# Patient Record
Sex: FEMALE | Race: WHITE | NOT HISPANIC OR LATINO
[De-identification: names, ages, dates, MRNs, and addresses within clinical notes are randomized per-mention and may not be internally consistent; named-entity substitution may affect disease eponyms.]

---

## 2019-04-11 DIAGNOSIS — Z82.3 FAMILY HISTORY OF STROKE: ICD-10-CM

## 2019-04-11 DIAGNOSIS — Z82.49 FAMILY HISTORY OF ISCHEMIC HEART DISEASE AND OTHER DISEASES OF THE CIRCULATORY SYSTEM: ICD-10-CM

## 2019-04-11 RX ORDER — ATORVASTATIN CALCIUM 10 MG/1
10 TABLET, FILM COATED ORAL
Qty: 30 | Refills: 0 | Status: COMPLETED | COMMUNITY

## 2019-04-12 ENCOUNTER — MEDICATION RENEWAL (OUTPATIENT)
Age: 54
End: 2019-04-12

## 2019-05-08 ENCOUNTER — LABORATORY RESULT (OUTPATIENT)
Age: 54
End: 2019-05-08

## 2019-05-08 ENCOUNTER — APPOINTMENT (OUTPATIENT)
Dept: CARDIOLOGY | Facility: CLINIC | Age: 54
End: 2019-05-08
Payer: COMMERCIAL

## 2019-05-08 VITALS
DIASTOLIC BLOOD PRESSURE: 82 MMHG | HEART RATE: 72 BPM | WEIGHT: 180 LBS | BODY MASS INDEX: 28.93 KG/M2 | HEIGHT: 66 IN | OXYGEN SATURATION: 97 % | SYSTOLIC BLOOD PRESSURE: 110 MMHG | TEMPERATURE: 98.9 F

## 2019-05-08 DIAGNOSIS — R10.9 UNSPECIFIED ABDOMINAL PAIN: ICD-10-CM

## 2019-05-08 DIAGNOSIS — Z87.891 PERSONAL HISTORY OF NICOTINE DEPENDENCE: ICD-10-CM

## 2019-05-08 PROCEDURE — 99204 OFFICE O/P NEW MOD 45 MIN: CPT

## 2019-05-08 PROCEDURE — 93306 TTE W/DOPPLER COMPLETE: CPT

## 2019-05-08 PROCEDURE — 93000 ELECTROCARDIOGRAM COMPLETE: CPT

## 2019-05-08 RX ORDER — DICLOFENAC SODIUM 100 MG/1
100 TABLET, FILM COATED, EXTENDED RELEASE ORAL
Qty: 7 | Refills: 0 | Status: DISCONTINUED | COMMUNITY
End: 2019-05-08

## 2019-05-08 RX ORDER — AMLODIPINE BESYLATE VALSARTAN HYDROCHLOROTHIAZIDE 10; 25; 320 MG/1; MG/1; MG/1
TABLET, FILM COATED ORAL DAILY
Refills: 0 | Status: DISCONTINUED | COMMUNITY
End: 2019-05-08

## 2019-05-10 DIAGNOSIS — R79.89 OTHER SPECIFIED ABNORMAL FINDINGS OF BLOOD CHEMISTRY: ICD-10-CM

## 2019-05-10 LAB
25(OH)D3 SERPL-MCNC: 46.2 NG/ML
ALBUMIN SERPL ELPH-MCNC: 4.6 G/DL
ALP BLD-CCNC: 80 U/L
ALT SERPL-CCNC: 25 U/L
ANION GAP SERPL CALC-SCNC: 12 MMOL/L
AST SERPL-CCNC: 19 U/L
BASOPHILS # BLD AUTO: 0.07 K/UL
BASOPHILS NFR BLD AUTO: 0.5 %
BILIRUB SERPL-MCNC: 0.5 MG/DL
BUN SERPL-MCNC: 18 MG/DL
CALCIUM SERPL-MCNC: 10.2 MG/DL
CHLORIDE SERPL-SCNC: 103 MMOL/L
CHOLEST SERPL-MCNC: 183 MG/DL
CHOLEST/HDLC SERPL: 2.7 RATIO
CO2 SERPL-SCNC: 27 MMOL/L
CREAT SERPL-MCNC: 0.74 MG/DL
EOSINOPHIL # BLD AUTO: 0.13 K/UL
EOSINOPHIL NFR BLD AUTO: 1 %
ESTIMATED AVERAGE GLUCOSE: 111 MG/DL
GLUCOSE SERPL-MCNC: 92 MG/DL
HBA1C MFR BLD HPLC: 5.5 %
HCT VFR BLD CALC: 46.8 %
HDLC SERPL-MCNC: 69 MG/DL
HGB BLD-MCNC: 15 G/DL
IMM GRANULOCYTES NFR BLD AUTO: 0.4 %
LDLC SERPL CALC-MCNC: 92 MG/DL
LYMPHOCYTES # BLD AUTO: 3.59 K/UL
LYMPHOCYTES NFR BLD AUTO: 26.5 %
MAGNESIUM SERPL-MCNC: 2.1 MG/DL
MAN DIFF?: NORMAL
MCHC RBC-ENTMCNC: 32.1 GM/DL
MCHC RBC-ENTMCNC: 33 PG
MCV RBC AUTO: 102.9 FL
MONOCYTES # BLD AUTO: 1 K/UL
MONOCYTES NFR BLD AUTO: 7.4 %
NEUTROPHILS # BLD AUTO: 8.73 K/UL
NEUTROPHILS NFR BLD AUTO: 64.2 %
PHOSPHATE SERPL-MCNC: 3.9 MG/DL
PLATELET # BLD AUTO: 476 K/UL
POTASSIUM SERPL-SCNC: 5.3 MMOL/L
PROT SERPL-MCNC: 7.5 G/DL
RBC # BLD: 4.55 M/UL
RBC # FLD: 12.8 %
SODIUM SERPL-SCNC: 142 MMOL/L
T3RU NFR SERPL: 1 TBI
T4 FREE SERPL-MCNC: 1.5 NG/DL
T4 SERPL-MCNC: 9.2 UG/DL
TRIGL SERPL-MCNC: 111 MG/DL
TSH SERPL-ACNC: 2.7 UIU/ML
URATE SERPL-MCNC: 3.1 MG/DL
WBC # FLD AUTO: 13.57 K/UL

## 2019-05-10 NOTE — PHYSICAL EXAM
[General Appearance - Well Developed] : well developed [Normal Appearance] : normal appearance [Well Groomed] : well groomed [General Appearance - Well Nourished] : well nourished [General Appearance - In No Acute Distress] : no acute distress [No Deformities] : no deformities [Eyelids - No Xanthelasma] : the eyelids demonstrated no xanthelasmas [Normal Conjunctiva] : the conjunctiva exhibited no abnormalities [No Oral Pallor] : no oral pallor [Normal Oral Mucosa] : normal oral mucosa [Normal Jugular Venous A Waves Present] : normal jugular venous A waves present [No Oral Cyanosis] : no oral cyanosis [No Jugular Venous Lemus A Waves] : no jugular venous lemus A waves [Normal Jugular Venous V Waves Present] : normal jugular venous V waves present [Murmurs] : no murmurs present [Heart Rate And Rhythm] : heart rate and rhythm were normal [Heart Sounds] : normal S1 and S2 [Exaggerated Use Of Accessory Muscles For Inspiration] : no accessory muscle use [Respiration, Rhythm And Depth] : normal respiratory rhythm and effort [Auscultation Breath Sounds / Voice Sounds] : lungs were clear to auscultation bilaterally [Abdomen Tenderness] : non-tender [Abdomen Soft] : soft [Abnormal Walk] : normal gait [Abdomen Mass (___ Cm)] : no abdominal mass palpated [Gait - Sufficient For Exercise Testing] : the gait was sufficient for exercise testing [Nail Clubbing] : no clubbing of the fingernails [Cyanosis, Localized] : no localized cyanosis [Petechial Hemorrhages (___cm)] : no petechial hemorrhages [] : no ischemic changes [Skin Color & Pigmentation] : normal skin color and pigmentation [No Venous Stasis] : no venous stasis [Skin Lesions] : no skin lesions [No Skin Ulcers] : no skin ulcer [No Xanthoma] : no  xanthoma was observed [Oriented To Time, Place, And Person] : oriented to person, place, and time [Affect] : the affect was normal [Mood] : the mood was normal [FreeTextEntry1] : admits to anxiety

## 2019-05-10 NOTE — REASON FOR VISIT
[FreeTextEntry1] : 54 year old female Latonia Guzman presents today for a cardiac evaluation and follow up. States that she feels like whenever she swallows she feels like something is stuck in her throat. he patient states that about a month agoshe had noticed that she may have had some blood in her stool. She states that this did resolve and shehasn't had any episodes since that time.Patient states that she doesn’t have any chest pain, SOB, or lightheadedness. But that she feels like she cannot take a full breath. States she does not know if this is from anxiety. Wants to get blood work and her medications refilled. The patient presents for evaluation of high blood pressure. Patient is currently tolerating the current  antihypertensive regime and they deny headaches, stiff neck, visual changes, pedal Edema or PND. They also are here for follow-up of elevated cholesterol. Patient is currently tolerating medication and denies muscle pain, joint pain, back pain, tea colored urine ,nausea, vomiting, abdominal pain or diarrhea. The patient is trying to follow a low cholesterol diet.\par

## 2019-05-30 ENCOUNTER — APPOINTMENT (OUTPATIENT)
Dept: GASTROENTEROLOGY | Facility: CLINIC | Age: 54
End: 2019-05-30
Payer: COMMERCIAL

## 2019-05-30 VITALS
SYSTOLIC BLOOD PRESSURE: 125 MMHG | HEIGHT: 66 IN | BODY MASS INDEX: 28.93 KG/M2 | TEMPERATURE: 98.6 F | OXYGEN SATURATION: 98 % | HEART RATE: 75 BPM | WEIGHT: 180 LBS | DIASTOLIC BLOOD PRESSURE: 80 MMHG

## 2019-05-30 DIAGNOSIS — K92.1 MELENA: ICD-10-CM

## 2019-05-30 PROCEDURE — 99244 OFF/OP CNSLTJ NEW/EST MOD 40: CPT

## 2019-05-30 NOTE — PHYSICAL EXAM
[General Appearance - Alert] : alert [General Appearance - In No Acute Distress] : in no acute distress [Sclera] : the sclera and conjunctiva were normal [PERRL With Normal Accommodation] : pupils were equal in size, round, and reactive to light [Extraocular Movements] : extraocular movements were intact [Outer Ear] : the ears and nose were normal in appearance [Oropharynx] : the oropharynx was normal [Neck Appearance] : the appearance of the neck was normal [Neck Cervical Mass (___cm)] : no neck mass was observed [Jugular Venous Distention Increased] : there was no jugular-venous distention [Thyroid Diffuse Enlargement] : the thyroid was not enlarged [Thyroid Nodule] : there were no palpable thyroid nodules [] : no respiratory distress [Auscultation Breath Sounds / Voice Sounds] : lungs were clear to auscultation bilaterally [Heart Rate And Rhythm] : heart rate was normal and rhythm regular [Heart Sounds] : normal S1 and S2 [Heart Sounds Gallop] : no gallops [Murmurs] : no murmurs [Heart Sounds Pericardial Friction Rub] : no pericardial rub [Normal] : normal [Soft, Nontender] : the abdomen was soft and nontender [Epigastric] : in the epigastric area [No Mass] : no masses were palpated [No HSM] : no hepatosplenomegaly noted [Cervical Lymph Nodes Enlarged Posterior Bilaterally] : posterior cervical [Cervical Lymph Nodes Enlarged Anterior Bilaterally] : anterior cervical [Supraclavicular Lymph Nodes Enlarged Bilaterally] : supraclavicular [Axillary Lymph Nodes Enlarged Bilaterally] : axillary [Femoral Lymph Nodes Enlarged Bilaterally] : femoral [Inguinal Lymph Nodes Enlarged Bilaterally] : inguinal [No CVA Tenderness] : no ~M costovertebral angle tenderness [No Spinal Tenderness] : no spinal tenderness [Abnormal Walk] : normal gait [Nail Clubbing] : no clubbing  or cyanosis of the fingernails [Musculoskeletal - Swelling] : no joint swelling seen [Motor Tone] : muscle strength and tone were normal [Deep Tendon Reflexes (DTR)] : deep tendon reflexes were 2+ and symmetric [Sensation] : the sensory exam was normal to light touch and pinprick [No Focal Deficits] : no focal deficits [Oriented To Time, Place, And Person] : oriented to person, place, and time [Impaired Insight] : insight and judgment were intact [Affect] : the affect was normal

## 2019-05-30 NOTE — HISTORY OF PRESENT ILLNESS
[de-identified] : Last colon 5 years ago \par \par GERD on NSAIDS \par \par For hemorrhoidctomy\par \par I discussed the risks benefits and alternatives of Hemorrhoid Electrical Treatment at length. we discussed the procedure and the recovery period. We also discussed that periodically additional / surgical hemorrhoid treatment may become necessary.\par \par  A low FODMAP diet was discussed with the patient at length. The patient had multiple questions all of which were answered. I recommended a nutritionist. Also recommended that the patient keep a food diary. We discussed  options such as Vegetables. Fresh fruits. Dairy that is lactose-free, and hard cheeses, or ripened/matured cheeses including... Beef, pork, chicken, fish, eggs. Avoid breadcrumbs, marinades, and sauces/gravies that may be high in FODMAPs. Soy products including tofu, tempeh. Grains.\par \par A low acid / reflux diet was discussed in great detail including  not smoking, not drinking alcohol, and not consuming foods that irritate the esophagus. It is helpful to eat small meals throughout the day instead of large meals. You should avoid eating before bedtime or lying down after you eat. It can be helpful to raise the head of your bed six inches. Additionally, you should maintain a healthy weight and good posture.. The patient was given written material to take home and review.\par

## 2019-07-09 ENCOUNTER — APPOINTMENT (OUTPATIENT)
Dept: GASTROENTEROLOGY | Facility: HOSPITAL | Age: 54
End: 2019-07-09

## 2019-07-23 ENCOUNTER — APPOINTMENT (OUTPATIENT)
Dept: CARDIOLOGY | Facility: CLINIC | Age: 54
End: 2019-07-23

## 2019-08-06 ENCOUNTER — RX RENEWAL (OUTPATIENT)
Age: 54
End: 2019-08-06

## 2019-09-10 ENCOUNTER — MEDICATION RENEWAL (OUTPATIENT)
Age: 54
End: 2019-09-10

## 2019-10-02 ENCOUNTER — APPOINTMENT (OUTPATIENT)
Dept: CARDIOLOGY | Facility: CLINIC | Age: 54
End: 2019-10-02
Payer: COMMERCIAL

## 2019-10-02 ENCOUNTER — NON-APPOINTMENT (OUTPATIENT)
Age: 54
End: 2019-10-02

## 2019-10-02 VITALS
OXYGEN SATURATION: 99 % | BODY MASS INDEX: 30.53 KG/M2 | WEIGHT: 190 LBS | DIASTOLIC BLOOD PRESSURE: 95 MMHG | HEART RATE: 92 BPM | SYSTOLIC BLOOD PRESSURE: 115 MMHG | TEMPERATURE: 98.6 F | HEIGHT: 66 IN

## 2019-10-02 PROCEDURE — 99214 OFFICE O/P EST MOD 30 MIN: CPT

## 2019-10-02 PROCEDURE — 93000 ELECTROCARDIOGRAM COMPLETE: CPT

## 2019-10-03 LAB
ALBUMIN SERPL ELPH-MCNC: 4.8 G/DL
ALP BLD-CCNC: 83 U/L
ALT SERPL-CCNC: 28 U/L
ANION GAP SERPL CALC-SCNC: 13 MMOL/L
AST SERPL-CCNC: 26 U/L
BILIRUB DIRECT SERPL-MCNC: 0.1 MG/DL
BILIRUB INDIRECT SERPL-MCNC: 0.4 MG/DL
BILIRUB SERPL-MCNC: 0.6 MG/DL
BUN SERPL-MCNC: 14 MG/DL
CALCIUM SERPL-MCNC: 10.1 MG/DL
CHLORIDE SERPL-SCNC: 103 MMOL/L
CHOLEST SERPL-MCNC: 183 MG/DL
CHOLEST/HDLC SERPL: 3.2 RATIO
CK SERPL-CCNC: 59 U/L
CO2 SERPL-SCNC: 25 MMOL/L
CREAT SERPL-MCNC: 0.71 MG/DL
ESTIMATED AVERAGE GLUCOSE: 114 MG/DL
GLUCOSE SERPL-MCNC: 90 MG/DL
HBA1C MFR BLD HPLC: 5.6 %
HDLC SERPL-MCNC: 58 MG/DL
LDLC SERPL CALC-MCNC: 99 MG/DL
LDLC SERPL DIRECT ASSAY-MCNC: 115 MG/DL
POTASSIUM SERPL-SCNC: 4.5 MMOL/L
PROT SERPL-MCNC: 7.1 G/DL
SODIUM SERPL-SCNC: 141 MMOL/L
TRIGL SERPL-MCNC: 130 MG/DL

## 2019-10-07 ENCOUNTER — OUTPATIENT (OUTPATIENT)
Dept: OUTPATIENT SERVICES | Facility: HOSPITAL | Age: 54
LOS: 1 days | End: 2019-10-07
Payer: COMMERCIAL

## 2019-10-07 VITALS
WEIGHT: 188.27 LBS | HEART RATE: 78 BPM | DIASTOLIC BLOOD PRESSURE: 87 MMHG | TEMPERATURE: 99 F | SYSTOLIC BLOOD PRESSURE: 128 MMHG | RESPIRATION RATE: 16 BRPM | HEIGHT: 65.5 IN | OXYGEN SATURATION: 98 %

## 2019-10-07 DIAGNOSIS — M20.5X1 OTHER DEFORMITIES OF TOE(S) (ACQUIRED), RIGHT FOOT: ICD-10-CM

## 2019-10-07 DIAGNOSIS — Z98.51 TUBAL LIGATION STATUS: Chronic | ICD-10-CM

## 2019-10-07 DIAGNOSIS — M20.41 OTHER HAMMER TOE(S) (ACQUIRED), RIGHT FOOT: ICD-10-CM

## 2019-10-07 DIAGNOSIS — Z98.82 BREAST IMPLANT STATUS: Chronic | ICD-10-CM

## 2019-10-07 DIAGNOSIS — Z01.818 ENCOUNTER FOR OTHER PREPROCEDURAL EXAMINATION: ICD-10-CM

## 2019-10-07 DIAGNOSIS — M24.574 CONTRACTURE, RIGHT FOOT: ICD-10-CM

## 2019-10-07 DIAGNOSIS — M20.21 HALLUX RIGIDUS, RIGHT FOOT: ICD-10-CM

## 2019-10-07 DIAGNOSIS — Z98.890 OTHER SPECIFIED POSTPROCEDURAL STATES: Chronic | ICD-10-CM

## 2019-10-07 LAB
HCT VFR BLD CALC: 42.2 % — SIGNIFICANT CHANGE UP (ref 34.5–45)
HGB BLD-MCNC: 14.2 G/DL — SIGNIFICANT CHANGE UP (ref 11.5–15.5)
MCHC RBC-ENTMCNC: 32.5 PG — SIGNIFICANT CHANGE UP (ref 27–34)
MCHC RBC-ENTMCNC: 33.6 GM/DL — SIGNIFICANT CHANGE UP (ref 32–36)
MCV RBC AUTO: 96.6 FL — SIGNIFICANT CHANGE UP (ref 80–100)
NRBC # BLD: 0 /100 WBCS — SIGNIFICANT CHANGE UP (ref 0–0)
PLATELET # BLD AUTO: 360 K/UL — SIGNIFICANT CHANGE UP (ref 150–400)
RBC # BLD: 4.37 M/UL — SIGNIFICANT CHANGE UP (ref 3.8–5.2)
RBC # FLD: 11.9 % — SIGNIFICANT CHANGE UP (ref 10.3–14.5)
WBC # BLD: 8.5 K/UL — SIGNIFICANT CHANGE UP (ref 3.8–10.5)
WBC # FLD AUTO: 8.5 K/UL — SIGNIFICANT CHANGE UP (ref 3.8–10.5)

## 2019-10-07 PROCEDURE — G0463: CPT

## 2019-10-07 PROCEDURE — 85027 COMPLETE CBC AUTOMATED: CPT

## 2019-10-07 PROCEDURE — 36415 COLL VENOUS BLD VENIPUNCTURE: CPT

## 2019-10-07 NOTE — H&P PST ADULT - ATTENDING COMMENTS
Physician Assistant Statement   I have reviewed the chart and labs.  There have been no changes in the patients medical or physical hx since her PST's and   medical clearance.    Rhoda faith
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
## 2019-10-07 NOTE — H&P PST ADULT - RS GEN PE MLT RESP DETAILS PC
no chest wall tenderness/good air movement/normal/breath sounds equal/respirations non-labored/no intercostal retractions/clear to auscultation bilaterally/airway patent

## 2019-10-07 NOTE — H&P PST ADULT - NSANTHOSAYNRD_GEN_A_CORE
No. SHARYN screening performed.  STOP BANG Legend: 0-2 = LOW Risk; 3-4 = INTERMEDIATE Risk; 5-8 = HIGH Risk

## 2019-10-07 NOTE — H&P PST ADULT - NSICDXPASTSURGICALHX_GEN_ALL_CORE_FT
PAST SURGICAL HISTORY:  H/O breast implant     H/O lumpectomy     H/O tubal ligation      S/P surgical removal of pilonidal cyst 1990

## 2019-10-07 NOTE — H&P PST ADULT - NSICDXPROBLEM_GEN_ALL_CORE_FT
PROBLEM DIAGNOSES  Problem: Other deformities of toe(s) (acquired), right foot  Assessment and Plan: Patient schedule for right foot surgery. Labs and medical clearance pending.

## 2019-10-17 ENCOUNTER — TRANSCRIPTION ENCOUNTER (OUTPATIENT)
Age: 54
End: 2019-10-17

## 2019-10-18 ENCOUNTER — RESULT REVIEW (OUTPATIENT)
Age: 54
End: 2019-10-18

## 2019-10-18 ENCOUNTER — OUTPATIENT (OUTPATIENT)
Dept: OUTPATIENT SERVICES | Facility: HOSPITAL | Age: 54
LOS: 1 days | End: 2019-10-18
Payer: COMMERCIAL

## 2019-10-18 VITALS
TEMPERATURE: 98 F | HEART RATE: 88 BPM | OXYGEN SATURATION: 96 % | WEIGHT: 188.27 LBS | SYSTOLIC BLOOD PRESSURE: 112 MMHG | HEIGHT: 65.5 IN | DIASTOLIC BLOOD PRESSURE: 79 MMHG | RESPIRATION RATE: 17 BRPM

## 2019-10-18 VITALS
RESPIRATION RATE: 16 BRPM | OXYGEN SATURATION: 98 % | HEART RATE: 70 BPM | SYSTOLIC BLOOD PRESSURE: 112 MMHG | DIASTOLIC BLOOD PRESSURE: 76 MMHG | TEMPERATURE: 98 F

## 2019-10-18 DIAGNOSIS — M20.21 HALLUX RIGIDUS, RIGHT FOOT: ICD-10-CM

## 2019-10-18 DIAGNOSIS — Z98.82 BREAST IMPLANT STATUS: Chronic | ICD-10-CM

## 2019-10-18 DIAGNOSIS — Z98.890 OTHER SPECIFIED POSTPROCEDURAL STATES: Chronic | ICD-10-CM

## 2019-10-18 DIAGNOSIS — M20.41 OTHER HAMMER TOE(S) (ACQUIRED), RIGHT FOOT: ICD-10-CM

## 2019-10-18 DIAGNOSIS — Z98.51 TUBAL LIGATION STATUS: Chronic | ICD-10-CM

## 2019-10-18 DIAGNOSIS — M24.574 CONTRACTURE, RIGHT FOOT: ICD-10-CM

## 2019-10-18 DIAGNOSIS — M20.5X1 OTHER DEFORMITIES OF TOE(S) (ACQUIRED), RIGHT FOOT: ICD-10-CM

## 2019-10-18 PROCEDURE — 88311 DECALCIFY TISSUE: CPT

## 2019-10-18 PROCEDURE — 28272 RELEASE OF TOE JOINT EACH: CPT | Mod: T9

## 2019-10-18 PROCEDURE — 88311 DECALCIFY TISSUE: CPT | Mod: 26

## 2019-10-18 PROCEDURE — 73620 X-RAY EXAM OF FOOT: CPT

## 2019-10-18 PROCEDURE — 88304 TISSUE EXAM BY PATHOLOGIST: CPT | Mod: 26

## 2019-10-18 PROCEDURE — 73620 X-RAY EXAM OF FOOT: CPT | Mod: 26,RT

## 2019-10-18 PROCEDURE — 28292 COR HLX VLGS RSC PRX PHLX BS: CPT | Mod: RT

## 2019-10-18 PROCEDURE — 28270 RELEASE OF FOOT CONTRACTURE: CPT | Mod: T6,XS

## 2019-10-18 PROCEDURE — 88304 TISSUE EXAM BY PATHOLOGIST: CPT

## 2019-10-18 RX ORDER — HYDROMORPHONE HYDROCHLORIDE 2 MG/ML
0.5 INJECTION INTRAMUSCULAR; INTRAVENOUS; SUBCUTANEOUS
Refills: 0 | Status: DISCONTINUED | OUTPATIENT
Start: 2019-10-18 | End: 2019-10-18

## 2019-10-18 RX ORDER — GABAPENTIN 400 MG/1
1 CAPSULE ORAL
Qty: 0 | Refills: 0 | DISCHARGE
Start: 2019-10-18

## 2019-10-18 RX ORDER — SODIUM CHLORIDE 9 MG/ML
1000 INJECTION, SOLUTION INTRAVENOUS
Refills: 0 | Status: DISCONTINUED | OUTPATIENT
Start: 2019-10-18 | End: 2019-10-18

## 2019-10-18 RX ORDER — DUPILUMAB 300 MG/2ML
1 INJECTION, SOLUTION SUBCUTANEOUS
Qty: 0 | Refills: 0 | DISCHARGE
Start: 2019-10-18

## 2019-10-18 RX ORDER — PANTOPRAZOLE SODIUM 20 MG/1
1 TABLET, DELAYED RELEASE ORAL
Qty: 0 | Refills: 0 | DISCHARGE
Start: 2019-10-18

## 2019-10-18 RX ORDER — GABAPENTIN 400 MG/1
1 CAPSULE ORAL
Qty: 0 | Refills: 0 | DISCHARGE

## 2019-10-18 RX ORDER — PANTOPRAZOLE SODIUM 20 MG/1
1 TABLET, DELAYED RELEASE ORAL
Qty: 0 | Refills: 0 | DISCHARGE

## 2019-10-18 RX ORDER — DUPILUMAB 300 MG/2ML
0 INJECTION, SOLUTION SUBCUTANEOUS
Qty: 0 | Refills: 0 | DISCHARGE

## 2019-10-18 RX ORDER — ONDANSETRON 8 MG/1
4 TABLET, FILM COATED ORAL ONCE
Refills: 0 | Status: DISCONTINUED | OUTPATIENT
Start: 2019-10-18 | End: 2019-10-18

## 2019-10-18 RX ORDER — LEVOTHYROXINE SODIUM 125 MCG
1 TABLET ORAL
Qty: 0 | Refills: 0 | DISCHARGE

## 2019-10-18 RX ORDER — AMLODIPINE AND VALSARTAN 5; 320 MG/1; MG/1
1 TABLET, FILM COATED ORAL
Qty: 0 | Refills: 0 | DISCHARGE

## 2019-10-18 RX ORDER — ACETAMINOPHEN 500 MG
2 TABLET ORAL
Qty: 0 | Refills: 0 | DISCHARGE

## 2019-10-18 RX ORDER — ATORVASTATIN CALCIUM 80 MG/1
1 TABLET, FILM COATED ORAL
Qty: 0 | Refills: 0 | DISCHARGE

## 2019-10-18 RX ORDER — VORTIOXETINE 5 MG/1
1 TABLET, FILM COATED ORAL
Qty: 0 | Refills: 0 | DISCHARGE

## 2019-10-18 RX ORDER — AMLODIPINE AND VALSARTAN 5; 320 MG/1; MG/1
1 TABLET, FILM COATED ORAL
Qty: 0 | Refills: 0 | DISCHARGE
Start: 2019-10-18

## 2019-10-18 RX ORDER — VORTIOXETINE 5 MG/1
1 TABLET, FILM COATED ORAL
Qty: 0 | Refills: 0 | DISCHARGE
Start: 2019-10-18

## 2019-10-18 RX ADMIN — SODIUM CHLORIDE 50 MILLILITER(S): 9 INJECTION, SOLUTION INTRAVENOUS at 09:28

## 2019-10-18 NOTE — ASU PATIENT PROFILE, ADULT - PSH
H/O breast implant    H/O lumpectomy    H/O tubal ligation     S/P surgical removal of pilonidal cyst  1990

## 2019-10-18 NOTE — BRIEF OPERATIVE NOTE - NSICDXBRIEFPROCEDURE_GEN_ALL_CORE_FT
PROCEDURES:  Open flexor tenotomy of right foot 18-Oct-2019 11:43:48  Samantha Osuna  Daigle bunionectomy 18-Oct-2019 11:43:31  Samantha Osuna

## 2019-10-18 NOTE — BRIEF OPERATIVE NOTE - NSICDXBRIEFPOSTOP_GEN_ALL_CORE_FT
POST-OP DIAGNOSIS:  Hammertoe of right foot 18-Oct-2019 11:47:59  Samantha Osuna  Hallux limitus, right 18-Oct-2019 11:47:50  Samantha Osuna

## 2019-10-18 NOTE — BRIEF OPERATIVE NOTE - NSICDXBRIEFPREOP_GEN_ALL_CORE_FT
PRE-OP DIAGNOSIS:  Hallux limitus, right 18-Oct-2019 11:47:35  Samantha Osuna  Acquired hammertoe of right foot 18-Oct-2019 11:44:11  Samantha Osuna  Hallux limitus, right 18-Oct-2019 11:44:00  Smaantha Osuna

## 2019-10-18 NOTE — ASU PATIENT PROFILE, ADULT - PMH
Anxiety and depression    Benign tumor of breast, left    Eczema    Foot pain, right    GERD (gastroesophageal reflux disease)    H/O connective tissue disease    Hypercholesterolemia    Hypertension    Hypothyroidism

## 2019-10-18 NOTE — ASU DISCHARGE PLAN (ADULT/PEDIATRIC) - PAIN MANAGEMENT
Prescription given to patient/guardian/Prescriptions electronically submitted to pharmacy from doctor's office

## 2019-10-22 LAB — SURGICAL PATHOLOGY STUDY: SIGNIFICANT CHANGE UP

## 2019-11-06 ENCOUNTER — MEDICATION RENEWAL (OUTPATIENT)
Age: 54
End: 2019-11-06

## 2019-11-06 RX ORDER — AMLODIPINE BESYLATE 5 MG/1
5 TABLET ORAL DAILY
Qty: 90 | Refills: 1 | Status: DISCONTINUED | COMMUNITY
End: 2019-11-06

## 2020-06-25 PROBLEM — E78.00 PURE HYPERCHOLESTEROLEMIA, UNSPECIFIED: Chronic | Status: ACTIVE | Noted: 2019-10-07

## 2020-06-25 PROBLEM — K21.9 GASTRO-ESOPHAGEAL REFLUX DISEASE WITHOUT ESOPHAGITIS: Chronic | Status: ACTIVE | Noted: 2019-10-07

## 2020-06-25 PROBLEM — E03.9 HYPOTHYROIDISM, UNSPECIFIED: Chronic | Status: ACTIVE | Noted: 2019-10-07

## 2020-06-25 PROBLEM — Z87.39 PERSONAL HISTORY OF OTHER DISEASES OF THE MUSCULOSKELETAL SYSTEM AND CONNECTIVE TISSUE: Chronic | Status: ACTIVE | Noted: 2019-10-07

## 2020-06-25 PROBLEM — I10 ESSENTIAL (PRIMARY) HYPERTENSION: Chronic | Status: ACTIVE | Noted: 2019-10-07

## 2020-06-25 PROBLEM — F41.9 ANXIETY DISORDER, UNSPECIFIED: Chronic | Status: ACTIVE | Noted: 2019-10-07

## 2020-07-14 ENCOUNTER — NON-APPOINTMENT (OUTPATIENT)
Age: 55
End: 2020-07-14

## 2020-07-14 ENCOUNTER — APPOINTMENT (OUTPATIENT)
Dept: CARDIOLOGY | Facility: CLINIC | Age: 55
End: 2020-07-14
Payer: COMMERCIAL

## 2020-07-14 VITALS
BODY MASS INDEX: 30.53 KG/M2 | WEIGHT: 190 LBS | OXYGEN SATURATION: 96 % | TEMPERATURE: 96.6 F | DIASTOLIC BLOOD PRESSURE: 84 MMHG | SYSTOLIC BLOOD PRESSURE: 120 MMHG | HEIGHT: 66 IN | HEART RATE: 100 BPM

## 2020-07-14 PROCEDURE — 99214 OFFICE O/P EST MOD 30 MIN: CPT

## 2020-07-14 PROCEDURE — 93000 ELECTROCARDIOGRAM COMPLETE: CPT

## 2020-07-15 ENCOUNTER — APPOINTMENT (OUTPATIENT)
Dept: CARDIOLOGY | Facility: CLINIC | Age: 55
End: 2020-07-15
Payer: COMMERCIAL

## 2020-07-15 PROCEDURE — 78452 HT MUSCLE IMAGE SPECT MULT: CPT

## 2020-07-15 PROCEDURE — A9500: CPT

## 2020-07-15 PROCEDURE — 93015 CV STRESS TEST SUPVJ I&R: CPT

## 2020-07-15 NOTE — PHYSICAL EXAM
[General Appearance - Well Developed] : well developed [Normal Appearance] : normal appearance [General Appearance - Well Nourished] : well nourished [Well Groomed] : well groomed [No Deformities] : no deformities [General Appearance - In No Acute Distress] : no acute distress [Normal Conjunctiva] : the conjunctiva exhibited no abnormalities [Eyelids - No Xanthelasma] : the eyelids demonstrated no xanthelasmas [Normal Oral Mucosa] : normal oral mucosa [No Oral Pallor] : no oral pallor [Normal Jugular Venous V Waves Present] : normal jugular venous V waves present [No Oral Cyanosis] : no oral cyanosis [Normal Jugular Venous A Waves Present] : normal jugular venous A waves present [No Jugular Venous Lemus A Waves] : no jugular venous lemus A waves [Auscultation Breath Sounds / Voice Sounds] : lungs were clear to auscultation bilaterally [Respiration, Rhythm And Depth] : normal respiratory rhythm and effort [Exaggerated Use Of Accessory Muscles For Inspiration] : no accessory muscle use [Abdomen Tenderness] : non-tender [Abdomen Soft] : soft [Abdomen Mass (___ Cm)] : no abdominal mass palpated [Abnormal Walk] : normal gait [Gait - Sufficient For Exercise Testing] : the gait was sufficient for exercise testing [Nail Clubbing] : no clubbing of the fingernails [Petechial Hemorrhages (___cm)] : no petechial hemorrhages [Cyanosis, Localized] : no localized cyanosis [Skin Color & Pigmentation] : normal skin color and pigmentation [] : no rash [No Venous Stasis] : no venous stasis [No Skin Ulcers] : no skin ulcer [Skin Lesions] : no skin lesions [No Xanthoma] : no  xanthoma was observed [Oriented To Time, Place, And Person] : oriented to person, place, and time [Mood] : the mood was normal [Affect] : the affect was normal [No Anxiety] : not feeling anxious [FreeTextEntry1] : Regular rate and rhythm, NL S1, S2, non-displaced PMI, chest non-tender; no rubs,heaves  or gallops a  Grade 2/6 systolic murmur noted at the LSB

## 2020-07-15 NOTE — HISTORY OF PRESENT ILLNESS
[FreeTextEntry1] : I was asked to see this delightful patient today for Pre-op Evaluation  prior to replacement of ruptured breast impant with   Dr. Mcgowan at fax number  [phone] and   [phone] .  The patient denies fever, cough, wheezing, sputum production, hemoptysis, dyspnea, congestion, diarrhea, constipation, nausea, vomiting, bright red blood per rectum, melena, angina, chest pain, palpitations, diaphoresis, PND, incontinence, frequency, urgency, dysuria, edema, joint pain, headache, weakness, numbness and dizziness. The date of the planned procedure is: 7/17/2020.\par  The patient presents for evaluation of high blood pressure. Patient is currently tolerating the current  antihypertensive regime and they deny headaches, stiff neck, visual changes, pedal Edema or PND. They also are here for follow-up of elevated cholesterol. Patient is currently tolerating medication and and does not complain of new  muscle pain, joint pain, back pain,urinary changes ,nausea, vomiting, abdominal pain or diarrhea. The patient is trying to follow a low cholesterol diet. \par The patient is here for follow-up of hypothyroidism. The patient states that they are taking their medicine regularly that they have no new complaints. They deny any  heat or cold intolerance hair loss and fatigue.

## 2020-07-16 ENCOUNTER — APPOINTMENT (OUTPATIENT)
Dept: CARDIOLOGY | Facility: CLINIC | Age: 55
End: 2020-07-16
Payer: COMMERCIAL

## 2020-07-16 PROCEDURE — 93306 TTE W/DOPPLER COMPLETE: CPT

## 2020-07-17 LAB
ABO + RH PNL BLD: NORMAL
ALBUMIN SERPL ELPH-MCNC: 4.6 G/DL
ALP BLD-CCNC: 82 U/L
ALT SERPL-CCNC: 25 U/L
ANION GAP SERPL CALC-SCNC: 11 MMOL/L
ANION GAP SERPL CALC-SCNC: 12 MMOL/L
AST SERPL-CCNC: 23 U/L
BILIRUB DIRECT SERPL-MCNC: 0.1 MG/DL
BILIRUB INDIRECT SERPL-MCNC: 0.4 MG/DL
BILIRUB SERPL-MCNC: 0.5 MG/DL
BUN SERPL-MCNC: 12 MG/DL
BUN SERPL-MCNC: 12 MG/DL
CALCIUM SERPL-MCNC: 9.6 MG/DL
CALCIUM SERPL-MCNC: 9.9 MG/DL
CHLORIDE SERPL-SCNC: 105 MMOL/L
CHLORIDE SERPL-SCNC: 105 MMOL/L
CHOLEST SERPL-MCNC: 176 MG/DL
CHOLEST/HDLC SERPL: 3 RATIO
CK SERPL-CCNC: 62 U/L
CO2 SERPL-SCNC: 26 MMOL/L
CO2 SERPL-SCNC: 26 MMOL/L
CREAT SERPL-MCNC: 0.68 MG/DL
CREAT SERPL-MCNC: 0.69 MG/DL
ESTIMATED AVERAGE GLUCOSE: 114 MG/DL
GLUCOSE SERPL-MCNC: 83 MG/DL
GLUCOSE SERPL-MCNC: 84 MG/DL
HBA1C MFR BLD HPLC: 5.6 %
HDLC SERPL-MCNC: 59 MG/DL
LDLC SERPL CALC-MCNC: 98 MG/DL
LDLC SERPL DIRECT ASSAY-MCNC: 102 MG/DL
POTASSIUM SERPL-SCNC: 4.3 MMOL/L
POTASSIUM SERPL-SCNC: 5 MMOL/L
PROT SERPL-MCNC: 6.9 G/DL
SARS-COV-2 IGG SERPL IA-ACNC: <0.1 INDEX
SARS-COV-2 IGG SERPL QL IA: NEGATIVE
SODIUM SERPL-SCNC: 143 MMOL/L
SODIUM SERPL-SCNC: 143 MMOL/L
TRIGL SERPL-MCNC: 92 MG/DL

## 2020-09-10 ENCOUNTER — APPOINTMENT (OUTPATIENT)
Dept: ORTHOPEDIC SURGERY | Facility: CLINIC | Age: 55
End: 2020-09-10
Payer: COMMERCIAL

## 2020-09-10 VITALS — HEIGHT: 66 IN | WEIGHT: 200 LBS | BODY MASS INDEX: 32.14 KG/M2

## 2020-09-10 PROCEDURE — 20610 DRAIN/INJ JOINT/BURSA W/O US: CPT | Mod: RT

## 2020-09-10 PROCEDURE — 73562 X-RAY EXAM OF KNEE 3: CPT | Mod: RT

## 2020-09-10 PROCEDURE — 99204 OFFICE O/P NEW MOD 45 MIN: CPT | Mod: 25

## 2020-09-11 NOTE — PHYSICAL EXAM
[de-identified] : Constitutional\par o Appearance : well-nourished, well developed, alert, in no acute distress \par Head and Face\par o Head :\par ¦ Inspection : atraumatic, normocephalic\par o Face :\par ¦ Inspection : no visible rash or discoloration\par Respiratory\par o Respiratory Effort: breathing unlabored \par Neurologic\par o Mental Status Examination :\par ¦ Orientation : grossly oriented to person, place and time\par Psychiatric\par o Mood and Affect: mood normal, affect appropriate \par \par Right Lower Extremity\par o Buttock : no tenderness, swelling or deformities \par o Right Hip :\par ¦ Inspection/Palpation : no tenderness, swelling or deformities\par ¦ Range of Motion : full and painless in all planes, no crepitance\par ¦ Stability : joint stability intact\par ¦ Strength : extension, flexion, adduction, abduction, internal rotation and external rotation, 4-/5 \par \par o Right Knee :\par ¦ Inspection/Palpation : medial and patellofemoral compartment tenderness to palpation, mild warmth, mild swelling, varus deformity\par ¦ Range of Motion : 0-110°, pain with full extension and flexion past 110°, patellofemoral crepitance\par ¦ Stability : no valgus or varus instability present on provocative testing\par ¦ Strength : flexion and extension 4-/5\par ¦ Tests and Signs : Anterior Drawer negative, Lachman negative, Evans's negative\par \par Left Lower Extremity\par o Buttock : no tenderness, swelling or deformities \par o Left Hip :\par ¦ Inspection/Palpation : no tenderness, no swelling or deformities\par ¦ Range of Motion : full and painless in all planes, no crepitance\par ¦ Stability : joint stability intact\par ¦ Strength : extension, flexion, adduction, abduction, internal rotation and external rotation, 4-/5\par \par o Left Knee :\par ¦ Inspection/Palpation : medial compartment tenderness to palpation, no lateral compartment tenderness, no swelling\par ¦ Range of Motion : active flexion and extension full and painless, mild patellofemoral crepitance, good patellofemoral glide\par ¦ Stability : no valgus or varus instability present on provocative testing\par ¦ Strength : flexion and extension 4-/5\par ¦ Tests and Signs : Anterior Drawer negative, Lachman negative, Evans's negative\par \par Gait: antalgic, pain with every step, varus deformity on the right, good alignment on the left, no significant extremity swelling or lymphedema, good proprioception and balance\par \par Radiology Results:\par o Right Knee: Standing AP, lateral and tunnel views were obtained and reveal moderate to severe medial and patellofemoral arthritis.\par o Left Knee: Standing AP, lateral and tunnel views were obtained and reveal moderate medial and severe patellofemoral arthritis.\par  \par Right knee injection: Under sterile preparation the patient was given 5 cc of lidocaine half cc of Kenalog and Decadron into the right knee joint.  She tolerated the procedure well.

## 2020-09-11 NOTE — ADDENDUM
[FreeTextEntry1] : I, Bhanu Harrison, acted solely as a scribe for Dr. Sergio Ruffin on this date 09/10/2020.\par All medical record entries made by the Scribe were at my, Dr. Sergio Ruffin, direction and personally dictated by me on 09/10/2020. I have reviewed the chart and agree that the record accurately reflects my personal performance of the history, physical exam, assessment and plan. I have also personally directed, reviewed, and agreed with the chart.

## 2020-09-11 NOTE — DISCUSSION/SUMMARY
[de-identified] : I discussed the underlying pathophysiology of the patient's condition in great detail with the patient. I went over the patient's x-rays with them in great detail. The extent of the patient’s arthritis was discussed in great detail with them. We discussed the use of ice, Tylenol and anti-inflammatories to relieve pain. The patient was instructed in ROM exercises they are to do at home. At-home strengthening, and stretching exercises were discussed and demonstrated with the patient. We went over the wide ranging benefits of exercise for the patient health and I encouraged her to begin a diligent exercise program. She should focus on light weight and high repetition exercises. She needs to avoid high-impact activities such as running and jumping. I recommend alternative activities such as riding a stationary bike on low tension, or the elliptical. She should avoid squatting and kneeling. I stressed the importance of weight loss and its benefits to the patient’s joints and overall health. At this time, she will start a course of physical therapy for strengthening and flexibility. A prescription for physical therapy was provided. Viscosupplementation was discussed as a solution to the patient's symptoms, and a booklet with information was provided. I recommend that she can take Celebrex to better manage her pain, and a prescription was provided. \par The patient elected to receive a cortisone injection into her right knee today, and tolerated it well. I instructed the patient on ROM exercises, and told them to take it easy. The use of ice and rest was reviewed with the patient. The patient may resume activities tomorrow. All of her questions were answered. She understands and consents to the plan. \par \par FU 1 month.\par after undergoing a right knee cortisone injection today (09/10/2020).\par after undergoing PT for both legs.\par after taking Celebrex.\par for a left knee cortisone injection if she is still having pain.

## 2020-09-11 NOTE — HISTORY OF PRESENT ILLNESS
[de-identified] : 55 year old female presents complaining of bilateral knee pain, right worse than left. Her right knee is a 9/10 and her left is around a 7/10 in severity. She notes that her knees have been bothering her many years. There was no injury. They have worsened over the past few weeks. She cannot squat or kneel. Her knees bother her when she sleeps and when she extends her right knee. She feels that her right knee will buckle. Stairs are very difficult. She must crawl up the stairs. Her ankles bother her, as well. She had right foot surgery in 2019 and has received cortisone in her foot and ankle over the years. She has not had treatment for her knees. She admits that she has gained about 60 lbs over the past 2 years.

## 2020-09-11 NOTE — CONSULT LETTER
[Dear  ___] : Dear  [unfilled], [Consult Letter:] : I had the pleasure of evaluating your patient, [unfilled]. [Please see my note below.] : Please see my note below. [Sincerely,] : Sincerely, [Consult Closing:] : Thank you very much for allowing me to participate in the care of this patient.  If you have any questions, please do not hesitate to contact me. [FreeTextEntry3] : Sergio Ruffin M.D.

## 2020-10-12 ENCOUNTER — APPOINTMENT (OUTPATIENT)
Dept: ORTHOPEDIC SURGERY | Facility: CLINIC | Age: 55
End: 2020-10-12

## 2020-11-23 ENCOUNTER — TRANSCRIPTION ENCOUNTER (OUTPATIENT)
Age: 55
End: 2020-11-23

## 2020-12-09 ENCOUNTER — APPOINTMENT (OUTPATIENT)
Dept: ORTHOPEDIC SURGERY | Facility: CLINIC | Age: 55
End: 2020-12-09
Payer: COMMERCIAL

## 2020-12-09 PROCEDURE — 20610 DRAIN/INJ JOINT/BURSA W/O US: CPT | Mod: RT

## 2020-12-09 PROCEDURE — 99072 ADDL SUPL MATRL&STAF TM PHE: CPT

## 2020-12-09 PROCEDURE — 73610 X-RAY EXAM OF ANKLE: CPT | Mod: RT

## 2020-12-09 PROCEDURE — 99214 OFFICE O/P EST MOD 30 MIN: CPT | Mod: 25

## 2020-12-24 ENCOUNTER — APPOINTMENT (OUTPATIENT)
Dept: ORTHOPEDIC SURGERY | Facility: CLINIC | Age: 55
End: 2020-12-24
Payer: COMMERCIAL

## 2020-12-24 PROCEDURE — 20611 DRAIN/INJ JOINT/BURSA W/US: CPT | Mod: RT,59

## 2020-12-24 PROCEDURE — 99214 OFFICE O/P EST MOD 30 MIN: CPT | Mod: 25

## 2020-12-24 PROCEDURE — 20610 DRAIN/INJ JOINT/BURSA W/O US: CPT | Mod: LT

## 2020-12-24 PROCEDURE — 99072 ADDL SUPL MATRL&STAF TM PHE: CPT

## 2020-12-28 ENCOUNTER — APPOINTMENT (OUTPATIENT)
Dept: ORTHOPEDIC SURGERY | Facility: CLINIC | Age: 55
End: 2020-12-28
Payer: COMMERCIAL

## 2020-12-28 VITALS — HEIGHT: 66 IN | BODY MASS INDEX: 31.34 KG/M2 | WEIGHT: 195 LBS

## 2020-12-28 PROCEDURE — 99072 ADDL SUPL MATRL&STAF TM PHE: CPT

## 2020-12-28 PROCEDURE — 20611 DRAIN/INJ JOINT/BURSA W/US: CPT | Mod: LT

## 2020-12-28 PROCEDURE — 99214 OFFICE O/P EST MOD 30 MIN: CPT | Mod: 25

## 2020-12-28 RX ORDER — BETAMETHASONE DIPROPIONATE 0.5 MG/G
0.05 OINTMENT TOPICAL
Qty: 45 | Refills: 0 | Status: ACTIVE | COMMUNITY
Start: 2020-09-24

## 2021-01-11 ENCOUNTER — TRANSCRIPTION ENCOUNTER (OUTPATIENT)
Age: 56
End: 2021-01-11

## 2021-01-20 ENCOUNTER — RX RENEWAL (OUTPATIENT)
Age: 56
End: 2021-01-20

## 2021-02-08 ENCOUNTER — APPOINTMENT (OUTPATIENT)
Dept: ORTHOPEDIC SURGERY | Facility: CLINIC | Age: 56
End: 2021-02-08
Payer: COMMERCIAL

## 2021-02-08 DIAGNOSIS — M72.2 PLANTAR FASCIAL FIBROMATOSIS: ICD-10-CM

## 2021-02-08 DIAGNOSIS — M19.079 PRIMARY OSTEOARTHRITIS, UNSPECIFIED ANKLE AND FOOT: ICD-10-CM

## 2021-02-08 PROCEDURE — 99214 OFFICE O/P EST MOD 30 MIN: CPT

## 2021-02-08 PROCEDURE — 99072 ADDL SUPL MATRL&STAF TM PHE: CPT

## 2021-02-08 NOTE — REASON FOR VISIT
[Follow-Up Visit] : a follow-up visit for [Knee Pain] : knee pain [FreeTextEntry2] : right ankle pain

## 2021-02-08 NOTE — PHYSICAL EXAM
[de-identified] : Constitutional\par o Appearance : well-nourished, well developed, alert, in no acute distress \par Head and Face\par o Head :\par ¦ Inspection : atraumatic, normocephalic\par o Face :\par ¦ Inspection : no visible rash or discoloration\par Respiratory\par o Respiratory Effort: breathing unlabored \par Neurologic\par o Mental Status Examination :\par ¦ Orientation : grossly oriented to person, place and time\par Psychiatric\par o Mood and Affect: mood normal, affect appropriate \par \par Right Lower Extremity\par o Buttock : no tenderness, swelling or deformities \par o Right Hip :\par ¦ Inspection/Palpation : no tenderness, swelling or deformities\par ¦ Range of Motion : full and painless in all planes, no crepitance\par ¦ Stability : joint stability intact\par ¦ Strength : extension, flexion, adduction, abduction, internal rotation and external rotation, 4+/5 \par \par o Right Knee :\par ¦ Inspection/Palpation : medial compartment tenderness, no swelling or warmth, varus deformity\par ¦ Range of Motion : 0-130° with discomfort on full flexion, patellofemoral crepitance, good patellofemoral glide \par ¦ Stability : no valgus or varus instability present on provocative testing\par ¦ Strength : flexion and extension 4+/5\par ¦ Tests and Signs : Anterior Drawer negative, Lachman negative, Evans's negative\par \par Right Lower Extremity \par o Right Ankle :\par ¦ Inspection/Palpation : ATFL and fibular ligament .tenderness, mild deltoid ligament tenderness, no ankle joint tenderness, mild swelling laterally\par ¦ Range of Motion : arc of motion full and painless in all planes\par ¦ Stability : no joint instability en provocative testing\par ¦ Strength : plantarflexion, dorsiflexion, inversion and eversion 4/5\par o Skin : no erythema or ecchymosis present\par o Sensation : sensation to pin intact\par ¦ Tests and Signs : Ramos's test negative \par \par Left Lower Extremity\par o Buttock : no tenderness, swelling or deformities \par o Left Hip :\par ¦ Inspection/Palpation : no tenderness, no swelling or deformities\par ¦ Range of Motion : full and painless in all planes, no crepitance\par ¦ Stability : joint stability intact\par ¦ Strength : extension, flexion, adduction, abduction, internal rotation and external rotation, 4+/5\par \par o Left Knee :\par ¦ Inspection/Palpation : medial compartment tenderness to palpation, no swelling\par ¦ Range of Motion : FROM, mild discomfort on full flexion, mild patellofemoral crepitance, good patellofemoral glide\par ¦ Stability : no valgus or varus instability present on provocative testing\par ¦ Strength : flexion and extension 4+/5\par ¦ Tests and Signs : Anterior Drawer negative, Lachman negative, Evans's negative\par \par Gait : varus deformity on the right, milder deformity  on the left, no significant extremity swelling or lymphedema, good proprioception and balance

## 2021-02-08 NOTE — HISTORY OF PRESENT ILLNESS
[de-identified] : 55 year old female presents complaining of bilateral knee pain, right worse than left. She admits that she has gained about 60 lbs over the past 2 years, and is working on losing weight. She received a right knee cortisone injection on 12/9/2020, followed by a Monovisc injection on 12/24/2020. She received a left knee cortisone injection on 12/24/2020, followed by a Monovisc injection on 12/28/2020. She reports 50% relief in both knees after these gel shots. She is also complaining of right ankle pain, swelling and instability. Her symptoms are worse laterally. She had right foot surgery in 2019 and has received cortisone in her foot and ankle over the years. She states that she cannot go to physical therapy at this time and would like to discuss home exercises.\par \par Radiology Results done 9/10/2020 revealed:\par o Right Knee: Standing AP, lateral and tunnel views were obtained and revealed significant medial and patellofemoral arthritis.\par o Left Knee: Standing AP, lateral and tunnel views were obtained and revealed significant medial and severe patellofemoral arthritis.\par \par Radiology Results done 12/9/2020:\par o Right Ankle : AP, lateral and mortise views were obtained and reveal mild degenerative arthritis of the ankle and midfoot.

## 2021-02-08 NOTE — DISCUSSION/SUMMARY
[de-identified] : I went over the pathophysiology of the patient's symptoms in great detail with the patient. We discussed the use of ice, Tylenol and anti-inflammatories to relieve pain. At-home strengthening exercises were discussed and demonstrated in great detail with the patient, with an exercise sheet being provided. She should purchase Velcro ankle weights that range from 0-5 pounds and should begin a diligent at-home exercise program. I recommended that she uses a Thera-band to practice ankle presses, and supplied her with a blue one. I stressed the importance of continued weight loss and its benefits to the patient’s joints and overall health. All of her questions were answered. She understands and consents to the plan. \par \par FU 2 months.\par after following a diligent HEP with weights and bands.\par after focusing on losing weight.

## 2021-02-08 NOTE — ADDENDUM
[FreeTextEntry1] : I, Bhanu Harrison, acted solely as a scribe for Dr. Sergio Ruffin on this date 02/08/2021.\par All medical record entries made by the Scribe were at my, Dr. Sergio Ruffin, direction and personally dictated by me on 02/08/2021. I have reviewed the chart and agree that the record accurately reflects my personal performance of the history, physical exam, assessment and plan. I have also personally directed, reviewed, and agreed with the chart.

## 2021-04-08 ENCOUNTER — APPOINTMENT (OUTPATIENT)
Dept: ORTHOPEDIC SURGERY | Facility: CLINIC | Age: 56
End: 2021-04-08
Payer: COMMERCIAL

## 2021-04-08 ENCOUNTER — APPOINTMENT (OUTPATIENT)
Dept: RHEUMATOLOGY | Facility: CLINIC | Age: 56
End: 2021-04-08

## 2021-04-08 PROCEDURE — 20610 DRAIN/INJ JOINT/BURSA W/O US: CPT | Mod: RT

## 2021-04-08 PROCEDURE — 99214 OFFICE O/P EST MOD 30 MIN: CPT | Mod: 25

## 2021-04-08 PROCEDURE — 99072 ADDL SUPL MATRL&STAF TM PHE: CPT

## 2021-04-08 NOTE — DISCUSSION/SUMMARY
[de-identified] : I went over the pathophysiology of the patient's symptoms in great detail with the patient. I informed her that injections such as cortisone and viscosupplementation are short term solutions, and that surgery will be required to provide her with long term relief. Patient understands she needs to consider knee replacement given conservative measures are not providing enough relief. We discussed the use of ice, Tylenol and anti-inflammatories to relieve pain. The patient elected to receive a cortisone injection into her right knee today, and tolerated it well. I instructed the patient on ROM exercises, and told them to take it easy. The use of ice and rest was reviewed with the patient. The patient may resume activities tomorrow. I stressed the importance of continued weight loss and its benefits to the patient’s joints and overall health. I informed her that she is due for right knee viscosupplementation on 6/24/21 and left knee on 6/28/21. We discussed trying a 3-series viscosupplementation product in her right knee as the Monovisc did not seem to give her relief. We will obtain new bilateral knee x-rays upon her follow-up visit in 1 month. All of her questions were answered. She understands and consents to the plan.\par \par FU 1 month.\par after a right knee cortisone injection today (04/08/2021).\par for new bilateral knee x-rays.\par \par The patient is an 55 year old female with significant arthritis of their right knee. Based upon the patient's continued symptoms and failure to respond to conservative treatment I have recommended a total knee replacement for this patient. A long discussion took place with the patient describing what a total joint replacement is and what the procedure would entail. A total knee model, similar to the implant that will be used during the operation was utilized to demonstrate and to discuss the various bearing surfaces of the implants. The hospitalization and post-operative care and rehabilitation were also discussed. The use of perioperative antibiotics and DVT prophylaxis were discussed. The risk, benefits and alternatives to a surgical intervention were discussed at length with the patient. The patient was also advised of risks related to the medical comorbidities and elevated body mass index (BMI). A lengthy discussion took place to review the most common complications including but not limited to: deep vein thrombosis, pulmonary embolus, heart attack, stroke, infection, wound breakdown, numbness, damage to nerves, tendon, muscles, arteries or other blood vessels, death and other possible complications from anesthesia. The patient was told that we will take steps to minimize these risks by using sterile technique, antibiotics and DVT prophylaxis when appropriate and follow the patient postoperatively in the office setting to monitor progress. The possibility of recurrent pain, no improvement in pain and actual worsening of pain were also discussed with the patient.\par \par The discharge plan of care focused on the patient going home following surgery. I encouraged the patient to make the necessary arrangements to have someone stay with them when they are discharged home. Following discharge, a home care nurse will visit the patient. They will open your home care case and request home physical therapy services. Home physical therapy will commence following discharge provided it is appropriate and covered by her health insurance benefit plan.\par \par The risks, benefits and alternative treatment options of total joint replacement were reviewed with the patient at great length. All questions were answered to the satisfaction of the patient. The patient participated in an interactive discussion of the total knee replacement implant planned for their surgery with questions answered. The patient agreed with the treatment plan, and has decided to move forward with the elective total knee replacement as planned.\par \par TJ SAMUELS was seen face to face and needs a commode for bedside use as the patient has no ability to acces the bathroom in their home. The patient also requires a rolling walker as this is needed for activities of daily living within their home secondary to the diagnosis of osteoarthritis.

## 2021-04-08 NOTE — ADDENDUM
[FreeTextEntry1] : I, Bhanu Harrison, acted solely as a scribe for Dr. Sergio Ruffin on this date 04/08/2021.\par All medical record entries made by the Scribe were at my, Dr. Sergio Ruffin, direction and personally dictated by me on 04/08/2021. I have reviewed the chart and agree that the record accurately reflects my personal performance of the history, physical exam, assessment and plan. I have also personally directed, reviewed, and agreed with the chart.

## 2021-04-08 NOTE — PHYSICAL EXAM
[de-identified] : Constitutional\par o Appearance : well-nourished, well developed, alert, in no acute distress \par Head and Face\par o Head :\par ¦ Inspection : atraumatic, normocephalic\par o Face :\par ¦ Inspection : no visible rash or discoloration\par Respiratory\par o Respiratory Effort: breathing unlabored \par Neurologic\par o Mental Status Examination :\par ¦ Orientation : grossly oriented to person, place and time\par Psychiatric\par o Mood and Affect: mood normal, affect appropriate \par \par Right Lower Extremity\par o Buttock : no tenderness, swelling or deformities \par o Right Hip :\par ¦ Inspection/Palpation : no tenderness, swelling or deformities\par ¦ Range of Motion : full and painless in all planes, no crepitance\par ¦ Stability : joint stability intact\par ¦ Strength : extension, flexion, adduction, abduction, internal rotation and external rotation, 4+/5 \par \par o Right Knee :\par ¦ Inspection/Palpation : significant medial joint line tenderness, significant medial and lateral facet tenderness, no swelling or warmth, varus deformity\par ¦ Range of Motion : 0-130° with pain on full flexion, patellofemoral crepitance, good patellofemoral glide \par ¦ Stability : no valgus or varus instability present on provocative testing\par ¦ Strength : flexion and extension 4+/5\par ¦ Tests and Signs : Anterior Drawer negative, Lachman negative, Evans's negative\par \par Left Lower Extremity\par o Buttock : no tenderness, swelling or deformities \par o Left Hip :\par ¦ Inspection/Palpation : no tenderness, no swelling or deformities\par ¦ Range of Motion : full and painless in all planes, no crepitance\par ¦ Stability : joint stability intact\par ¦ Strength : extension, flexion, adduction, abduction, internal rotation and external rotation, 4+/5\par \par o Left Knee :\par ¦ Inspection/Palpation : mild medial compartment tenderness to palpation, no swelling, mild varus deformity\par ¦ Range of Motion : FROM, mild discomfort on full flexion, mild patellofemoral crepitance, good patellofemoral glide\par ¦ Stability : no valgus or varus instability present on provocative testing\par ¦ Strength : flexion and extension 4+/5\par ¦ Tests and Signs : Anterior Drawer negative, Lachman negative, Evans's negative\par \par Gait : varus deformity on the right, milder deformity on the left, no significant extremity swelling or lymphedema, good proprioception and balance\par \par o Right Knee injection : Indication- knee osteoarthritis, Anatomic location- right intra-articular joint space, Spray - area was sterilized with Betadine and alcohol and anesthetized with Ethyl Chloride , needle used-20G, Medications given- 5cc's lidocaine, 0.5cc's kenalog, 0.5 cc's dexamethasone, and patient tolerated it well.

## 2021-04-08 NOTE — HISTORY OF PRESENT ILLNESS
[de-identified] : 55 year old female presents complaining of bilateral knee pain, right worse than left. She admits that she has gained about 60 lbs over the past 2 years, and is working on losing weight. She has lost 9 pounds. She received right knee cortisone on 12/9/20 and Monovisc on 12/24/20. She received left knee cortisone on 12/24/20 and Monovisc on 12/28/20. She had about 50% relief from these injections. Her right knee symptoms have returned to pre-injection levels. She is complaining of constant right knee pain, and left knee pain when walking. Her pain is medial on both knees. Her right knee has been buckling and she has fallen 3 times due to this. She states that she cannot sleep or kneel due to right knee pain. She is taking Celebrex and Tylenol.\par \par Radiology Results done 9/10/2020 revealed:\par o Right Knee: Standing AP, lateral and tunnel views were obtained and revealed significant medial and patellofemoral arthritis.\par o Left Knee: Standing AP, lateral and tunnel views were obtained and revealed significant medial and severe patellofemoral arthritis.

## 2021-05-13 ENCOUNTER — APPOINTMENT (OUTPATIENT)
Dept: ORTHOPEDIC SURGERY | Facility: CLINIC | Age: 56
End: 2021-05-13
Payer: COMMERCIAL

## 2021-05-13 PROCEDURE — 99214 OFFICE O/P EST MOD 30 MIN: CPT | Mod: 25

## 2021-05-13 PROCEDURE — 20610 DRAIN/INJ JOINT/BURSA W/O US: CPT | Mod: LT

## 2021-05-13 PROCEDURE — 99072 ADDL SUPL MATRL&STAF TM PHE: CPT

## 2021-05-13 PROCEDURE — 73562 X-RAY EXAM OF KNEE 3: CPT | Mod: 50

## 2021-05-13 NOTE — HISTORY OF PRESENT ILLNESS
[de-identified] : 55 year old female presents complaining of bilateral knee pain, left worse than right. She had right knee Monovisc on 12/24/2020, and left knee Monovisc on 12/28/2020. She noted about 50% from these injections. Her relief on the right has worn off. She received a right knee cortisone injection on 4/8/2021 and notes 30-40% improvement. Her left knee has been acting up and is now the most bothersome. She notes pain when walking and sleeping at night. She notes night pain that makes sleeping difficult. She is taking Celebrex and Tylenol. She admits that she has gained about 60 lbs over the past 2 years. She is working on losing weight.\par \par Radiology Results taken on 9/10/2020:\par o Right Knee: Standing AP, lateral and tunnel views were obtained and revealed significant medial and patellofemoral arthritis.\par o Left Knee: Standing AP, lateral and tunnel views were obtained and revealed significant medial and severe patellofemoral arthritis.

## 2021-05-13 NOTE — DISCUSSION/SUMMARY
[de-identified] : I discussed the underlying pathophysiology of the patient's condition in great detail with the patient. I went over the patient's x-rays with them in great detail. The patient elected to receive a cortisone injection into her left knee today, and tolerated it well. I instructed the patient on ROM exercises, and told them to take it easy. The use of ice and rest was reviewed with the patient. The patient may resume activities tomorrow. Viscosupplementation was discussed as a solution to the patient's symptoms, and we are requesting authorization for Monovisc for both knees. She is due for another gel shot after 6/24/2021 on the right, and after 6/28/2021 on the left. \par I informed her that injections such as cortisone and Viscosupplementation are short term solutions, and that surgery will be required to provide them long term relief from their symptoms. Patient understands she needs to consider knee replacement given she is losing motion and conservative measures are not providing enough relief. All of her questions were answered. She understands and consents to the plan.\par \par FU 2-3 weeks.\par after a left knee cortisone injection today (05/13/2021).\par after viscosupplementation authorization.\par after considering the timing for a right TKR.\par \par The patient is an 56 year old female with bone on bone arthritis of their right knee. Based upon the patient's continued symptoms and failure to respond to conservative treatment I have recommended a total knee replacement for this patient. A long discussion took place with the patient describing what a total joint replacement is and what the procedure would entail. A total knee model, similar to the implant that will be used during the operation was utilized to demonstrate and to discuss the various bearing surfaces of the implants. The hospitalization and post-operative care and rehabilitation were also discussed. The use of perioperative antibiotics and DVT prophylaxis were discussed. The risk, benefits and alternatives to a surgical intervention were discussed at length with the patient. The patient was also advised of risks related to the medical comorbidities and elevated body mass index (BMI). A lengthy discussion took place to review the most common complications including but not limited to: deep vein thrombosis, pulmonary embolus, heart attack, stroke, infection, wound breakdown, numbness, damage to nerves, tendon, muscles, arteries or other blood vessels, death and other possible complications from anesthesia. The patient was told that we will take steps to minimize these risks by using sterile technique, antibiotics and DVT prophylaxis when appropriate and follow the patient postoperatively in the office setting to monitor progress. The possibility of recurrent pain, no improvement in pain and actual worsening of pain were also discussed with the patient.\par \par The discharge plan of care focused on the patient going home following surgery. I encouraged the patient to make the necessary arrangements to have someone stay with them when they are discharged home. Following discharge, a home care nurse will visit the patient. They will open your home care case and request home physical therapy services. Home physical therapy will commence following discharge provided it is appropriate and covered by her health insurance benefit plan.\par \par The risks, benefits and alternative treatment options of total joint replacement were reviewed with the patient at great length. All questions were answered to the satisfaction of the patient. The patient participated in an interactive discussion of the total knee replacement implant planned for their surgery with questions answered. The patient agreed with the treatment plan, and has decided to move forward with the elective total knee replacement as planned.\par \par TJ SAMUELS was seen face to face and needs a commode for bedside use as the patient has no ability to acces the bathroom in their home. The patient also requires a rolling walker as this is needed for activities of daily living within their home secondary to the diagnosis of osteoarthritis.

## 2021-05-13 NOTE — PHYSICAL EXAM
[de-identified] : Constitutional\par o Appearance : well-nourished, well developed, alert, in no acute distress \par Head and Face\par o Head :\par ¦ Inspection : atraumatic, normocephalic\par o Face :\par ¦ Inspection : no visible rash or discoloration\par Respiratory\par o Respiratory Effort: breathing unlabored \par Neurologic\par o Mental Status Examination :\par ¦ Orientation : grossly oriented to person, place and time\par Psychiatric\par o Mood and Affect: mood normal, affect appropriate \par \par Right Lower Extremity\par o Buttock : no tenderness, swelling or deformities \par o Right Hip :\par ¦ Inspection/Palpation : no tenderness, swelling or deformities\par ¦ Range of Motion : full and painless in all planes, no crepitance\par ¦ Stability : joint stability intact\par ¦ Strength : extension, flexion, adduction, abduction, internal rotation and external rotation, 4+/5 \par \par o Right Knee :\par ¦ Inspection/Palpation : medial and mild lateral compartment tenderness, medial and lateral patellar facet tenderness, no swelling\par ¦ Range of Motion : 2-130° with pain on full flexion, patellofemoral crepitance, good patellofemoral glide \par ¦ Stability : no valgus or varus instability present on provocative testing\par ¦ Strength : flexion and extension 4+/5\par ¦ Tests and Signs : Anterior Drawer negative, Lachman negative, Evans's negative\par \par Left Lower Extremity\par o Buttock : no tenderness, swelling or deformities \par o Left Hip :\par ¦ Inspection/Palpation : no tenderness, no swelling or deformities\par ¦ Range of Motion : full and painless in all planes, no crepitance\par ¦ Stability : joint stability intact\par ¦ Strength : extension, flexion, adduction, abduction, internal rotation and external rotation, 4+/5\par \par o Left Knee :\par ¦ Inspection/Palpation : medial and mild lateral compartment tenderness, medial and lateral patellar facet tenderness, no swelling, mild varus deformity\par ¦ Range of Motion : FROM, pain on full flexion, mild patellofemoral crepitance, good patellofemoral glide\par ¦ Stability : no valgus or varus instability present on provocative testing\par ¦ Strength : flexion and extension 4+/5\par ¦ Tests and Signs : Anterior Drawer negative, Lachman negative, Evans's negative\par \par Gait : varus deformity on the right, milder deformity on the left, no significant extremity swelling or lymphedema, good proprioception and balance\par \par Radiology Results:\par o Right Knee : Standing AP, lateral and tunnel views of the knee were obtained and revealed severe medial and patellofemoral arthritis.\par o Left Knee : Standing AP, lateral and tunnel views of the knee were obtained and revealed moderate to severe medial compartment narrowing with severe patellofemoral arthritis.\par \par o Left Knee injection : Indication- knee osteoarthritis, Anatomic location- left intra-articular joint space, Spray - area was sterilized with Betadine and alcohol and anesthetized with Ethyl Chloride , needle used-20G, Medications given- 5cc's lidocaine, 0.5cc's kenalog, 0.5 cc's dexamethasone, and patient tolerated it well.

## 2021-05-13 NOTE — ADDENDUM
[FreeTextEntry1] : I, Bhanu Harrison, acted solely as a scribe for Dr. Sergio Ruffin on this date 05/13/2021.\par All medical record entries made by the Scribe were at my, Dr. Sergio Ruffin, direction and personally dictated by me on 05/13/2021. I have reviewed the chart and agree that the record accurately reflects my personal performance of the history, physical exam, assessment and plan. I have also personally directed, reviewed, and agreed with the chart.

## 2021-06-24 ENCOUNTER — APPOINTMENT (OUTPATIENT)
Dept: ORTHOPEDIC SURGERY | Facility: CLINIC | Age: 56
End: 2021-06-24
Payer: COMMERCIAL

## 2021-06-24 PROCEDURE — 73562 X-RAY EXAM OF KNEE 3: CPT | Mod: RT

## 2021-06-24 PROCEDURE — 99072 ADDL SUPL MATRL&STAF TM PHE: CPT

## 2021-06-24 PROCEDURE — 99214 OFFICE O/P EST MOD 30 MIN: CPT | Mod: 25

## 2021-06-24 PROCEDURE — 20611 DRAIN/INJ JOINT/BURSA W/US: CPT | Mod: LT

## 2021-06-24 NOTE — ADDENDUM
[FreeTextEntry1] : Documented by Sergio Pickard acting as a scribe for Dr. Sergio Ruffin (06/24/2021).\par \par All medical record entries made by the Scribe were at my, Dr. Sergio Ruffin, direction and personally dictated by me on (06/24/2021). I have reviewed the chart and agree that the record accurately reflects my personal performance of the history, physical exam, assessment and plan. I have also personally directed, reviewed, and agree with the discharge instructions.\par

## 2021-06-24 NOTE — HISTORY OF PRESENT ILLNESS
[de-identified] : 56 year old female presents complaining of bilateral knee pain, left worse than right. She had right knee Monovisc on 12/24/2020, and left knee Monovisc on 12/28/2020. She noted about 50% from these injections. Her relief on the right has worn off. She received a right knee cortisone injection on 4/8/2021 and notes 30-40% improvement. Her left knee has been acting up and is now the most bothersome. She notes pain when walking and sleeping at night. She notes night pain that makes sleeping difficult. She is taking Celebrex and Tylenol. She admits that she has gained about 60 lbs over the past 2 years. She is working on losing 17 pounds total since her last visit.\par \par Radiology Results from 5/13/21:\par o Right Knee : Standing AP, lateral and tunnel views of the knee were obtained and revealed severe medial and patellofemoral arthritis.\par o Left Knee : Standing AP, lateral and tunnel views of the knee were obtained and revealed moderate to severe medial compartment narrowing with severe patellofemoral arthritis.\par

## 2021-06-24 NOTE — PHYSICAL EXAM
[de-identified] : Constitutional\par o Appearance : well-nourished, well developed, alert, in no acute distress \par Head and Face\par o Head :\par ¦ Inspection : atraumatic, normocephalic\par o Face :\par ¦ Inspection : no visible rash or discoloration\par Respiratory\par o Respiratory Effort: breathing unlabored \par Neurologic\par o Mental Status Examination :\par ¦ Orientation : grossly oriented to person, place and time\par Psychiatric\par o Mood and Affect: mood normal, affect appropriate \par \par Right Lower Extremity\par o Buttock : no tenderness, swelling or deformities \par o Right Hip :\par ¦ Inspection/Palpation : no tenderness, swelling or deformities\par ¦ Range of Motion : full and painless in all planes, no crepitance\par ¦ Stability : joint stability intact\par ¦ Strength : extension, flexion, adduction, abduction, internal rotation and external rotation, 4+/5 \par \par o Right Knee :\par ¦ Inspection/Palpation : medial and mild lateral compartment tenderness, medial and lateral patellar facet tenderness, minimal swelling severe petallofemoral tenderness\par ¦ Range of Motion : 2-130° with pain on full flexion, patellofemoral crepitance, good patellofemoral glide \par ¦ Stability : no valgus or varus instability present on provocative testing\par ¦ Strength : flexion and extension 4+/5\par ¦ Tests and Signs : Anterior Drawer negative, Lachman negative, Evans's negative\par \par Left Lower Extremity\par o Buttock : no tenderness, swelling or deformities \par o Left Hip :\par ¦ Inspection/Palpation : no tenderness, no swelling or deformities\par ¦ Range of Motion : full and painless in all planes, no crepitance\par ¦ Stability : joint stability intact\par ¦ Strength : extension, flexion, adduction, abduction, internal rotation and external rotation, 4+/5\par \par o Left Knee :\par ¦ Inspection/Palpation : medial and mild lateral compartment tenderness, medial and lateral patellar facet tenderness, no swelling, mild varus deformity mild tenderness of patellofemoral \par ¦ Range of Motion : FROM, pain on full flexion, mild patellofemoral crepitance, good patellofemoral glide\par ¦ Stability : no valgus or varus instability present on provocative testing\par ¦ Strength : flexion and extension 4+/5\par ¦ Tests and Signs : Anterior Drawer negative, Lachman negative, Evans's negative\par \par Gait : varus deformity on the right, milder deformity on the left, no significant extremity swelling or lymphedema, good proprioception and balance\par \par o Knee injection : Indication- knee osteoarthritis, Anatomic location- left knee intra-articular joint space, Spray - area was sterilized with Betadine and alcohol and anesthetized with Ethyl Chloride , needle used-20G, Medications given- 3cc's Durolane under Ultrasound guidance, and patient tolerated it well. \par Lot #[id number]      EXP: 2023-10-31\par \par Radiology Results \par o Right Knee : Standing AP, lateral and tunnel views of the knee were obtained and revealed severe medial and patellofemoral arthritis.  These are templated films

## 2021-06-24 NOTE — DISCUSSION/SUMMARY
[de-identified] : I discussed the underlying pathophysiology of the patient's condition in great detail with the patient. I went over the patient's x-rays with them in great detail. I went over the pathophysiology of the patient's symptoms in great detail with the patient. I informed the patient that surgery will be required to provide them long term relief from their symptoms. The proper pre and post operative procedures and expectations were discussed in extensive detail with the patient. We had a lengthy discussion about the patient's issues, and talked about the benefits and risks of the procedure. She should begin to obtain medical clearance. Discussed at length the natural history of  RIGHT knee degenerative arthritis and reviewed non-operative and operative treatment. Due to the pain, failure of prior nonoperative treatment including injections, NSAIDs, and physiotherapy, and associated disability I recommend a RIGHT total knee replacement. The risks, benefits, convalescence and alternatives were reviewed. Numerous questions were asked and answered. We did discuss implant choice and fixation, with shared decision making with the patient. Models were used as an educational tool. Surgery will be scheduled at a convenient time. Preop medical clearance.  I informed the patient that surgery will be required to provide them long term relief from their symptoms. The proper pre and post operative procedures and expectations were discussed in extensive detail with the patient. The patient elected to receive a durolane injection into her left knee today, and tolerated it well. I instructed the patient on ROM exercises, and told them to take it easy. The use of ice and rest was reviewed with the patient. The patient may resume activities tomorrow. I reminded the patient that it takes 4 to 6 weeks after the injection to feel symptom relief.  All of her questions were answered. She understands and consents to the plan. \par  \par \par FU 6 weeks.\par after a left knee Durolane injection today (06/24/2021).\par after considering the timing for a right TKR.\par after Xrays

## 2021-07-06 ENCOUNTER — OUTPATIENT (OUTPATIENT)
Dept: OUTPATIENT SERVICES | Facility: HOSPITAL | Age: 56
LOS: 1 days | End: 2021-07-06
Payer: COMMERCIAL

## 2021-07-06 VITALS
OXYGEN SATURATION: 99 % | RESPIRATION RATE: 16 BRPM | HEIGHT: 66 IN | TEMPERATURE: 98 F | DIASTOLIC BLOOD PRESSURE: 77 MMHG | WEIGHT: 184.97 LBS | HEART RATE: 86 BPM | SYSTOLIC BLOOD PRESSURE: 114 MMHG

## 2021-07-06 DIAGNOSIS — Z98.890 OTHER SPECIFIED POSTPROCEDURAL STATES: Chronic | ICD-10-CM

## 2021-07-06 DIAGNOSIS — M17.11 UNILATERAL PRIMARY OSTEOARTHRITIS, RIGHT KNEE: ICD-10-CM

## 2021-07-06 DIAGNOSIS — Z01.818 ENCOUNTER FOR OTHER PREPROCEDURAL EXAMINATION: ICD-10-CM

## 2021-07-06 DIAGNOSIS — Z98.82 BREAST IMPLANT STATUS: Chronic | ICD-10-CM

## 2021-07-06 DIAGNOSIS — Z98.51 TUBAL LIGATION STATUS: Chronic | ICD-10-CM

## 2021-07-06 LAB
ALBUMIN SERPL ELPH-MCNC: 3.9 G/DL — SIGNIFICANT CHANGE UP (ref 3.3–5)
ALP SERPL-CCNC: 71 U/L — SIGNIFICANT CHANGE UP (ref 30–120)
ALT FLD-CCNC: 39 U/L DA — SIGNIFICANT CHANGE UP (ref 10–60)
ANION GAP SERPL CALC-SCNC: 8 MMOL/L — SIGNIFICANT CHANGE UP (ref 5–17)
APTT BLD: 30.8 SEC — SIGNIFICANT CHANGE UP (ref 27.5–35.5)
AST SERPL-CCNC: 26 U/L — SIGNIFICANT CHANGE UP (ref 10–40)
BILIRUB SERPL-MCNC: 0.4 MG/DL — SIGNIFICANT CHANGE UP (ref 0.2–1.2)
BLD GP AB SCN SERPL QL: SIGNIFICANT CHANGE UP
BUN SERPL-MCNC: 17 MG/DL — SIGNIFICANT CHANGE UP (ref 7–23)
CALCIUM SERPL-MCNC: 9.5 MG/DL — SIGNIFICANT CHANGE UP (ref 8.4–10.5)
CHLORIDE SERPL-SCNC: 106 MMOL/L — SIGNIFICANT CHANGE UP (ref 96–108)
CO2 SERPL-SCNC: 26 MMOL/L — SIGNIFICANT CHANGE UP (ref 22–31)
CREAT SERPL-MCNC: 0.77 MG/DL — SIGNIFICANT CHANGE UP (ref 0.5–1.3)
GLUCOSE SERPL-MCNC: 103 MG/DL — HIGH (ref 70–99)
HCT VFR BLD CALC: 41.7 % — SIGNIFICANT CHANGE UP (ref 34.5–45)
HGB BLD-MCNC: 14 G/DL — SIGNIFICANT CHANGE UP (ref 11.5–15.5)
INR BLD: 1.03 RATIO — SIGNIFICANT CHANGE UP (ref 0.88–1.16)
MCHC RBC-ENTMCNC: 32.6 PG — SIGNIFICANT CHANGE UP (ref 27–34)
MCHC RBC-ENTMCNC: 33.6 GM/DL — SIGNIFICANT CHANGE UP (ref 32–36)
MCV RBC AUTO: 97 FL — SIGNIFICANT CHANGE UP (ref 80–100)
MRSA PCR RESULT.: SIGNIFICANT CHANGE UP
NRBC # BLD: 0 /100 WBCS — SIGNIFICANT CHANGE UP (ref 0–0)
PLATELET # BLD AUTO: 383 K/UL — SIGNIFICANT CHANGE UP (ref 150–400)
POTASSIUM SERPL-MCNC: 4.3 MMOL/L — SIGNIFICANT CHANGE UP (ref 3.5–5.3)
POTASSIUM SERPL-SCNC: 4.3 MMOL/L — SIGNIFICANT CHANGE UP (ref 3.5–5.3)
PROT SERPL-MCNC: 7.5 G/DL — SIGNIFICANT CHANGE UP (ref 6–8.3)
PROTHROM AB SERPL-ACNC: 12.5 SEC — SIGNIFICANT CHANGE UP (ref 10.6–13.6)
RBC # BLD: 4.3 M/UL — SIGNIFICANT CHANGE UP (ref 3.8–5.2)
RBC # FLD: 12.4 % — SIGNIFICANT CHANGE UP (ref 10.3–14.5)
S AUREUS DNA NOSE QL NAA+PROBE: SIGNIFICANT CHANGE UP
SODIUM SERPL-SCNC: 140 MMOL/L — SIGNIFICANT CHANGE UP (ref 135–145)
WBC # BLD: 8.45 K/UL — SIGNIFICANT CHANGE UP (ref 3.8–10.5)
WBC # FLD AUTO: 8.45 K/UL — SIGNIFICANT CHANGE UP (ref 3.8–10.5)

## 2021-07-06 PROCEDURE — 36415 COLL VENOUS BLD VENIPUNCTURE: CPT

## 2021-07-06 PROCEDURE — 85730 THROMBOPLASTIN TIME PARTIAL: CPT

## 2021-07-06 PROCEDURE — 87640 STAPH A DNA AMP PROBE: CPT

## 2021-07-06 PROCEDURE — 87641 MR-STAPH DNA AMP PROBE: CPT

## 2021-07-06 PROCEDURE — 86850 RBC ANTIBODY SCREEN: CPT

## 2021-07-06 PROCEDURE — 93010 ELECTROCARDIOGRAM REPORT: CPT

## 2021-07-06 PROCEDURE — 85027 COMPLETE CBC AUTOMATED: CPT

## 2021-07-06 PROCEDURE — G0463: CPT

## 2021-07-06 PROCEDURE — 80053 COMPREHEN METABOLIC PANEL: CPT

## 2021-07-06 PROCEDURE — 86900 BLOOD TYPING SEROLOGIC ABO: CPT

## 2021-07-06 PROCEDURE — 93005 ELECTROCARDIOGRAM TRACING: CPT

## 2021-07-06 PROCEDURE — 85610 PROTHROMBIN TIME: CPT

## 2021-07-06 PROCEDURE — 86901 BLOOD TYPING SEROLOGIC RH(D): CPT

## 2021-07-06 RX ORDER — ATORVASTATIN CALCIUM 80 MG/1
1 TABLET, FILM COATED ORAL
Qty: 0 | Refills: 0 | DISCHARGE

## 2021-07-06 RX ORDER — LEVOTHYROXINE SODIUM 125 MCG
1 TABLET ORAL
Qty: 0 | Refills: 0 | DISCHARGE

## 2021-07-06 NOTE — H&P PST ADULT - NSICDXPASTSURGICALHX_GEN_ALL_CORE_FT
PAST SURGICAL HISTORY:  H/O breast implant ,  exchanged    H/O lumpectomy , left    H/O tubal ligation      S/P surgical removal of pilonidal cyst     Status post right foot surgery

## 2021-07-06 NOTE — H&P PST ADULT - NSICDXPASTMEDICALHX_GEN_ALL_CORE_FT
PAST MEDICAL HISTORY:  Anxiety and depression     Eczema hands and feet    Fibromyalgia     GERD (gastroesophageal reflux disease)     Gluten intolerance     H/O connective tissue disease     History of 2019 novel coronavirus disease (COVID-19) 11/2020; no complication    Hypercholesterolemia     Hypertension     Hypothyroidism     Neutrophilic dermatosis     Osteoarthritis of right knee

## 2021-07-06 NOTE — H&P PST ADULT - HISTORY OF PRESENT ILLNESS
57 yo female is scheduled for right total knee replacement on 7/20/2021.  Reports right knee pain with swelling for many years which has recently increased in intensity.  She has tried HA injections without relief.  Pain rates 10/10 at worst at night after weight bearing and ascending stairs.

## 2021-07-06 NOTE — H&P PST ADULT - NSICDXFAMILYHX_GEN_ALL_CORE_FT
FAMILY HISTORY:  FH: brain aneurysm, Mother-   FH: hypertension, Mother-  and younger sister-Alive

## 2021-07-06 NOTE — H&P PST ADULT - NSICDXPROBLEM_GEN_ALL_CORE_FT
PROBLEM DIAGNOSES  Problem: Osteoarthritis of right knee  Assessment and Plan: Right total knee replacement is planned for 7/20/21.  Covid PCR testing scheduled for 7/19/21 @ [facility].  Diagnostic testing performed.  medical clearance requested.  patient instructied to obtain instructions for dupixeant and clearance for elbow blisters.  dr Ruffin's office wasmade aware of skin condition.  Pre op instructions were reviewed; contact info given; best wishes offered.

## 2021-07-08 ENCOUNTER — APPOINTMENT (OUTPATIENT)
Dept: CARDIOLOGY | Facility: CLINIC | Age: 56
End: 2021-07-08
Payer: COMMERCIAL

## 2021-07-08 ENCOUNTER — NON-APPOINTMENT (OUTPATIENT)
Age: 56
End: 2021-07-08

## 2021-07-08 VITALS
OXYGEN SATURATION: 96 % | HEART RATE: 90 BPM | SYSTOLIC BLOOD PRESSURE: 128 MMHG | DIASTOLIC BLOOD PRESSURE: 70 MMHG | BODY MASS INDEX: 29.55 KG/M2 | HEIGHT: 68 IN | WEIGHT: 195 LBS | TEMPERATURE: 98.5 F

## 2021-07-08 PROCEDURE — 93000 ELECTROCARDIOGRAM COMPLETE: CPT | Mod: NC

## 2021-07-08 PROCEDURE — 99214 OFFICE O/P EST MOD 30 MIN: CPT

## 2021-07-08 PROCEDURE — 99072 ADDL SUPL MATRL&STAF TM PHE: CPT

## 2021-07-09 NOTE — HISTORY OF PRESENT ILLNESS
[FreeTextEntry1] : I was asked to see this delightful patient today for Pre-op Evaluation  prior to  _Rt Knee____  with   Dr. mas at [phone]   .  The patient denies fever, cough, wheezing, sputum production, hemoptysis, dyspnea, congestion, diarrhea, constipation, nausea, vomiting, bright red blood per rectum, melena, angina, chest pain, palpitations, diaphoresis, PND, incontinence, frequency, urgency, dysuria, edema, joint pain, headache, weakness, numbness and dizziness. The date of the planned procedure is: __july 20 th\par \par

## 2021-07-14 ENCOUNTER — APPOINTMENT (OUTPATIENT)
Dept: ORTHOPEDIC SURGERY | Facility: CLINIC | Age: 56
End: 2021-07-14
Payer: COMMERCIAL

## 2021-07-14 PROCEDURE — 99214 OFFICE O/P EST MOD 30 MIN: CPT

## 2021-07-14 PROCEDURE — 99072 ADDL SUPL MATRL&STAF TM PHE: CPT

## 2021-07-14 NOTE — PHYSICAL EXAM
[de-identified] : Constitutional\par o Appearance : well-nourished, well developed, alert, in no acute distress \par Head and Face\par o Head :\par ¦ Inspection : atraumatic, normocephalic\par o Face :\par ¦ Inspection : no visible rash or discoloration\par Respiratory\par o Respiratory Effort: breathing unlabored \par Neurologic\par o Mental Status Examination :\par ¦ Orientation : grossly oriented to person, place and time\par Psychiatric\par o Mood and Affect: mood normal, affect appropriate \par \par Right Lower Extremity\par o Buttock : no tenderness, swelling or deformities \par o Right Hip :\par ¦ Inspection/Palpation : no tenderness, swelling or deformities\par ¦ Range of Motion : full and painless in all planes, no crepitance\par ¦ Stability : joint stability intact\par ¦ Strength : extension, flexion, adduction, abduction, internal rotation and external rotation, 4+/5 \par \par o Right Knee :\par ¦ Inspection/Palpation : medial and no lateral compartment tenderness, severe patellofemoral tenderness, minimal swelling \par ¦ Range of Motion : 2-125°, pain on full extension and flexion, patellofemoral crepitance, good patellofemoral glide \par ¦ Stability : no valgus or varus instability present on provocative testing\par ¦ Strength : flexion and extension 4+/5\par ¦ Tests and Signs : Anterior Drawer negative, Lachman negative, Evans's negative\par \par Left Lower Extremity\par o Buttock : no tenderness, swelling or deformities \par o Left Hip :\par ¦ Inspection/Palpation : no tenderness, no swelling or deformities\par ¦ Range of Motion : full and painless in all planes, no crepitance\par ¦ Stability : joint stability intact\par ¦ Strength : extension, flexion, adduction, abduction, internal rotation and external rotation, 4+/5\par \par o Left Knee :\par ¦ Inspection/Palpation : medial and mild lateral compartment tenderness, medial and lateral patellar facet tenderness, no swelling, mild varus deformity mild tenderness of patellofemoral \par ¦ Range of Motion : FROM, pain on full flexion, mild patellofemoral crepitance, good patellofemoral glide\par ¦ Stability : no valgus or varus instability present on provocative testing\par ¦ Strength : flexion and extension 4+/5\par ¦ Tests and Signs : Anterior Drawer negative, Lachman negative, Evans's negative\par \par o Peripheral Vascular System :\par ¦ Dorsalis Pedis Artery : pulse 2+ bilaterally\par ¦ Posterior Tibial Artery : pulse 2+ bilaterally \par \par Gait : antalgic gait, varus deformity on the right, milder deformity on the left, no significant extremity swelling or lymphedema, good proprioception and balance

## 2021-07-14 NOTE — HISTORY OF PRESENT ILLNESS
[de-identified] : 56 year old female patient is scheduled for a right total knee replacement on 7/20/21. She denies any back surgery, stents, heart attacks, or bladder issues. She denies any allergies to antibiotics, anti-inflammatories, aspirin, sulfa drugs, adhesive tape, or metal. She will be going home after the operation. She has seen her PCP (Dr. Garcia) and has obtained medical and cardiac clearance. All of her labs are normal. She is s/p COVID-19 in November. She has not had the vaccine. She is scheduled for a COVID-19 test on 7/19/21. Of note, She received a left knee Durolane injection on 6/24/21. \par \par Radiology Results taken on 6/24/21:\par o Right Knee : Standing AP, lateral and tunnel views of the knee were obtained and revealed severe medial and patellofemoral arthritis, THESE ARE TEMPLATED FILMS.\par \par Radiology Results from 5/13/21:\par o Left Knee : Standing AP, lateral and tunnel views of the knee were obtained and revealed moderate to severe medial compartment narrowing with severe patellofemoral arthritis.

## 2021-07-14 NOTE — ADDENDUM
[FreeTextEntry1] : I, Bhanu Harrison, acted solely as a scribe for Dr. Sergio Ruffin on this date 07/14/2021.\par All medical record entries made by the Scribe were at my, Dr. Sergio Ruffin, direction and personally dictated by me on 07/14/2021. I have reviewed the chart and agree that the record accurately reflects my personal performance of the history, physical exam, assessment and plan. I have also personally directed, reviewed, and agreed with the chart.

## 2021-07-19 ENCOUNTER — FORM ENCOUNTER (OUTPATIENT)
Age: 56
End: 2021-07-19

## 2021-07-19 ENCOUNTER — TRANSCRIPTION ENCOUNTER (OUTPATIENT)
Age: 56
End: 2021-07-19

## 2021-07-19 ENCOUNTER — OUTPATIENT (OUTPATIENT)
Dept: OUTPATIENT SERVICES | Facility: HOSPITAL | Age: 56
LOS: 1 days | End: 2021-07-19
Payer: COMMERCIAL

## 2021-07-19 DIAGNOSIS — Z98.890 OTHER SPECIFIED POSTPROCEDURAL STATES: Chronic | ICD-10-CM

## 2021-07-19 DIAGNOSIS — Z98.51 TUBAL LIGATION STATUS: Chronic | ICD-10-CM

## 2021-07-19 DIAGNOSIS — Z98.82 BREAST IMPLANT STATUS: Chronic | ICD-10-CM

## 2021-07-19 DIAGNOSIS — Z20.828 CONTACT WITH AND (SUSPECTED) EXPOSURE TO OTHER VIRAL COMMUNICABLE DISEASES: ICD-10-CM

## 2021-07-19 LAB — SARS-COV-2 RNA SPEC QL NAA+PROBE: SIGNIFICANT CHANGE UP

## 2021-07-19 PROCEDURE — U0005: CPT

## 2021-07-19 PROCEDURE — U0003: CPT

## 2021-07-20 ENCOUNTER — TRANSCRIPTION ENCOUNTER (OUTPATIENT)
Age: 56
End: 2021-07-20

## 2021-07-20 ENCOUNTER — APPOINTMENT (OUTPATIENT)
Dept: ORTHOPEDIC SURGERY | Facility: HOSPITAL | Age: 56
End: 2021-07-20

## 2021-07-20 ENCOUNTER — INPATIENT (INPATIENT)
Facility: HOSPITAL | Age: 56
LOS: 6 days | Discharge: HOME | DRG: 470 | End: 2021-07-27
Attending: SPECIALIST | Admitting: SPECIALIST
Payer: COMMERCIAL

## 2021-07-20 ENCOUNTER — RESULT REVIEW (OUTPATIENT)
Age: 56
End: 2021-07-20

## 2021-07-20 VITALS
TEMPERATURE: 98 F | HEART RATE: 74 BPM | SYSTOLIC BLOOD PRESSURE: 107 MMHG | WEIGHT: 186.29 LBS | HEIGHT: 66.5 IN | OXYGEN SATURATION: 97 % | RESPIRATION RATE: 14 BRPM | DIASTOLIC BLOOD PRESSURE: 79 MMHG

## 2021-07-20 DIAGNOSIS — Z98.82 BREAST IMPLANT STATUS: Chronic | ICD-10-CM

## 2021-07-20 DIAGNOSIS — Z98.51 TUBAL LIGATION STATUS: Chronic | ICD-10-CM

## 2021-07-20 DIAGNOSIS — Z98.890 OTHER SPECIFIED POSTPROCEDURAL STATES: Chronic | ICD-10-CM

## 2021-07-20 DIAGNOSIS — M17.11 UNILATERAL PRIMARY OSTEOARTHRITIS, RIGHT KNEE: ICD-10-CM

## 2021-07-20 LAB
ABO RH CONFIRMATION: SIGNIFICANT CHANGE UP
ANION GAP SERPL CALC-SCNC: 6 MMOL/L — SIGNIFICANT CHANGE UP (ref 5–17)
BUN SERPL-MCNC: 18 MG/DL — SIGNIFICANT CHANGE UP (ref 7–23)
CALCIUM SERPL-MCNC: 8.1 MG/DL — LOW (ref 8.4–10.5)
CHLORIDE SERPL-SCNC: 108 MMOL/L — SIGNIFICANT CHANGE UP (ref 96–108)
CO2 SERPL-SCNC: 24 MMOL/L — SIGNIFICANT CHANGE UP (ref 22–31)
CREAT SERPL-MCNC: 0.9 MG/DL — SIGNIFICANT CHANGE UP (ref 0.5–1.3)
GLUCOSE SERPL-MCNC: 133 MG/DL — HIGH (ref 70–99)
HCT VFR BLD CALC: 33.4 % — LOW (ref 34.5–45)
HGB BLD-MCNC: 11 G/DL — LOW (ref 11.5–15.5)
POTASSIUM SERPL-MCNC: 4.3 MMOL/L — SIGNIFICANT CHANGE UP (ref 3.5–5.3)
POTASSIUM SERPL-SCNC: 4.3 MMOL/L — SIGNIFICANT CHANGE UP (ref 3.5–5.3)
SODIUM SERPL-SCNC: 138 MMOL/L — SIGNIFICANT CHANGE UP (ref 135–145)

## 2021-07-20 PROCEDURE — 99222 1ST HOSP IP/OBS MODERATE 55: CPT

## 2021-07-20 PROCEDURE — 88311 DECALCIFY TISSUE: CPT | Mod: 26

## 2021-07-20 PROCEDURE — 73562 X-RAY EXAM OF KNEE 3: CPT | Mod: 26,RT

## 2021-07-20 PROCEDURE — 27447 TOTAL KNEE ARTHROPLASTY: CPT | Mod: RT

## 2021-07-20 PROCEDURE — 88305 TISSUE EXAM BY PATHOLOGIST: CPT | Mod: 26

## 2021-07-20 RX ORDER — OXYCODONE HYDROCHLORIDE 5 MG/1
10 TABLET ORAL
Refills: 0 | Status: DISCONTINUED | OUTPATIENT
Start: 2021-07-20 | End: 2021-07-21

## 2021-07-20 RX ORDER — HYDROMORPHONE HYDROCHLORIDE 2 MG/ML
0.5 INJECTION INTRAMUSCULAR; INTRAVENOUS; SUBCUTANEOUS
Refills: 0 | Status: DISCONTINUED | OUTPATIENT
Start: 2021-07-20 | End: 2021-07-20

## 2021-07-20 RX ORDER — TRANEXAMIC ACID 100 MG/ML
1000 INJECTION, SOLUTION INTRAVENOUS ONCE
Refills: 0 | Status: COMPLETED | OUTPATIENT
Start: 2021-07-20 | End: 2021-07-20

## 2021-07-20 RX ORDER — CEFAZOLIN SODIUM 1 G
2000 VIAL (EA) INJECTION ONCE
Refills: 0 | Status: COMPLETED | OUTPATIENT
Start: 2021-07-20 | End: 2021-07-20

## 2021-07-20 RX ORDER — CHLORHEXIDINE GLUCONATE 213 G/1000ML
1 SOLUTION TOPICAL ONCE
Refills: 0 | Status: COMPLETED | OUTPATIENT
Start: 2021-07-20 | End: 2021-07-20

## 2021-07-20 RX ORDER — APIXABAN 2.5 MG/1
1 TABLET, FILM COATED ORAL
Qty: 23 | Refills: 0
Start: 2021-07-20 | End: 2021-07-31

## 2021-07-20 RX ORDER — HYDROMORPHONE HYDROCHLORIDE 2 MG/ML
0.5 INJECTION INTRAMUSCULAR; INTRAVENOUS; SUBCUTANEOUS ONCE
Refills: 0 | Status: DISCONTINUED | OUTPATIENT
Start: 2021-07-20 | End: 2021-07-20

## 2021-07-20 RX ORDER — ACETAMINOPHEN 500 MG
1000 TABLET ORAL EVERY 6 HOURS
Refills: 0 | Status: COMPLETED | OUTPATIENT
Start: 2021-07-20 | End: 2021-07-20

## 2021-07-20 RX ORDER — ACETAMINOPHEN 500 MG
1000 TABLET ORAL ONCE
Refills: 0 | Status: COMPLETED | OUTPATIENT
Start: 2021-07-20 | End: 2021-07-20

## 2021-07-20 RX ORDER — VALSARTAN 80 MG/1
160 TABLET ORAL DAILY
Refills: 0 | Status: DISCONTINUED | OUTPATIENT
Start: 2021-07-22 | End: 2021-07-27

## 2021-07-20 RX ORDER — ACETAMINOPHEN 500 MG
1000 TABLET ORAL EVERY 8 HOURS
Refills: 0 | Status: DISCONTINUED | OUTPATIENT
Start: 2021-07-21 | End: 2021-07-21

## 2021-07-20 RX ORDER — HYDROMORPHONE HYDROCHLORIDE 2 MG/ML
0.5 INJECTION INTRAMUSCULAR; INTRAVENOUS; SUBCUTANEOUS EVERY 4 HOURS
Refills: 0 | Status: COMPLETED | OUTPATIENT
Start: 2021-07-20 | End: 2021-07-27

## 2021-07-20 RX ORDER — ONDANSETRON 8 MG/1
4 TABLET, FILM COATED ORAL ONCE
Refills: 0 | Status: DISCONTINUED | OUTPATIENT
Start: 2021-07-20 | End: 2021-07-20

## 2021-07-20 RX ORDER — METOPROLOL TARTRATE 50 MG
50 TABLET ORAL DAILY
Refills: 0 | Status: DISCONTINUED | OUTPATIENT
Start: 2021-07-20 | End: 2021-07-27

## 2021-07-20 RX ORDER — MAGNESIUM HYDROXIDE 400 MG/1
30 TABLET, CHEWABLE ORAL DAILY
Refills: 0 | Status: DISCONTINUED | OUTPATIENT
Start: 2021-07-20 | End: 2021-07-27

## 2021-07-20 RX ORDER — ASPIRIN/CALCIUM CARB/MAGNESIUM 324 MG
1 TABLET ORAL
Qty: 56 | Refills: 0
Start: 2021-07-20 | End: 2021-08-16

## 2021-07-20 RX ORDER — OMEPRAZOLE 10 MG/1
1 CAPSULE, DELAYED RELEASE ORAL
Qty: 30 | Refills: 1
Start: 2021-07-20 | End: 2021-09-17

## 2021-07-20 RX ORDER — SODIUM CHLORIDE 9 MG/ML
500 INJECTION INTRAMUSCULAR; INTRAVENOUS; SUBCUTANEOUS ONCE
Refills: 0 | Status: COMPLETED | OUTPATIENT
Start: 2021-07-20 | End: 2021-07-20

## 2021-07-20 RX ORDER — SODIUM CHLORIDE 9 MG/ML
1000 INJECTION, SOLUTION INTRAVENOUS
Refills: 0 | Status: DISCONTINUED | OUTPATIENT
Start: 2021-07-20 | End: 2021-07-20

## 2021-07-20 RX ORDER — ACETAMINOPHEN 500 MG
1000 TABLET ORAL EVERY 6 HOURS
Refills: 0 | Status: COMPLETED | OUTPATIENT
Start: 2021-07-21 | End: 2021-07-21

## 2021-07-20 RX ORDER — APREPITANT 80 MG/1
40 CAPSULE ORAL ONCE
Refills: 0 | Status: COMPLETED | OUTPATIENT
Start: 2021-07-20 | End: 2021-07-20

## 2021-07-20 RX ORDER — CEFAZOLIN SODIUM 1 G
2000 VIAL (EA) INJECTION EVERY 8 HOURS
Refills: 0 | Status: COMPLETED | OUTPATIENT
Start: 2021-07-20 | End: 2021-07-21

## 2021-07-20 RX ORDER — APIXABAN 2.5 MG/1
2.5 TABLET, FILM COATED ORAL EVERY 12 HOURS
Refills: 0 | Status: DISCONTINUED | OUTPATIENT
Start: 2021-07-22 | End: 2021-07-27

## 2021-07-20 RX ORDER — SENNA PLUS 8.6 MG/1
2 TABLET ORAL AT BEDTIME
Refills: 0 | Status: DISCONTINUED | OUTPATIENT
Start: 2021-07-20 | End: 2021-07-27

## 2021-07-20 RX ORDER — SODIUM CHLORIDE 9 MG/ML
1000 INJECTION, SOLUTION INTRAVENOUS
Refills: 0 | Status: DISCONTINUED | OUTPATIENT
Start: 2021-07-20 | End: 2021-07-21

## 2021-07-20 RX ORDER — CELECOXIB 200 MG/1
200 CAPSULE ORAL EVERY 12 HOURS
Refills: 0 | Status: DISCONTINUED | OUTPATIENT
Start: 2021-07-20 | End: 2021-07-27

## 2021-07-20 RX ORDER — HYDROMORPHONE HYDROCHLORIDE 2 MG/ML
1 INJECTION INTRAMUSCULAR; INTRAVENOUS; SUBCUTANEOUS
Refills: 0 | Status: DISCONTINUED | OUTPATIENT
Start: 2021-07-20 | End: 2021-07-20

## 2021-07-20 RX ORDER — ATORVASTATIN CALCIUM 80 MG/1
10 TABLET, FILM COATED ORAL AT BEDTIME
Refills: 0 | Status: DISCONTINUED | OUTPATIENT
Start: 2021-07-20 | End: 2021-07-27

## 2021-07-20 RX ORDER — OXYCODONE HYDROCHLORIDE 5 MG/1
5 TABLET ORAL
Refills: 0 | Status: DISCONTINUED | OUTPATIENT
Start: 2021-07-20 | End: 2021-07-21

## 2021-07-20 RX ORDER — POLYETHYLENE GLYCOL 3350 17 G/17G
17 POWDER, FOR SOLUTION ORAL AT BEDTIME
Refills: 0 | Status: DISCONTINUED | OUTPATIENT
Start: 2021-07-20 | End: 2021-07-27

## 2021-07-20 RX ORDER — LEVOTHYROXINE SODIUM 125 MCG
50 TABLET ORAL DAILY
Refills: 0 | Status: DISCONTINUED | OUTPATIENT
Start: 2021-07-20 | End: 2021-07-27

## 2021-07-20 RX ORDER — CELECOXIB 200 MG/1
1 CAPSULE ORAL
Qty: 60 | Refills: 0
Start: 2021-07-20 | End: 2021-08-18

## 2021-07-20 RX ORDER — PANTOPRAZOLE SODIUM 20 MG/1
40 TABLET, DELAYED RELEASE ORAL
Refills: 0 | Status: DISCONTINUED | OUTPATIENT
Start: 2021-07-20 | End: 2021-07-27

## 2021-07-20 RX ORDER — AMLODIPINE BESYLATE 2.5 MG/1
5 TABLET ORAL DAILY
Refills: 0 | Status: DISCONTINUED | OUTPATIENT
Start: 2021-07-22 | End: 2021-07-27

## 2021-07-20 RX ORDER — APIXABAN 2.5 MG/1
2.5 TABLET, FILM COATED ORAL
Refills: 0 | Status: COMPLETED | OUTPATIENT
Start: 2021-07-21 | End: 2021-07-21

## 2021-07-20 RX ADMIN — Medication 400 MILLIGRAM(S): at 18:26

## 2021-07-20 RX ADMIN — Medication 100 MILLIGRAM(S): at 20:10

## 2021-07-20 RX ADMIN — OXYCODONE HYDROCHLORIDE 10 MILLIGRAM(S): 5 TABLET ORAL at 23:45

## 2021-07-20 RX ADMIN — SODIUM CHLORIDE 500 MILLILITER(S): 9 INJECTION INTRAMUSCULAR; INTRAVENOUS; SUBCUTANEOUS at 15:39

## 2021-07-20 RX ADMIN — OXYCODONE HYDROCHLORIDE 10 MILLIGRAM(S): 5 TABLET ORAL at 23:06

## 2021-07-20 RX ADMIN — HYDROMORPHONE HYDROCHLORIDE 0.5 MILLIGRAM(S): 2 INJECTION INTRAMUSCULAR; INTRAVENOUS; SUBCUTANEOUS at 15:38

## 2021-07-20 RX ADMIN — HYDROMORPHONE HYDROCHLORIDE 0.5 MILLIGRAM(S): 2 INJECTION INTRAMUSCULAR; INTRAVENOUS; SUBCUTANEOUS at 20:07

## 2021-07-20 RX ADMIN — OXYCODONE HYDROCHLORIDE 5 MILLIGRAM(S): 5 TABLET ORAL at 18:26

## 2021-07-20 RX ADMIN — SODIUM CHLORIDE 500 MILLILITER(S): 9 INJECTION INTRAMUSCULAR; INTRAVENOUS; SUBCUTANEOUS at 18:58

## 2021-07-20 RX ADMIN — HYDROMORPHONE HYDROCHLORIDE 0.5 MILLIGRAM(S): 2 INJECTION INTRAMUSCULAR; INTRAVENOUS; SUBCUTANEOUS at 20:13

## 2021-07-20 RX ADMIN — APREPITANT 40 MILLIGRAM(S): 80 CAPSULE ORAL at 10:32

## 2021-07-20 RX ADMIN — CHLORHEXIDINE GLUCONATE 1 APPLICATION(S): 213 SOLUTION TOPICAL at 10:31

## 2021-07-20 RX ADMIN — OXYCODONE HYDROCHLORIDE 5 MILLIGRAM(S): 5 TABLET ORAL at 00:00

## 2021-07-20 RX ADMIN — SODIUM CHLORIDE 100 MILLILITER(S): 9 INJECTION, SOLUTION INTRAVENOUS at 15:39

## 2021-07-20 RX ADMIN — SENNA PLUS 2 TABLET(S): 8.6 TABLET ORAL at 21:26

## 2021-07-20 RX ADMIN — CELECOXIB 200 MILLIGRAM(S): 200 CAPSULE ORAL at 22:23

## 2021-07-20 RX ADMIN — CELECOXIB 200 MILLIGRAM(S): 200 CAPSULE ORAL at 21:25

## 2021-07-20 RX ADMIN — SODIUM CHLORIDE 100 MILLILITER(S): 9 INJECTION, SOLUTION INTRAVENOUS at 18:26

## 2021-07-20 RX ADMIN — HYDROMORPHONE HYDROCHLORIDE 0.5 MILLIGRAM(S): 2 INJECTION INTRAMUSCULAR; INTRAVENOUS; SUBCUTANEOUS at 14:36

## 2021-07-20 RX ADMIN — Medication 1000 MILLIGRAM(S): at 18:49

## 2021-07-20 NOTE — CONSULT NOTE ADULT - ASSESSMENT
55 yo f pmh hypothyroidism, hld, htn, anxiety/depression, gerd is s/p Right TKR    #S/P Right TKR  Post op orders per ortho  Pain management  Bowl regimen  Pt eval  Obtain baseline labs    #HLD  continue statins    #HTN  hold diuretics    #Hypothyroidism  levothyroxine    #Anxiety/Depression  continue home med    #GERD  PPI    #DVT proph  per ortho

## 2021-07-20 NOTE — PHYSICAL THERAPY INITIAL EVALUATION ADULT - ASSISTIVE DEVICE FOR TRANSFER: GAIT, REHAB EVAL
PT orders received, chart reviewed. Pt unable to participate with PT due to:  []  Nausea/vomiting  []  Eating  []  Pain  []  Pt lethargic  [x]  Off Unit at echo  []  Pt refused  []  Other, pt needs CAM boot for PT. Informed nursing and desk  for CAM boot. Will f/u later as patient's schedule allows.  Thank you for this referral.  Elise Meyer, PT, DPT rolling walker

## 2021-07-20 NOTE — CONSULT NOTE ADULT - REASON FOR ADMISSION
R TKR
[id number]  *More detailed information regarding your visit and discharge can be found by reviewing this packet.*  -----------------------------    Your diagnosis this visit was: asymptomatic hypertension     From this ED visit you were prescribed: no new Rx. You should  your Rx for Hydralazine from your pharmacy and take as directed in addition to your other Rx'd blood pressure meds.     You may be contacted by our Emergency Department Referrals Coordinator to set up your follow-up appointment within 24-48 hours of your discharge, Monday through Friday. We recommend you follow up with: your primary care doctor.     Please return to the Emergency Department if you experience any of the following symptoms:  - Shortness of breath or trouble breathing  - Pressure, pain, or tightness in the chest  - Face drooping, arm weakness or speech difficulty,  - Persistent or severe vomiting  - Head injury, severe or sudden headache or loss of consciousness    Diet changes/restrictions   [ ] Diet Restriction as follows: low sodium diet

## 2021-07-20 NOTE — DISCHARGE NOTE PROVIDER - NSDCMRMEDTOKEN_GEN_ALL_CORE_FT
amlodipine-valsartan 5 mg-160 mg oral tablet: 1 tab(s) orally once a day  apixaban 2.5 mg oral tablet: 1 tab(s) orally every 12 hours. Switch to Aspirin once Eliquis is completed.   atorvastatin 10 mg oral tablet: 1 tab(s) orally once a day  CeleBREX 200 mg oral capsule: 1 cap(s) orally every 12 hours. Take at least 2 hours after Aspirin.   celecoxib 200 mg oral capsule: orally once a day  Dupixent 300 mg/2 mL subcutaneous solution: twice a month   Ecotrin Adult Low Strength 81 mg oral delayed release tablet: 1 tab(s) orally every 12 hours. Take at least 2 hours before Celebrex.   metoprolol succinate 50 mg oral tablet, extended release: 1 tab(s) orally once a day  omeprazole 20 mg oral delayed release tablet: 1 tab(s) orally once a day   pantoprazole 40 mg oral delayed release tablet: 1 tab(s) orally once a day  pantoprazole 40 mg oral delayed release tablet: 1 tab(s) orally once a day  Synthroid 50 mcg (0.05 mg) oral tablet: 1 tab(s) orally once a day  Trintellix 20 mg oral tablet: 1 tab(s) orally once a day   3-1 Commode: s/p Right TKR  acetaminophen 500 mg oral tablet: 1 tab(s) orally every 6 hours  amlodipine-valsartan 5 mg-160 mg oral tablet: 1 tab(s) orally once a day  apixaban 2.5 mg oral tablet: 1 tab(s) orally every 12 hours. Switch to Aspirin once Eliquis is completed.   atorvastatin 10 mg oral tablet: 1 tab(s) orally once a day  CeleBREX 200 mg oral capsule: 1 cap(s) orally every 12 hours. Take at least 2 hours after Aspirin.   Ecotrin Adult Low Strength 81 mg oral delayed release tablet: 1 tab(s) orally every 12 hours. Take at least 2 hours before Celebrex.   metoprolol succinate 50 mg oral tablet, extended release: 1 tab(s) orally once a day  oxyCODONE 20 mg oral tablet, extended release: 1 tab(s) orally every 8 hours MDD:3  oxyCODONE 5 mg oral tablet: 1-2 tab(s) orally every 4 hours, As Needed -for severe pain MDD:6  pantoprazole 40 mg oral delayed release tablet: 1 tab(s) orally once a day  polyethylene glycol 3350 oral powder for reconstitution: 17 gram(s) orally once a day (at bedtime)  rolling walker with 5 in wheels: Dx: Right TKR  senna oral tablet: 2 tab(s) orally once a day (at bedtime)  Synthroid 50 mcg (0.05 mg) oral tablet: 1 tab(s) orally once a day  Trintellix 20 mg oral tablet: 1 tab(s) orally once a day   3-1 Commode: s/p Right TKR  acetaminophen 500 mg oral tablet: 1 tab(s) orally every 6 hours  amlodipine-valsartan 5 mg-160 mg oral tablet: 1 tab(s) orally once a day  apixaban 2.5 mg oral tablet: 1 tab(s) orally every 12 hours stop on 8/1  atorvastatin 10 mg oral tablet: 1 tab(s) orally once a day  metoprolol succinate 50 mg oral tablet, extended release: 1 tab(s) orally once a day  oxyCODONE 10 mg oral tablet: 1 tab(s) orally every 4 hours  oxyCODONE 5 mg oral tablet: 1 tab(s) orally every 4 hours  OxyCONTIN 20 mg oral tablet, extended release: 1 tab(s) orally every 8 hours  pantoprazole 40 mg oral delayed release tablet: 1 tab(s) orally once a day  polyethylene glycol 3350 oral powder for reconstitution: 17 gram(s) orally once a day (at bedtime)  rolling walker with 5 in wheels: Dx: Right TKR  senna oral tablet: 2 tab(s) orally once a day (at bedtime)  Synthroid 50 mcg (0.05 mg) oral tablet: 1 tab(s) orally once a day  Trintellix 20 mg oral tablet: 1 tab(s) orally once a day

## 2021-07-20 NOTE — DISCHARGE NOTE PROVIDER - HOSPITAL COURSE
This patient was admitted to [facility] with a history of severe degenerative joint disease of the right knee.  Patient went to Pre-Surgical Testing at [facility] and was medically cleared to undergo elective procedure. Patient underwent right total knee replacement by Dr. Ruffin on 7/20/21. Procedure was well tolerated.  No operative or safia-operative complications arose during patients hospital course.  Patient received antibiotic according to SCIP guidelines for infection prevention.  Eliquis was given for DVT prophylaxis.  Anesthesia, Medical Hospitalist, Physical Therapy and Occupational Therapy were consulted. Patient is stable for discharge with a good prognosis.  Appropriate discharge instructions and medications are provided in this document.

## 2021-07-20 NOTE — DISCHARGE NOTE PROVIDER - CARE PROVIDER_API CALL
Sergio Ruffin)  Orthopaedic Surgery  [address], [address]  [place] [address]  Phone: [phone]  Fax: [phone]  Scheduled Appointment: 08/04/2021 10:45 AM

## 2021-07-20 NOTE — PROGRESS NOTE ADULT - SUBJECTIVE AND OBJECTIVE BOX
After risks, benefits, alternatives to nerve block explained, informed consent obtained.  Patient prepped with sterile solution, using US guidance adductor canal visualized and 20ml of 0.5% ropivicaine given after negative aspiration for every 5ml of LA.  Images obtained and placed in chart.  Patient tolerated procedure without incident.

## 2021-07-20 NOTE — PHYSICAL THERAPY INITIAL EVALUATION ADULT - ADDITIONAL COMMENTS
Pt lives with  and 17 year-old twins in private home with 5 GLADYS with handrail and no steps inside. Kids and  will be available to assist pt as needed. Pt was independent in all activity prior to surgery.      Pt needs RW.

## 2021-07-20 NOTE — CONSULT NOTE ADULT - SUBJECTIVE AND OBJECTIVE BOX
Patient is a 56y old  Female who presents with a chief complaint of right total knee replacement for right knee osteoarthritis (2021 15:12)      HPI:    55 yo f pmh hypothyroidism, hld, htn, anxiety/depression, gerd is s/p Right TKR, prior to the procedure.  She reported right knee pain with swelling for many years which has recently increased in intensity.  She had tried HA injections without relief.  Pain rated 10/10 at worst at night after weight bearing and ascending stairs.     REVIEW OF SYSTEMS:  CONSTITUTIONAL: No fever, weight loss, or fatigue  RESPIRATORY: No cough, wheezing, chills or hemoptysis; No shortness of breath  CARDIOVASCULAR: No chest pain, palpitations, dizziness, or leg swelling  GASTROINTESTINAL: No abdominal or epigastric pain. No nausea, vomiting, or hematemesis; No diarrhea or constipation. No melena or hematochezia.  GENITOURINARY: No dysuria, frequency, hematuria, or incontinence  NEUROLOGICAL: No headaches, memory loss, loss of strength, numbness, or tremors  MUSCULOSKELETAL: No muscle or back pain  PSYCHIATRIC: No depression, anxiety, mood swings, or difficulty sleeping      PAST MEDICAL & SURGICAL HISTORY:  Hypothyroidism    Hypercholesterolemia    Hypertension    Anxiety and depression    GERD (gastroesophageal reflux disease)    Eczema  hands and feet    H/O connective tissue disease    Neutrophilic dermatosis    Osteoarthritis of right knee    History of 2019 novel coronavirus disease (COVID-19)  2020; no complication    Gluten intolerance    Fibromyalgia    H/O tubal ligation       H/O lumpectomy  , left    S/P surgical removal of pilonidal cyst      H/O breast implant  ,  exchanged    Status post right foot surgery  2019        SOCIAL HISTORY:  Deniest Tobacco  Denies Etoh  Denies Drugs      Allergies    Gluten (Other)  No Known Drug Allergies    Intolerances        MEDICATIONS  (STANDING):  acetaminophen  IVPB .. 1000 milliGRAM(s) IV Intermittent every 6 hours  ceFAZolin   IVPB 2000 milliGRAM(s) IV Intermittent every 8 hours  lactated ringers. 1000 milliLiter(s) (75 mL/Hr) IV Continuous <Continuous>  lactated ringers. 1000 milliLiter(s) (100 mL/Hr) IV Continuous <Continuous>    MEDICATIONS  (PRN):  HYDROmorphone  Injectable 0.5 milliGRAM(s) IV Push every 10 minutes PRN Moderate Pain (4 - 6)  HYDROmorphone  Injectable 1 milliGRAM(s) IV Push every 10 minutes PRN Severe Pain (7 - 10)  ondansetron Injectable 4 milliGRAM(s) IV Push once PRN Nausea and/or Vomiting      FAMILY HISTORY:  FH: brain aneurysm  Mother-     FH: hypertension  Mother-  and younger sister-Alive        Vital Signs Last 24 Hrs  T(C): 36.7 (2021 10:12), Max: 36.7 (2021 10:12)  T(F): 98.1 (2021 10:12), Max: 98.1 (2021 10:12)  HR: 68 (2021 16:02) (60 - 76)  BP: 90/63 (2021 16:02) (87/62 - 112/76)  BP(mean): --  RR: 13 (2021 16:02) (13 - 21)  SpO2: 93% (2021 16:02) (93% - 98%)    PHYSICAL EXAM:    GENERAL: NAD, well-groomed, well-developed  HEAD:  Atraumatic, Normocephalic  EYES: EOMI, PERRLA, conjunctiva and sclera clear  ENMT: No tonsillar erythema, exudates, or enlargement; Moist mucous membranes, Good dentition, No lesions  NECK: Supple, No JVD, Normal thyroid  NERVOUS SYSTEM:  Alert & Oriented X3, Good concentration; Moving all 4 extremities; No gross sensory deficits  CHEST/LUNG: Clear to auscultation bilaterally; No rales, rhonchi, wheezing, or rubs  HEART: Regular rate and rhythm; No murmurs, rubs, or gallops  ABDOMEN: Soft, Nontender, Nondistended; Bowel sounds present  EXTREMITIES:  2+ Peripheral Pulses, No clubbing, cyanosis, or edema  SKIN: No rashes or lesions  INCISION: intact    LABS:              CAPILLARY BLOOD GLUCOSE          RADIOLOGY & ADDITIONAL STUDIES:    EKG:

## 2021-07-20 NOTE — DISCHARGE NOTE PROVIDER - NSDCCPTREATMENT_GEN_ALL_CORE_FT
DASH Diet: Care Instructions  Your Care Instructions    The DASH diet is an eating plan that can help lower your blood pressure. DASH stands for Dietary Approaches to Stop Hypertension. Hypertension is high blood pressure. The DASH diet focuses on eating foods that are high in calcium, potassium, and magnesium. These nutrients can lower blood pressure. The foods that are highest in these nutrients are fruits, vegetables, low-fat dairy products, nuts, seeds, and legumes. But taking calcium, potassium, and magnesium supplements instead of eating foods that are high in those nutrients does not have the same effect. The DASH diet also includes whole grains, fish, and poultry. The DASH diet is one of several lifestyle changes your doctor may recommend to lower your high blood pressure. Your doctor may also want you to decrease the amount of sodium in your diet. Lowering sodium while following the DASH diet can lower blood pressure even further than just the DASH diet alone. Follow-up care is a key part of your treatment and safety. Be sure to make and go to all appointments, and call your doctor if you are having problems. It's also a good idea to know your test results and keep a list of the medicines you take. How can you care for yourself at home? Following the DASH diet  · Eat 4 to 5 servings of fruit each day. A serving is 1 medium-sized piece of fruit, ½ cup chopped or canned fruit, 1/4 cup dried fruit, or 4 ounces (½ cup) of fruit juice. Choose fruit more often than fruit juice. · Eat 4 to 5 servings of vegetables each day. A serving is 1 cup of lettuce or raw leafy vegetables, ½ cup of chopped or cooked vegetables, or 4 ounces (½ cup) of vegetable juice. Choose vegetables more often than vegetable juice. · Get 2 to 3 servings of low-fat and fat-free dairy each day. A serving is 8 ounces of milk, 1 cup of yogurt, or 1 ½ ounces of cheese. · Eat 6 to 8 servings of grains each day.  A serving is 1 slice PRINCIPAL PROCEDURE  Procedure: Right total knee replacement  Findings and Treatment: For your total knee replacement:  Physical Therapy/Occupational Therapy for: ambulation, transfers, stairs, ADL's (activities of daily living), range of motion exercises, and isometrics  -Activity  • Weight Bearing as tolerated with rolling walker.  • Take short, frequent walks increasing the distance that you walk each day as tolerated.  • Change your position every hour to decrease pain and stiffness.  • Continue the exercises taught to you by your physical therapist.  • No driving until cleared by the doctor.  • No tub baths, hot tubs, or swimming pools until instructed by your doctor.  • Do not squat down on the floor.  • Do not kneel or twist your knee.  • Range of Motion Goals: Flexion= 120 degrees, Extension = 0 degrees  Keep incision clean and dry. Dressing may be removed and incision megan be left open to air on post-op day 2 if no incisional drainage. If drainage is present, perform daily dry dressing changes until dry. May shower 2 days after surgery if no drainage from incision. Do not soak, scrub, or submerge incision in water.  Follow up with surgeon in the office for Prineo tape removal.       of bread, 1 ounce of dry cereal, or ½ cup of cooked rice, pasta, or cooked cereal. Try to choose whole-grain products as much as possible. · Limit lean meat, poultry, and fish to 2 servings each day. A serving is 3 ounces, about the size of a deck of cards. · Eat 4 to 5 servings of nuts, seeds, and legumes (cooked dried beans, lentils, and split peas) each week. A serving is 1/3 cup of nuts, 2 tablespoons of seeds, or ½ cup of cooked beans or peas. · Limit fats and oils to 2 to 3 servings each day. A serving is 1 teaspoon of vegetable oil or 2 tablespoons of salad dressing. · Limit sweets and added sugars to 5 servings or less a week. A serving is 1 tablespoon jelly or jam, ½ cup sorbet, or 1 cup of lemonade. · Eat less than 2,300 milligrams (mg) of sodium a day. If you limit your sodium to 1,500 mg a day, you can lower your blood pressure even more. Tips for success  · Start small. Do not try to make dramatic changes to your diet all at once. You might feel that you are missing out on your favorite foods and then be more likely to not follow the plan. Make small changes, and stick with them. Once those changes become habit, add a few more changes. · Try some of the following:  ¨ Make it a goal to eat a fruit or vegetable at every meal and at snacks. This will make it easy to get the recommended amount of fruits and vegetables each day. ¨ Try yogurt topped with fruit and nuts for a snack or healthy dessert. ¨ Add lettuce, tomato, cucumber, and onion to sandwiches. ¨ Combine a ready-made pizza crust with low-fat mozzarella cheese and lots of vegetable toppings. Try using tomatoes, squash, spinach, broccoli, carrots, cauliflower, and onions. ¨ Have a variety of cut-up vegetables with a low-fat dip as an appetizer instead of chips and dip. ¨ Sprinkle sunflower seeds or chopped almonds over salads. Or try adding chopped walnuts or almonds to cooked vegetables.   ¨ Try some vegetarian meals using beans and peas. Add garbanzo or kidney beans to salads. Make burritos and tacos with mashed corona beans or black beans. Where can you learn more? Go to http://ambrose-vince.info/. Enter G700 in the search box to learn more about \"DASH Diet: Care Instructions. \"  Current as of: December 6, 2017  Content Version: 11.7  © 9748-5298 [employer]. Care instructions adapted under license by [employer] (which disclaims liability or warranty for this information). If you have questions about a medical condition or this instruction, always ask your healthcare professional. [address] any warranty or liability for your use of this information.

## 2021-07-20 NOTE — DISCHARGE NOTE PROVIDER - NSDCCPCAREPLAN_GEN_ALL_CORE_FT
PRINCIPAL DISCHARGE DIAGNOSIS  Diagnosis: Osteoarthritis of right knee  Assessment and Plan of Treatment: s/p right total knee replacement surgery

## 2021-07-20 NOTE — PRE-OP CHECKLIST - NOTHING BY MOUTH SINCE
Established Patient    Mahamed presents today with the following:    CC: Hardening of veins, pain    HPI: Mr. Mae is a pleasant 32 year old male with PMHx significant for depression, suicidal ideation, hepatitis C, substance use disorder presents to the clinic to for follow up visit after recent ER visit.    History was obtained from the patient and a medical record review.     He was in the ER yesterday with a one-week history of pain in both his forearms with hardening of his veins. He has been clean for about 6 months now. In the ER, imaging was done and he was found to have chronic superficial thrombus phlebitis with possible clot in the superficial vein. He was discharged with naproxen 500 mg twice a day.    He is currently living in [place] which is an inpatient rehab center for substance abuse and reports to be living there for the past 2 months. He has recently been incarcerated and released from [facility]. He reports that he is doing fine and has been sober for the past 6 months. He has previously used heroin, IV meth, marijuana and also has a 20 pack year smoking history. He reports he quit and he has no intention of going back on them.    He also reports being diagnosed with hepatitis C about 2 years ago. He is genotype 1A and recent HCV RNA PCR. He reports never being treated for hepatitis C. He is seeing Dr. Landis for it.    He also reports severe history of depression and is currently on abilify. His PHQ9 score is 14 but he reports that he is going to establish with a psychiatrist on May 9th. He currently denies suicidal and homicidal ideation.      Patient Active Problem List    Diagnosis Date Noted   • Seizures (HCC) 04/18/2018   • Depression 04/18/2018   • Hepatitis C 04/18/2018   • Bipolar 1 disorder (HCC) 04/18/2018   • Substance use disorder 04/18/2018       Current Outpatient Prescriptions   Medication Sig Dispense Refill   • naproxen (NAPROSYN) 500 MG Tab Take 1 Tab by mouth 2 times a day,  "with meals. 20 Tab 0   • glecaprevir-pibrentasvir 100-40 mg tablet Take 3 Tabs by mouth every day at 6 PM. 84 Tab 1   • Sertraline HCl (ZOLOFT PO) Take  by mouth.     • levETIRAcetam (KEPPRA) 500 MG Tab Take 2 Tabs by mouth 2 times a day. 60 Tab 0   • QUEtiapine (SEROQUEL) 100 MG Tab Take 1 Tab by mouth every bedtime. 20 Tab 0   • aripiprazole (ABILIFY) 30 MG tablet Take 1 Tab by mouth every day. 30 Tab 1     No current facility-administered medications for this visit.        Social History     Social History   • Marital status: Single     Spouse name: N/A   • Number of children: N/A   • Years of education: N/A     Occupational History   • Not on file.     Social History Main Topics   • Smoking status: Former Smoker     Packs/day: 1.00     Years: 20.00     Types: Cigarettes     Quit date: 1/25/2018   • Smokeless tobacco: Former User     Types: Chew     Quit date: 1/25/2018   • Alcohol use No   • Drug use: No      Comment: Quit in December 2017, Used heroine and meth (IV)   • Sexual activity: Not on file     Other Topics Concern   • Not on file     Social History Narrative   • No narrative on file       Family History   Problem Relation Age of Onset   • Hypertension Mother    • Alcohol/Drug Father    • Diabetes Neg Hx    • Heart Disease Neg Hx    • Cancer Neg Hx    • Stroke Neg Hx        ROS: As per HPI. Additional pertinent symptoms as noted below      /70   Pulse (!) 58   Temp 37 °C (98.6 °F)   Ht 1.676 m (5' 6\")   Wt 86.2 kg (190 lb)   SpO2 96%   BMI 30.67 kg/m²     Physical Exam  General:  Alert and oriented, No apparent distress.    Eyes: Pupils equal and reactive. No scleral icterus.    Throat: Clear no erythema or exudates noted.    Neck: Supple. No lymphadenopathy noted. Thyroid not enlarged.    Lungs: Clear to auscultation and percussion bilaterally.    Cardiovascular: Regular rate and rhythm. No murmurs, rubs or gallops.    Abdomen:  Benign. No rebound or guarding noted.    Extremities: No " clubbing, cyanosis, edema.    Skin: Clear. No rash or suspicious skin lesions noted. Hardening of veins noted on bilateral upper extremities. No erythema or signs of infection seen.        Note: I have reviewed all pertinent labs and diagnostic tests associated with this visit with specific comments listed under the assessment and plan below      Assessment and Plan    1. Superficial thrombophlebitis of both upper extremities  Patient appears to have chronic superficial thrombus phlebitis per imaging.  The take naproxen 500 mg twice a day. Advised only short-term due to effects on liver.  Advised hot compresses.  Supportive care only for now.  No signs of infection, but warned patient about signs and symptoms and advised to call clinic or come back if he notices them.    2. Hepatitis C virus infection without hepatic coma, unspecified chronicity  HCV genotype 1A  RNA PCR on file.  Following Dr. Landis and undergoing treatment.    3. Seizures (CMS-HCC)  Continue Keppra 1000mg BID.  Reports that he does not drive.  Upcoming followup with neurologist in August.    4. Depression, unspecified depression type  Denies suicidal or homicidal ideation.  Continue Abilify.  Is going to establish with [facility] next month.    5. Bipolar 1 disorder (CMS-HCC)  Self reported history.  Recommend following with psychiatry.    6. Substance use disorder  Strongly counseled patient about ill effects of use.  Encouraged patient to stay off substances.  Patient is upbeat about being off and is positive he will remain sober.    7. Healthcare maintenance  Recommend getting Tdap vaccine.  Up to date on other vaccines.      Followup: Return in about 3 months (around 9/26/2018), or if symptoms worsen or fail to improve, for Long.      Signed by: Raymond Avalos M.D.     19-Jul-2021 22:00

## 2021-07-20 NOTE — PRE-OP CHECKLIST - WAS PATIENT ON BETA BLOCKER?
Called patient's mother to schedule referred appointment from [facility] ER with Dr. Arguello for treatment of a right clavicle fracture. Appointment made and patient's mother voiced understanding of appointment date, time, and location.        Yes

## 2021-07-20 NOTE — BRIEF OPERATIVE NOTE - CONDITION POST OP
"  History     Chief Complaint   Patient presents with     Post-op Problem     HPI  Alissa Escoto is a 58 year old female with a history of right knee arthroplasty (07/05/17), by Dr. Burnett at [facility], who presents for evaluation of multiple blisters on her right knee. Patient reports immediately following her TKA the area around her incision was \"beet red\", but she was discharged on 07/10/17 with no post-operative issues. However, on 07/11/17 she had the onset of one blister on the right knee distal and medial to the end of her incision. She followed up with her primary care provider regarding this blister which was cultured and reportedly returned positive for Staph and negative for MRSA, so patient was started on 900 mg daily of clindamycin. Over the past five weeks she has had multiple bullae develop and pop on her right knee around her incision with itchy erythematous skin around the right knee as well (see images below for full detail). Additionally, she complains of right calf ankle swelling that has developed over the past few weeks and is worse since the time of the surgery.     She denies fever, chills, or myalgias. No abdominal pain, nausea, vomiting, or changes in bowel movements. She denies significant right knee pain, and reports if she did not have these blisters she would have full range of motion of her knee by now, but because of the blisters she is only able to extend 90%. Patient is not allowed to take steroids. She did take oral anti-histamines as well as anti-histamine ointment over the area where her blisters have been developing which she states actually made the itching worse and did not improve the blisters. She does have an appointment scheduled with Dermatology on September 6th.     No other symptoms or complaints at this time. Please see ROS for further details.    I have reviewed the Medications, Allergies, Past Medical and Surgical History, and Social History in the Epic " "system.  Pertinent PMH/PSH: Previous bilateral knee surgeries, most recently the right knee.   No family history on file.    Social History   Substance Use Topics     Smoking status: Never Smoker     Smokeless tobacco: Not on file     Alcohol use No       Current Facility-Administered Medications   Medication     potassium chloride (KLOR-CON) Packet 20 mEq     Current Outpatient Prescriptions   Medication     CLINDAMYCIN HCL PO     LEVOTHYROXINE SODIUM PO     SIMVASTATIN PO        Allergies   Allergen Reactions     Sulfa Drugs Anaphylaxis     Betadine [Povidone Iodine] Rash     Blisters     Iodine Rash       Review of Systems   Constitutional: Negative for chills, diaphoresis and fever.   HENT: Negative for ear pain, sore throat, tinnitus, trouble swallowing and voice change.    Eyes: Negative for pain and visual disturbance.   Respiratory: Negative for cough and shortness of breath.    Cardiovascular: Negative for chest pain, palpitations and leg swelling.   Gastrointestinal: Negative for abdominal pain, blood in stool, constipation, diarrhea, nausea and vomiting.   Endocrine: Negative for polydipsia and polyuria.   Genitourinary: Negative for dysuria, frequency, hematuria and urgency.   Musculoskeletal: Positive for myalgias (RLE only). Negative for arthralgias (knee itself feels good), back pain and neck pain.   Skin: Positive for color change and rash. Negative for pallor.        See HPI   Allergic/Immunologic: Negative for immunocompromised state.   Neurological: Negative for dizziness, weakness, light-headedness, numbness and headaches.   Hematological: Negative for adenopathy. Does not bruise/bleed easily.   Psychiatric/Behavioral: Negative for dysphoric mood. The patient is not nervous/anxious.        Physical Exam   BP: (!) 141/91  Heart Rate: 89  Temp: 98.2  F (36.8  C)  Resp: 14  Height: 172.7 cm (5' 8\")  Weight: 104.3 kg (230 lb)  SpO2: 94 %  Physical Exam  CONSTITUTIONAL: Well-developed and " well-nourished. Awake and alert. Non-toxic appearance. No acute distress.   HENT:   - Head: Normocephalic and atraumatic.   - Ears: Hearing and external ear grossly normal.   - Nose: Nose normal. No rhinorrhea. No epistaxis.   - Mouth/Throat: Oropharynx is clear and MMM  EYES: Conjunctivae and lids are normal. No scleral icterus.   NECK: Normal range of motion and phonation normal. Neck supple.  No tracheal deviation, no stridor. No edema or erythema noted.  CARDIOVASCULAR: Normal rate, regular rhythm and no appreciable abnormal heart sounds.  PULMONARY/CHEST: Effort normal. No accessory muscle usage or stridor. No respiratory distress.  No appreciable abnormal breath sounds.  ABDOMEN: Soft, non-distended. No tenderness. No rigidity, rebound or guarding.   MUSCULOSKELETAL: Extremities warm and seemingly well perfused. Some RLE swelling. Abnormal skin findings as shown below. Neurovascuarly intact.  NEUROLOGIC: Awake, alert. Not disoriented. ormal tone. No seizure activity. Coordination normal. GCS 15  SKIN: Bullae of multiple ages (none hemorrhagic), surrounding erythema, some of which appears more contact in etiology, other areas more generally erythematous and warm, appearing more like cellulitis. No crepitus. No skin sloughing. No mucosal involvement.   PSYCHIATRIC: Normal mood and affect. Speech and behavior normal. Thought processes linear. Cognition and memory are normal.                     ED Course     ED Course   Value Comment By Time   Procalcitonin: <0.05 (Reviewed) Lidia Garrido MD 08/16 0011   CRP Inflammation: 3.7 (Reviewed) Lidia Garrido MD 08/16 0011     Procedures       12:13 AM  The patient was seen and examined by Dr. Garrido in Room 10.          Assessments & Plan (with Medical Decision Making)   IMPRESSION: 58-year-old female who underwent right sided knee surgery on 7/5/17, presents with ongoing erythema and blister formation on the RLE with worsening erythema and warmth as described  further above in HPI/ROS.  Clinically, patient appears nontoxic, NAD and is generally asymptomatic with the exception of the RLE.  Examination of the RLE shows erythema on the medial aspect of the left mid leg and erythema extending down towards the calf/lower leg with multiple bullae of various age as shown above in physical exam pictures.  This area is abnormally warm compared to the opposite extremity and is clinically somewhat consistent with cellulitis.  The fluid that is present on the left medial blister appears to be generally clear.  The fluid that appears to be present in the anterior portion just inferior to the knee appears to be more milky/yellow in color.  The incision in the knee itself appears to be more closely within normal limits as it would be expected for this period of time postop.  Thankfully, patient is systemically well, afebrile, no skin sloughing or mucosal involvement.  No hemorrhagic bullae.  No petechia or purpura.    DDx includes but not limited to cellulitis/skin infection (superimposed on a contact dermatitis), autoimmune type of skin changes, et al. No sloughing or crepitus, does not appear consistent with necrotizing skin/soft tissue infection. No fever or mucosal involvement.     PLAN: Labs, lower extremity ultrasound, wound cultures    RESULTS:  - Labs: ESR 69, K3.1  --- Wound cultures/gram stains pending  --- See ED Course section above for particular pertinent findings and comments  - Imaging: Images and written preliminary reports reviewed by myself and revealed:  --- RLE US: No evidence for DVT    INTERVENTIONS:   - IV vancomycin  - PO KCl    RE-EVALUATION:  See ED Course section above for particular pertinent findings and comments  - The patient's symptoms were grossly unchanged while in ED.  - Patient observed for multiple hours with multiple repeat exams and remains hemodynamically stable.  No acute skin changes    DISCUSSIONS:  - I discussed the care of the patient with  Dermatology, IM, overnight EM MD (should patient have any additional needs while still in ED).   - w/ Dermatology: The degree with our plan for admission and IV antibiotics.  There is some other types of blistering-type conditions that they could consider but do not have any immediate recommendations for here overnight but always be consulted for further recommendations  - w/ IM: They will admit for further evaluation/management.  No additional requests or recommendations from our standpoint while still in ED.  - w/ Patient: I have reviewed the available findings, plan with the patient. She expressed understanding and agreement with this plan. All questions answered to the best of our ability at this time.     DISPOSITION/PLANNING:  - FINAL IMPRESSION: RLE cellulitis, bullae  - DISPOSITION: Admit to IM for further evaluation/management  --- Pending: Wound cultures/gram stains  --- Recommendations: Consider Dermatology, Ortho consult      ______________________________________________________________________________    - I have reviewed the available nursing notes.    New Prescriptions    No medications on file       Final diagnoses:   None   Seble MCKEON, am serving as a trained medical scribe to document services personally performed by Lidia Garrido MD, based on the provider's statements to me.   Lidia MCKEON MD, was physically present and have reviewed and verified the accuracy of this note documented by Seble Maria.      8/15/2017   [facility], EMERGENCY DEPARTMENT     Lidia Garrido MD  08/17/17 0256     stable

## 2021-07-21 LAB
ALBUMIN SERPL ELPH-MCNC: 3.1 G/DL — LOW (ref 3.3–5)
ALP SERPL-CCNC: 67 U/L — SIGNIFICANT CHANGE UP (ref 30–120)
ALT FLD-CCNC: 80 U/L DA — HIGH (ref 10–60)
ANION GAP SERPL CALC-SCNC: 5 MMOL/L — SIGNIFICANT CHANGE UP (ref 5–17)
AST SERPL-CCNC: 109 U/L — HIGH (ref 10–40)
BILIRUB SERPL-MCNC: 0.8 MG/DL — SIGNIFICANT CHANGE UP (ref 0.2–1.2)
BUN SERPL-MCNC: 15 MG/DL — SIGNIFICANT CHANGE UP (ref 7–23)
CALCIUM SERPL-MCNC: 8.5 MG/DL — SIGNIFICANT CHANGE UP (ref 8.4–10.5)
CHLORIDE SERPL-SCNC: 104 MMOL/L — SIGNIFICANT CHANGE UP (ref 96–108)
CO2 SERPL-SCNC: 26 MMOL/L — SIGNIFICANT CHANGE UP (ref 22–31)
COVID-19 SPIKE DOMAIN AB INTERP: POSITIVE
COVID-19 SPIKE DOMAIN ANTIBODY RESULT: >250 U/ML — HIGH
CREAT SERPL-MCNC: 0.65 MG/DL — SIGNIFICANT CHANGE UP (ref 0.5–1.3)
GLUCOSE SERPL-MCNC: 98 MG/DL — SIGNIFICANT CHANGE UP (ref 70–99)
HCT VFR BLD CALC: 34.9 % — SIGNIFICANT CHANGE UP (ref 34.5–45)
HGB BLD-MCNC: 11.6 G/DL — SIGNIFICANT CHANGE UP (ref 11.5–15.5)
MCHC RBC-ENTMCNC: 33 PG — SIGNIFICANT CHANGE UP (ref 27–34)
MCHC RBC-ENTMCNC: 33.2 GM/DL — SIGNIFICANT CHANGE UP (ref 32–36)
MCV RBC AUTO: 99.4 FL — SIGNIFICANT CHANGE UP (ref 80–100)
NRBC # BLD: 0 /100 WBCS — SIGNIFICANT CHANGE UP (ref 0–0)
PLATELET # BLD AUTO: 287 K/UL — SIGNIFICANT CHANGE UP (ref 150–400)
POTASSIUM SERPL-MCNC: 4.2 MMOL/L — SIGNIFICANT CHANGE UP (ref 3.5–5.3)
POTASSIUM SERPL-SCNC: 4.2 MMOL/L — SIGNIFICANT CHANGE UP (ref 3.5–5.3)
PROT SERPL-MCNC: 6 G/DL — SIGNIFICANT CHANGE UP (ref 6–8.3)
RBC # BLD: 3.51 M/UL — LOW (ref 3.8–5.2)
RBC # FLD: 12.1 % — SIGNIFICANT CHANGE UP (ref 10.3–14.5)
SARS-COV-2 IGG+IGM SERPL QL IA: >250 U/ML — HIGH
SARS-COV-2 IGG+IGM SERPL QL IA: POSITIVE
SODIUM SERPL-SCNC: 135 MMOL/L — SIGNIFICANT CHANGE UP (ref 135–145)
WBC # BLD: 11.38 K/UL — HIGH (ref 3.8–10.5)
WBC # FLD AUTO: 11.38 K/UL — HIGH (ref 3.8–10.5)

## 2021-07-21 PROCEDURE — 99232 SBSQ HOSP IP/OBS MODERATE 35: CPT

## 2021-07-21 RX ORDER — POLYETHYLENE GLYCOL 3350 17 G/17G
17 POWDER, FOR SOLUTION ORAL
Qty: 0 | Refills: 0 | DISCHARGE
Start: 2021-07-21

## 2021-07-21 RX ORDER — ACETAMINOPHEN 500 MG
1 TABLET ORAL
Qty: 0 | Refills: 0 | DISCHARGE
Start: 2021-07-21

## 2021-07-21 RX ORDER — SENNA PLUS 8.6 MG/1
2 TABLET ORAL
Qty: 0 | Refills: 0 | DISCHARGE
Start: 2021-07-21

## 2021-07-21 RX ORDER — HYDROMORPHONE HYDROCHLORIDE 2 MG/ML
2 INJECTION INTRAMUSCULAR; INTRAVENOUS; SUBCUTANEOUS
Refills: 0 | Status: DISCONTINUED | OUTPATIENT
Start: 2021-07-21 | End: 2021-07-22

## 2021-07-21 RX ORDER — ONDANSETRON 8 MG/1
4 TABLET, FILM COATED ORAL ONCE
Refills: 0 | Status: COMPLETED | OUTPATIENT
Start: 2021-07-21 | End: 2021-07-21

## 2021-07-21 RX ORDER — DUPILUMAB 300 MG/2ML
0 INJECTION, SOLUTION SUBCUTANEOUS
Qty: 0 | Refills: 0 | DISCHARGE

## 2021-07-21 RX ORDER — HYDROMORPHONE HYDROCHLORIDE 2 MG/ML
0.5 INJECTION INTRAMUSCULAR; INTRAVENOUS; SUBCUTANEOUS
Refills: 0 | Status: DISCONTINUED | OUTPATIENT
Start: 2021-07-21 | End: 2021-07-27

## 2021-07-21 RX ORDER — ACETAMINOPHEN 500 MG
1000 TABLET ORAL EVERY 6 HOURS
Refills: 0 | Status: COMPLETED | OUTPATIENT
Start: 2021-07-21 | End: 2021-07-21

## 2021-07-21 RX ORDER — HYDROMORPHONE HYDROCHLORIDE 2 MG/ML
0.5 INJECTION INTRAMUSCULAR; INTRAVENOUS; SUBCUTANEOUS ONCE
Refills: 0 | Status: DISCONTINUED | OUTPATIENT
Start: 2021-07-21 | End: 2021-07-21

## 2021-07-21 RX ORDER — HYDROMORPHONE HYDROCHLORIDE 2 MG/ML
4 INJECTION INTRAMUSCULAR; INTRAVENOUS; SUBCUTANEOUS
Refills: 0 | Status: DISCONTINUED | OUTPATIENT
Start: 2021-07-21 | End: 2021-07-22

## 2021-07-21 RX ORDER — HYDROMORPHONE HYDROCHLORIDE 2 MG/ML
0.5 INJECTION INTRAMUSCULAR; INTRAVENOUS; SUBCUTANEOUS EVERY 4 HOURS
Refills: 0 | Status: DISCONTINUED | OUTPATIENT
Start: 2021-07-21 | End: 2021-07-21

## 2021-07-21 RX ORDER — CELECOXIB 200 MG/1
0 CAPSULE ORAL
Qty: 0 | Refills: 0 | DISCHARGE

## 2021-07-21 RX ORDER — ACETAMINOPHEN 500 MG
500 TABLET ORAL EVERY 6 HOURS
Refills: 0 | Status: DISCONTINUED | OUTPATIENT
Start: 2021-07-22 | End: 2021-07-27

## 2021-07-21 RX ADMIN — OXYCODONE HYDROCHLORIDE 10 MILLIGRAM(S): 5 TABLET ORAL at 15:45

## 2021-07-21 RX ADMIN — HYDROMORPHONE HYDROCHLORIDE 0.5 MILLIGRAM(S): 2 INJECTION INTRAMUSCULAR; INTRAVENOUS; SUBCUTANEOUS at 03:41

## 2021-07-21 RX ADMIN — CELECOXIB 200 MILLIGRAM(S): 200 CAPSULE ORAL at 21:13

## 2021-07-21 RX ADMIN — HYDROMORPHONE HYDROCHLORIDE 0.5 MILLIGRAM(S): 2 INJECTION INTRAMUSCULAR; INTRAVENOUS; SUBCUTANEOUS at 16:06

## 2021-07-21 RX ADMIN — OXYCODONE HYDROCHLORIDE 10 MILLIGRAM(S): 5 TABLET ORAL at 07:18

## 2021-07-21 RX ADMIN — OXYCODONE HYDROCHLORIDE 10 MILLIGRAM(S): 5 TABLET ORAL at 02:12

## 2021-07-21 RX ADMIN — PANTOPRAZOLE SODIUM 40 MILLIGRAM(S): 20 TABLET, DELAYED RELEASE ORAL at 05:57

## 2021-07-21 RX ADMIN — HYDROMORPHONE HYDROCHLORIDE 0.5 MILLIGRAM(S): 2 INJECTION INTRAMUSCULAR; INTRAVENOUS; SUBCUTANEOUS at 16:21

## 2021-07-21 RX ADMIN — ONDANSETRON 4 MILLIGRAM(S): 8 TABLET, FILM COATED ORAL at 09:17

## 2021-07-21 RX ADMIN — OXYCODONE HYDROCHLORIDE 10 MILLIGRAM(S): 5 TABLET ORAL at 06:41

## 2021-07-21 RX ADMIN — APIXABAN 2.5 MILLIGRAM(S): 2.5 TABLET, FILM COATED ORAL at 21:13

## 2021-07-21 RX ADMIN — OXYCODONE HYDROCHLORIDE 10 MILLIGRAM(S): 5 TABLET ORAL at 11:08

## 2021-07-21 RX ADMIN — Medication 50 MICROGRAM(S): at 05:57

## 2021-07-21 RX ADMIN — SENNA PLUS 2 TABLET(S): 8.6 TABLET ORAL at 21:13

## 2021-07-21 RX ADMIN — Medication 400 MILLIGRAM(S): at 17:00

## 2021-07-21 RX ADMIN — HYDROMORPHONE HYDROCHLORIDE 4 MILLIGRAM(S): 2 INJECTION INTRAMUSCULAR; INTRAVENOUS; SUBCUTANEOUS at 18:50

## 2021-07-21 RX ADMIN — OXYCODONE HYDROCHLORIDE 10 MILLIGRAM(S): 5 TABLET ORAL at 03:00

## 2021-07-21 RX ADMIN — Medication 100 MILLIGRAM(S): at 03:47

## 2021-07-21 RX ADMIN — APIXABAN 2.5 MILLIGRAM(S): 2.5 TABLET, FILM COATED ORAL at 12:49

## 2021-07-21 RX ADMIN — OXYCODONE HYDROCHLORIDE 10 MILLIGRAM(S): 5 TABLET ORAL at 15:15

## 2021-07-21 RX ADMIN — ATORVASTATIN CALCIUM 10 MILLIGRAM(S): 80 TABLET, FILM COATED ORAL at 21:13

## 2021-07-21 RX ADMIN — SODIUM CHLORIDE 100 MILLILITER(S): 9 INJECTION, SOLUTION INTRAVENOUS at 05:58

## 2021-07-21 RX ADMIN — Medication 1000 MILLIGRAM(S): at 06:45

## 2021-07-21 RX ADMIN — CELECOXIB 200 MILLIGRAM(S): 200 CAPSULE ORAL at 21:19

## 2021-07-21 RX ADMIN — HYDROMORPHONE HYDROCHLORIDE 4 MILLIGRAM(S): 2 INJECTION INTRAMUSCULAR; INTRAVENOUS; SUBCUTANEOUS at 21:13

## 2021-07-21 RX ADMIN — Medication 400 MILLIGRAM(S): at 00:11

## 2021-07-21 RX ADMIN — Medication 1000 MILLIGRAM(S): at 11:31

## 2021-07-21 RX ADMIN — OXYCODONE HYDROCHLORIDE 10 MILLIGRAM(S): 5 TABLET ORAL at 11:32

## 2021-07-21 RX ADMIN — Medication 1000 MILLIGRAM(S): at 06:41

## 2021-07-21 RX ADMIN — POLYETHYLENE GLYCOL 3350 17 GRAM(S): 17 POWDER, FOR SOLUTION ORAL at 21:13

## 2021-07-21 RX ADMIN — HYDROMORPHONE HYDROCHLORIDE 0.5 MILLIGRAM(S): 2 INJECTION INTRAMUSCULAR; INTRAVENOUS; SUBCUTANEOUS at 22:18

## 2021-07-21 RX ADMIN — HYDROMORPHONE HYDROCHLORIDE 0.5 MILLIGRAM(S): 2 INJECTION INTRAMUSCULAR; INTRAVENOUS; SUBCUTANEOUS at 04:05

## 2021-07-21 RX ADMIN — HYDROMORPHONE HYDROCHLORIDE 4 MILLIGRAM(S): 2 INJECTION INTRAMUSCULAR; INTRAVENOUS; SUBCUTANEOUS at 18:20

## 2021-07-21 RX ADMIN — HYDROMORPHONE HYDROCHLORIDE 0.5 MILLIGRAM(S): 2 INJECTION INTRAMUSCULAR; INTRAVENOUS; SUBCUTANEOUS at 08:56

## 2021-07-21 RX ADMIN — HYDROMORPHONE HYDROCHLORIDE 0.5 MILLIGRAM(S): 2 INJECTION INTRAMUSCULAR; INTRAVENOUS; SUBCUTANEOUS at 09:11

## 2021-07-21 RX ADMIN — Medication 1000 MILLIGRAM(S): at 17:00

## 2021-07-21 RX ADMIN — Medication 1000 MILLIGRAM(S): at 00:26

## 2021-07-21 RX ADMIN — HYDROMORPHONE HYDROCHLORIDE 0.5 MILLIGRAM(S): 2 INJECTION INTRAMUSCULAR; INTRAVENOUS; SUBCUTANEOUS at 22:40

## 2021-07-21 RX ADMIN — HYDROMORPHONE HYDROCHLORIDE 4 MILLIGRAM(S): 2 INJECTION INTRAMUSCULAR; INTRAVENOUS; SUBCUTANEOUS at 22:14

## 2021-07-21 RX ADMIN — Medication 400 MILLIGRAM(S): at 11:10

## 2021-07-21 NOTE — PROGRESS NOTE ADULT - SUBJECTIVE AND OBJECTIVE BOX
ORTHOPEDIC ATTENDING PROGRESS NOTE  TJ SAMUELS      56y Female                                                                                                                               POD #   1     STATUS POST:               Pre-Op Dx: Right knee DJD      Post-Op Dx:  Right knee DJD      Procedure: Right total knee replacement                                                  Pain (0-10):  Pt reports  Current Pain Management:  [ ] PCA   [x ] Po Analgesics [ ] IM /IV Anagesics     T(F): 98.9  HR: 63  BP: 99/67  RR: 18  SpO2: 93%                         11.6   11.38 )-----------( 287      ( 21 Jul 2021 07:09 )             34.9               Physical Exam :    -  Dressing C/D/I.   -   Distal Neurvascular status intact grossly.   -   Warm well perfused; capillary refill <3 seconds   -   (+)EHL/FHL   -   (+) Sensation to light touch  -   (-) Calf tenderness Bilaterally    A/P: 56y Female s/p Right total knee replacement       -   Ortho Stable  -   Pain control   -   Medicine to follow  -   DVT ppx:     [ ]SCDs     [x ] ASA     [ ] Eliquis     [ ] Lovenox  -   Weight bearing status:  WBAT [x ]        PWB    [ ]     TTWB  [ ]      NWB  [ ]   -  Dispo:     Home [x ]     Acute Rehab [ ]     REINA [ ]     TBD [ ]

## 2021-07-21 NOTE — OCCUPATIONAL THERAPY INITIAL EVALUATION ADULT - ADDITIONAL COMMENTS
Pt lives with  and 17 year-old twins in private home with 5 GLADYS with handrail and no steps inside. Kids and  will be available to assist pt as needed. Pt lives with  and 17 year-old twins in private home with 5 GLADYS with handrail and no steps inside. Kids and  will be available to assist pt as needed.  +stall shower

## 2021-07-21 NOTE — PROGRESS NOTE ADULT - SUBJECTIVE AND OBJECTIVE BOX
Review of Systems:  General:denies fever chills, headache, weakness  HEENT: denies blurry vision,diffculty swallowing, difficulty hearing, tinnitus  Cardiovascular: denies chest pain  ,palpitations  Pulmonary:denies shortness of breath, cough, wheezing, hemoptysis  Gastrointestinal: denies abdominal pain, constipation, diarrhea,nausea , vomiting, hematochezia  : denies hematuria, dysuria, or incontinence  Neurological: denies weakness, numbness , tingling, dizziness, tremors  MSK: denies muscle pain, difficulty ambulating, swelling, back pain  skin: denies skin rash, itching, burning, or  skin lesions  Psychiatrical: denies mood disturbances, anxierty, feeling depressed, depression , or difficulty sleeping    Objective:  Vitals  T(C): 37.2 (07-21-21 @ 07:47), Max: 37.2 (07-21-21 @ 07:47)  HR: 63 (07-21-21 @ 07:47) (57 - 76)  BP: 99/67 (07-21-21 @ 07:47) (87/62 - 112/76)  RR: 18 (07-21-21 @ 07:47) (13 - 21)  SpO2: 93% (07-21-21 @ 07:47) (93% - 98%)    Physical Exam:  General: comfortable, no acute distress, well nourished  HEENT: Atraumatic, no LAD, trachea midline, PERRLA  Cardiovascular: normal s1s2, no murmurs, gallops or fricition rubs  Pulmonary: clear to ausculation Bilaterally, no wheezing , rhonchi  Gastrointestinal: soft non tender non distended, no masses felt, no organomegally  Muscloskeletal: no lower extremity edema, intact bilateral lower extremity pulses  Neurological: CN II-12 intact. No focal weakness  Psychiatrical: normal mood, cooperative  SKIN: no rash, lesions or ulcers    Labs:                          11.6   11.38 )-----------( 287      ( 21 Jul 2021 07:09 )             34.9     07-21    135  |  104  |  15  ----------------------------<  98  4.2   |  26  |  0.65    Ca    8.5      21 Jul 2021 07:09    TPro  6.0  /  Alb  3.1<L>  /  TBili  0.8  /  DBili  x   /  AST  109<H>  /  ALT  80<H>  /  AlkPhos  67  07-21    LIVER FUNCTIONS - ( 21 Jul 2021 07:09 )  Alb: 3.1 g/dL / Pro: 6.0 g/dL / ALK PHOS: 67 U/L / ALT: 80 U/L DA / AST: 109 U/L / GGT: x                 Active Medications  MEDICATIONS  (STANDING):  acetaminophen  IVPB .. 1000 milliGRAM(s) IV Intermittent every 6 hours  apixaban 2.5 milliGRAM(s) Oral <User Schedule>  atorvastatin 10 milliGRAM(s) Oral at bedtime  celecoxib 200 milliGRAM(s) Oral every 12 hours  lactated ringers. 1000 milliLiter(s) (100 mL/Hr) IV Continuous <Continuous>  levothyroxine 50 MICROGram(s) Oral daily  metoprolol succinate ER 50 milliGRAM(s) Oral daily  pantoprazole    Tablet 40 milliGRAM(s) Oral before breakfast  polyethylene glycol 3350 17 Gram(s) Oral at bedtime  senna 2 Tablet(s) Oral at bedtime    MEDICATIONS  (PRN):  HYDROmorphone  Injectable 0.5 milliGRAM(s) IV Push every 3 hours PRN breakthrough pain  magnesium hydroxide Suspension 30 milliLiter(s) Oral daily PRN Constipation  oxyCODONE    IR 5 milliGRAM(s) Oral every 3 hours PRN Moderate Pain (4 - 6)  oxyCODONE    IR 10 milliGRAM(s) Oral every 3 hours PRN Severe Pain (7 - 10)     Patient seen and examined at bedside. no acute overnight events. patient feels well no active complaints    Review of Systems:  General:denies fever chills, headache, weakness  HEENT: denies blurry vision,diffculty swallowing, difficulty hearing, tinnitus  Cardiovascular: denies chest pain  ,palpitations  Pulmonary:denies shortness of breath, cough, wheezing, hemoptysis  Gastrointestinal: denies abdominal pain, constipation, diarrhea,nausea , vomiting, hematochezia  : denies hematuria, dysuria, or incontinence  Neurological: denies weakness, numbness , tingling, dizziness, tremors  MSK: denies muscle pain, difficulty ambulating, swelling, back pain  skin: denies skin rash, itching, burning, or  skin lesions  Psychiatrical: denies mood disturbances, anxierty, feeling depressed, depression , or difficulty sleeping    Objective:  Vitals  T(C): 37.2 (07-21-21 @ 07:47), Max: 37.2 (07-21-21 @ 07:47)  HR: 63 (07-21-21 @ 07:47) (57 - 76)  BP: 99/67 (07-21-21 @ 07:47) (87/62 - 112/76)  RR: 18 (07-21-21 @ 07:47) (13 - 21)  SpO2: 93% (07-21-21 @ 07:47) (93% - 98%)    Physical Exam:  General: comfortable, no acute distress, well nourished  HEENT: Atraumatic, no LAD, trachea midline, PERRLA  Cardiovascular: normal s1s2, no murmurs, gallops or fricition rubs  Pulmonary: clear to ausculation Bilaterally, no wheezing , rhonchi  Gastrointestinal: soft non tender non distended, no masses felt, no organomegally  Muscloskeletal: no lower extremity edema, intact bilateral lower extremity pulses  Neurological: CN II-12 intact. No focal weakness  Psychiatrical: normal mood, cooperative  SKIN: no rash, lesions or ulcers    Labs:                          11.6   11.38 )-----------( 287      ( 21 Jul 2021 07:09 )             34.9     07-21    135  |  104  |  15  ----------------------------<  98  4.2   |  26  |  0.65    Ca    8.5      21 Jul 2021 07:09    TPro  6.0  /  Alb  3.1<L>  /  TBili  0.8  /  DBili  x   /  AST  109<H>  /  ALT  80<H>  /  AlkPhos  67  07-21    LIVER FUNCTIONS - ( 21 Jul 2021 07:09 )  Alb: 3.1 g/dL / Pro: 6.0 g/dL / ALK PHOS: 67 U/L / ALT: 80 U/L DA / AST: 109 U/L / GGT: x                 Active Medications  MEDICATIONS  (STANDING):  acetaminophen  IVPB .. 1000 milliGRAM(s) IV Intermittent every 6 hours  apixaban 2.5 milliGRAM(s) Oral <User Schedule>  atorvastatin 10 milliGRAM(s) Oral at bedtime  celecoxib 200 milliGRAM(s) Oral every 12 hours  lactated ringers. 1000 milliLiter(s) (100 mL/Hr) IV Continuous <Continuous>  levothyroxine 50 MICROGram(s) Oral daily  metoprolol succinate ER 50 milliGRAM(s) Oral daily  pantoprazole    Tablet 40 milliGRAM(s) Oral before breakfast  polyethylene glycol 3350 17 Gram(s) Oral at bedtime  senna 2 Tablet(s) Oral at bedtime    MEDICATIONS  (PRN):  HYDROmorphone  Injectable 0.5 milliGRAM(s) IV Push every 3 hours PRN breakthrough pain  magnesium hydroxide Suspension 30 milliLiter(s) Oral daily PRN Constipation  oxyCODONE    IR 5 milliGRAM(s) Oral every 3 hours PRN Moderate Pain (4 - 6)  oxyCODONE    IR 10 milliGRAM(s) Oral every 3 hours PRN Severe Pain (7 - 10)

## 2021-07-22 ENCOUNTER — TRANSCRIPTION ENCOUNTER (OUTPATIENT)
Age: 56
End: 2021-07-22

## 2021-07-22 LAB
ALBUMIN SERPL ELPH-MCNC: 2.9 G/DL — LOW (ref 3.3–5)
ALP SERPL-CCNC: 72 U/L — SIGNIFICANT CHANGE UP (ref 30–120)
ALT FLD-CCNC: 72 U/L DA — HIGH (ref 10–60)
ANION GAP SERPL CALC-SCNC: 6 MMOL/L — SIGNIFICANT CHANGE UP (ref 5–17)
APPEARANCE UR: CLEAR — SIGNIFICANT CHANGE UP
AST SERPL-CCNC: 44 U/L — HIGH (ref 10–40)
BILIRUB DIRECT SERPL-MCNC: 0 MG/DL — SIGNIFICANT CHANGE UP (ref 0–0.2)
BILIRUB INDIRECT FLD-MCNC: 0.5 MG/DL — SIGNIFICANT CHANGE UP (ref 0.2–1)
BILIRUB SERPL-MCNC: 0.5 MG/DL — SIGNIFICANT CHANGE UP (ref 0.2–1.2)
BILIRUB UR-MCNC: NEGATIVE — SIGNIFICANT CHANGE UP
BUN SERPL-MCNC: 8 MG/DL — SIGNIFICANT CHANGE UP (ref 7–23)
CALCIUM SERPL-MCNC: 8.8 MG/DL — SIGNIFICANT CHANGE UP (ref 8.4–10.5)
CHLORIDE SERPL-SCNC: 104 MMOL/L — SIGNIFICANT CHANGE UP (ref 96–108)
CO2 SERPL-SCNC: 28 MMOL/L — SIGNIFICANT CHANGE UP (ref 22–31)
COLOR SPEC: YELLOW — SIGNIFICANT CHANGE UP
CREAT SERPL-MCNC: 0.55 MG/DL — SIGNIFICANT CHANGE UP (ref 0.5–1.3)
DIFF PNL FLD: ABNORMAL
GLUCOSE SERPL-MCNC: 102 MG/DL — HIGH (ref 70–99)
GLUCOSE UR QL: NEGATIVE MG/DL — SIGNIFICANT CHANGE UP
HCT VFR BLD CALC: 34.4 % — LOW (ref 34.5–45)
HGB BLD-MCNC: 11.1 G/DL — LOW (ref 11.5–15.5)
KETONES UR-MCNC: NEGATIVE — SIGNIFICANT CHANGE UP
LEUKOCYTE ESTERASE UR-ACNC: ABNORMAL
MCHC RBC-ENTMCNC: 32.3 GM/DL — SIGNIFICANT CHANGE UP (ref 32–36)
MCHC RBC-ENTMCNC: 32.3 PG — SIGNIFICANT CHANGE UP (ref 27–34)
MCV RBC AUTO: 100 FL — SIGNIFICANT CHANGE UP (ref 80–100)
NITRITE UR-MCNC: NEGATIVE — SIGNIFICANT CHANGE UP
NRBC # BLD: 0 /100 WBCS — SIGNIFICANT CHANGE UP (ref 0–0)
PH UR: 6 — SIGNIFICANT CHANGE UP (ref 5–8)
PLATELET # BLD AUTO: 287 K/UL — SIGNIFICANT CHANGE UP (ref 150–400)
POTASSIUM SERPL-MCNC: 4.3 MMOL/L — SIGNIFICANT CHANGE UP (ref 3.5–5.3)
POTASSIUM SERPL-SCNC: 4.3 MMOL/L — SIGNIFICANT CHANGE UP (ref 3.5–5.3)
PROT SERPL-MCNC: 6.3 G/DL — SIGNIFICANT CHANGE UP (ref 6–8.3)
PROT UR-MCNC: NEGATIVE MG/DL — SIGNIFICANT CHANGE UP
RBC # BLD: 3.44 M/UL — LOW (ref 3.8–5.2)
RBC # FLD: 12.2 % — SIGNIFICANT CHANGE UP (ref 10.3–14.5)
RBC CASTS # UR COMP ASSIST: SIGNIFICANT CHANGE UP /HPF (ref 0–4)
SODIUM SERPL-SCNC: 138 MMOL/L — SIGNIFICANT CHANGE UP (ref 135–145)
SP GR SPEC: 1.01 — SIGNIFICANT CHANGE UP (ref 1.01–1.02)
UROBILINOGEN FLD QL: NEGATIVE MG/DL — SIGNIFICANT CHANGE UP
WBC # BLD: 7.69 K/UL — SIGNIFICANT CHANGE UP (ref 3.8–10.5)
WBC # FLD AUTO: 7.69 K/UL — SIGNIFICANT CHANGE UP (ref 3.8–10.5)
WBC UR QL: SIGNIFICANT CHANGE UP

## 2021-07-22 PROCEDURE — 99223 1ST HOSP IP/OBS HIGH 75: CPT

## 2021-07-22 PROCEDURE — 99232 SBSQ HOSP IP/OBS MODERATE 35: CPT

## 2021-07-22 PROCEDURE — 74018 RADEX ABDOMEN 1 VIEW: CPT | Mod: 26

## 2021-07-22 RX ORDER — DUPILUMAB 300 MG/2ML
300 INJECTION, SOLUTION SUBCUTANEOUS ONCE
Refills: 0 | Status: COMPLETED | OUTPATIENT
Start: 2021-07-22 | End: 2021-07-22

## 2021-07-22 RX ORDER — TIZANIDINE 4 MG/1
2 TABLET ORAL AT BEDTIME
Refills: 0 | Status: COMPLETED | OUTPATIENT
Start: 2021-07-22 | End: 2021-07-24

## 2021-07-22 RX ORDER — VORTIOXETINE 5 MG/1
20 TABLET, FILM COATED ORAL AT BEDTIME
Refills: 0 | Status: DISCONTINUED | OUTPATIENT
Start: 2021-07-22 | End: 2021-07-27

## 2021-07-22 RX ORDER — OXYCODONE HYDROCHLORIDE 5 MG/1
5 TABLET ORAL
Refills: 0 | Status: DISCONTINUED | OUTPATIENT
Start: 2021-07-22 | End: 2021-07-22

## 2021-07-22 RX ORDER — OXYCODONE HYDROCHLORIDE 5 MG/1
20 TABLET ORAL EVERY 8 HOURS
Refills: 0 | Status: DISCONTINUED | OUTPATIENT
Start: 2021-07-22 | End: 2021-07-27

## 2021-07-22 RX ORDER — ALPRAZOLAM 0.25 MG
0.25 TABLET ORAL EVERY 8 HOURS
Refills: 0 | Status: DISCONTINUED | OUTPATIENT
Start: 2021-07-22 | End: 2021-07-22

## 2021-07-22 RX ORDER — OXYCODONE HYDROCHLORIDE 5 MG/1
10 TABLET ORAL EVERY 4 HOURS
Refills: 0 | Status: DISCONTINUED | OUTPATIENT
Start: 2021-07-22 | End: 2021-07-27

## 2021-07-22 RX ORDER — OXYCODONE HYDROCHLORIDE 5 MG/1
10 TABLET ORAL
Refills: 0 | Status: DISCONTINUED | OUTPATIENT
Start: 2021-07-22 | End: 2021-07-22

## 2021-07-22 RX ORDER — OXYCODONE HYDROCHLORIDE 5 MG/1
5 TABLET ORAL EVERY 4 HOURS
Refills: 0 | Status: DISCONTINUED | OUTPATIENT
Start: 2021-07-22 | End: 2021-07-27

## 2021-07-22 RX ORDER — ALPRAZOLAM 0.25 MG
0.25 TABLET ORAL EVERY 12 HOURS
Refills: 0 | Status: DISCONTINUED | OUTPATIENT
Start: 2021-07-22 | End: 2021-07-27

## 2021-07-22 RX ADMIN — APIXABAN 2.5 MILLIGRAM(S): 2.5 TABLET, FILM COATED ORAL at 21:30

## 2021-07-22 RX ADMIN — Medication 500 MILLIGRAM(S): at 00:13

## 2021-07-22 RX ADMIN — Medication 500 MILLIGRAM(S): at 18:42

## 2021-07-22 RX ADMIN — Medication 500 MILLIGRAM(S): at 11:30

## 2021-07-22 RX ADMIN — HYDROMORPHONE HYDROCHLORIDE 4 MILLIGRAM(S): 2 INJECTION INTRAMUSCULAR; INTRAVENOUS; SUBCUTANEOUS at 00:13

## 2021-07-22 RX ADMIN — DUPILUMAB 300 MILLIGRAM(S): 300 INJECTION, SOLUTION SUBCUTANEOUS at 19:42

## 2021-07-22 RX ADMIN — ATORVASTATIN CALCIUM 10 MILLIGRAM(S): 80 TABLET, FILM COATED ORAL at 21:30

## 2021-07-22 RX ADMIN — Medication 50 MILLIGRAM(S): at 05:40

## 2021-07-22 RX ADMIN — HYDROMORPHONE HYDROCHLORIDE 4 MILLIGRAM(S): 2 INJECTION INTRAMUSCULAR; INTRAVENOUS; SUBCUTANEOUS at 04:00

## 2021-07-22 RX ADMIN — TIZANIDINE 2 MILLIGRAM(S): 4 TABLET ORAL at 21:08

## 2021-07-22 RX ADMIN — HYDROMORPHONE HYDROCHLORIDE 4 MILLIGRAM(S): 2 INJECTION INTRAMUSCULAR; INTRAVENOUS; SUBCUTANEOUS at 06:01

## 2021-07-22 RX ADMIN — CELECOXIB 200 MILLIGRAM(S): 200 CAPSULE ORAL at 21:29

## 2021-07-22 RX ADMIN — HYDROMORPHONE HYDROCHLORIDE 0.5 MILLIGRAM(S): 2 INJECTION INTRAMUSCULAR; INTRAVENOUS; SUBCUTANEOUS at 02:03

## 2021-07-22 RX ADMIN — HYDROMORPHONE HYDROCHLORIDE 4 MILLIGRAM(S): 2 INJECTION INTRAMUSCULAR; INTRAVENOUS; SUBCUTANEOUS at 03:16

## 2021-07-22 RX ADMIN — OXYCODONE HYDROCHLORIDE 10 MILLIGRAM(S): 5 TABLET ORAL at 18:41

## 2021-07-22 RX ADMIN — OXYCODONE HYDROCHLORIDE 20 MILLIGRAM(S): 5 TABLET ORAL at 11:30

## 2021-07-22 RX ADMIN — OXYCODONE HYDROCHLORIDE 10 MILLIGRAM(S): 5 TABLET ORAL at 23:42

## 2021-07-22 RX ADMIN — OXYCODONE HYDROCHLORIDE 10 MILLIGRAM(S): 5 TABLET ORAL at 19:00

## 2021-07-22 RX ADMIN — OXYCODONE HYDROCHLORIDE 10 MILLIGRAM(S): 5 TABLET ORAL at 14:00

## 2021-07-22 RX ADMIN — Medication 500 MILLIGRAM(S): at 05:40

## 2021-07-22 RX ADMIN — OXYCODONE HYDROCHLORIDE 10 MILLIGRAM(S): 5 TABLET ORAL at 13:30

## 2021-07-22 RX ADMIN — OXYCODONE HYDROCHLORIDE 20 MILLIGRAM(S): 5 TABLET ORAL at 21:08

## 2021-07-22 RX ADMIN — SENNA PLUS 2 TABLET(S): 8.6 TABLET ORAL at 21:30

## 2021-07-22 RX ADMIN — VORTIOXETINE 20 MILLIGRAM(S): 5 TABLET, FILM COATED ORAL at 21:09

## 2021-07-22 RX ADMIN — Medication 500 MILLIGRAM(S): at 11:33

## 2021-07-22 RX ADMIN — Medication 500 MILLIGRAM(S): at 05:41

## 2021-07-22 RX ADMIN — OXYCODONE HYDROCHLORIDE 20 MILLIGRAM(S): 5 TABLET ORAL at 11:31

## 2021-07-22 RX ADMIN — POLYETHYLENE GLYCOL 3350 17 GRAM(S): 17 POWDER, FOR SOLUTION ORAL at 21:28

## 2021-07-22 RX ADMIN — HYDROMORPHONE HYDROCHLORIDE 4 MILLIGRAM(S): 2 INJECTION INTRAMUSCULAR; INTRAVENOUS; SUBCUTANEOUS at 09:15

## 2021-07-22 RX ADMIN — Medication 500 MILLIGRAM(S): at 00:15

## 2021-07-22 RX ADMIN — APIXABAN 2.5 MILLIGRAM(S): 2.5 TABLET, FILM COATED ORAL at 08:38

## 2021-07-22 RX ADMIN — HYDROMORPHONE HYDROCHLORIDE 4 MILLIGRAM(S): 2 INJECTION INTRAMUSCULAR; INTRAVENOUS; SUBCUTANEOUS at 09:45

## 2021-07-22 RX ADMIN — HYDROMORPHONE HYDROCHLORIDE 4 MILLIGRAM(S): 2 INJECTION INTRAMUSCULAR; INTRAVENOUS; SUBCUTANEOUS at 01:00

## 2021-07-22 RX ADMIN — HYDROMORPHONE HYDROCHLORIDE 0.5 MILLIGRAM(S): 2 INJECTION INTRAMUSCULAR; INTRAVENOUS; SUBCUTANEOUS at 21:39

## 2021-07-22 RX ADMIN — HYDROMORPHONE HYDROCHLORIDE 4 MILLIGRAM(S): 2 INJECTION INTRAMUSCULAR; INTRAVENOUS; SUBCUTANEOUS at 07:00

## 2021-07-22 RX ADMIN — PANTOPRAZOLE SODIUM 40 MILLIGRAM(S): 20 TABLET, DELAYED RELEASE ORAL at 05:40

## 2021-07-22 RX ADMIN — Medication 50 MICROGRAM(S): at 05:40

## 2021-07-22 RX ADMIN — Medication 0.25 MILLIGRAM(S): at 11:31

## 2021-07-22 RX ADMIN — HYDROMORPHONE HYDROCHLORIDE 0.5 MILLIGRAM(S): 2 INJECTION INTRAMUSCULAR; INTRAVENOUS; SUBCUTANEOUS at 02:30

## 2021-07-22 RX ADMIN — Medication 500 MILLIGRAM(S): at 23:42

## 2021-07-22 RX ADMIN — CELECOXIB 200 MILLIGRAM(S): 200 CAPSULE ORAL at 08:39

## 2021-07-22 RX ADMIN — HYDROMORPHONE HYDROCHLORIDE 0.5 MILLIGRAM(S): 2 INJECTION INTRAMUSCULAR; INTRAVENOUS; SUBCUTANEOUS at 21:09

## 2021-07-22 RX ADMIN — CELECOXIB 200 MILLIGRAM(S): 200 CAPSULE ORAL at 08:37

## 2021-07-22 NOTE — DISCHARGE NOTE NURSING/CASE MANAGEMENT/SOCIAL WORK - CASE MANAGER'S NAME
Case management department   [phone] Your [facility] RN/ Catalina Hanks can be reached at [phone] or main office # [phone]

## 2021-07-22 NOTE — PROGRESS NOTE ADULT - SUBJECTIVE AND OBJECTIVE BOX
Discharge medication calendar:  Eliquis 2.5mg q12h x 12 days then ASA EC 81mg q12h x 4 weeks  Acetaminophen 500mg PO QID x 2-3 weeks  Celecoxib 200mg q12h x 2-3 weeks  Pantoprazole 40mg QAM (home med)  Narcotic PRN  Docusate 100mg TID while taking narcotic  Miralax, Senna, or Bisacodyl PRN for treatment of constipation

## 2021-07-22 NOTE — CONSULT NOTE ADULT - SUBJECTIVE AND OBJECTIVE BOX
HPI:    PERTINENT PM/SXH:   Hypothyroidism    Benign tumor of breast, left    Hypercholesterolemia    Hypertension    Anxiety and depression    GERD (gastroesophageal reflux disease)    Foot pain, right    Eczema    H/O connective tissue disease    Neutrophilic dermatosis    Osteoarthritis of right knee    History of 2019 novel coronavirus disease (COVID-19)    Gluten intolerance    Fibromyalgia      H/O tubal ligation    H/O lumpectomy    S/P surgical removal of pilonidal cyst    H/O breast implant    History of foot surgery    Status post right foot surgery      FAMILY HISTORY:  FH: brain aneurysm  Mother-     FH: hypertension  Mother-  and younger sister-Alive      ITEMS NOT CHECKED ARE NOT PRESENT    SOCIAL HISTORY:   Significant other/partner[ ]  Children[ ]  Pentecostalism/Spirituality:  Substance hx:  [ ]   Tobacco hx:  [ ]   Alcohol hx: [ ]   Home Opioid hx:  [ ] I-Stop Reference No:  Living Situation: [ ]Home  [ ]Long term care  [ ]Rehab [ ]Other    ADVANCE DIRECTIVES:    DNR  MOLST  [ ]  Living Will  [ ]   DECISION MAKER(s):  [ ] Health Care Proxy(s)  [ ] Surrogate(s)  [ ] Guardian           Name(s): Phone Number(s):    BASELINE (I)ADL(s) (prior to admission):  [place]: [ ]Total  [ ] Moderate [ ]Dependent    Allergies    Gluten (Other)  No Known Drug Allergies    Intolerances    MEDICATIONS  (STANDING):  acetaminophen   Tablet .. 500 milliGRAM(s) Oral every 6 hours  amLODIPine   Tablet 5 milliGRAM(s) Oral daily  apixaban 2.5 milliGRAM(s) Oral every 12 hours  atorvastatin 10 milliGRAM(s) Oral at bedtime  celecoxib 200 milliGRAM(s) Oral every 12 hours  levothyroxine 50 MICROGram(s) Oral daily  metoprolol succinate ER 50 milliGRAM(s) Oral daily  pantoprazole    Tablet 40 milliGRAM(s) Oral before breakfast  polyethylene glycol 3350 17 Gram(s) Oral at bedtime  senna 2 Tablet(s) Oral at bedtime  valsartan 160 milliGRAM(s) Oral daily    MEDICATIONS  (PRN):  bisacodyl Suppository 10 milliGRAM(s) Rectal once PRN Constipation  HYDROmorphone   Tablet 2 milliGRAM(s) Oral every 3 hours PRN Moderate Pain (4 - 6)  HYDROmorphone   Tablet 4 milliGRAM(s) Oral every 3 hours PRN Severe Pain (7 - 10)  HYDROmorphone  Injectable 0.5 milliGRAM(s) IV Push every 3 hours PRN breakthrough pain  magnesium hydroxide Suspension 30 milliLiter(s) Oral daily PRN Constipation    PRESENT SYMPTOMS: [ ]Unable to obtain due to poor mentation   Source if other than patient:  [ ]Family   [ ]Team     Pain: [ ]yes [ ]no  QOL impact -   Location -                    Aggravating factors -  Quality -  Radiation -  Timing-  Severity (0-10 scale):  Minimal acceptable level (0-10 scale):     CPOT:    https://www.[facility].org/getattachment/[email or web address]/Critical-Care-Pain-Observation-Tool-(CPOT)      PAIN AD Score:     http://[email or web address]/painad.pdf (press ctrl +  left click to view)    Dyspnea:                           [ ]Mild [ ]Moderate [ ]Severe  Anxiety:                             [ ]Mild [ ]Moderate [ ]Severe  Fatigue:                             [ ]Mild [ ]Moderate [ ]Severe  Nausea:                             [ ]Mild [ ]Moderate [ ]Severe  Loss of appetite:              [ ]Mild [ ]Moderate [ ]Severe  Constipation:                    [ ]Mild [ ]Moderate [ ]Severe    Other Symptoms:  [ ]All other review of systems negative     Palliative Performance Status Version 2:         %    http://npcrc.org/files/news/palliative_performance_scale_ppsv2.pdf  PHYSICAL EXAM:  Vital Signs Last 24 Hrs  T(C): 36.6 (2021 08:03), Max: 37.1 (2021 00:04)  T(F): 97.8 (2021 08:03), Max: 98.8 (2021 00:04)  HR: 80 (2021 08:03) (66 - 83)  BP: 112/75 (2021 08:03) (108/74 - 154/93)  BP(mean): --  RR: 18 (2021 08:03) (16 - 18)  SpO2: 90% (2021 08:03) (90% - 98%) I&O's Summary    GENERAL:  [ ]Alert  [ ]Oriented x   [ ]Lethargic  [ ]Cachexia  [ ]Unarousable  [ ]Verbal  [ ]Non-Verbal  Behavioral:   [ ] Anxiety  [ ] Delirium [ ] Agitation [ ] Other  HEENT:  [ ]Normal   [ ]Dry mouth   [ ]ET Tube/Trach  [ ]Oral lesions  PULMONARY:   [ ]Clear [ ]Tachypnea  [ ]Audible excessive secretions   [ ]Rhonchi        [ ]Right [ ]Left [ ]Bilateral  [ ]Crackles        [ ]Right [ ]Left [ ]Bilateral  [ ]Wheezing     [ ]Right [ ]Left [ ]Bilateral  [ ]Diminished breath sounds [ ]right [ ]left [ ]bilateral  CARDIOVASCULAR:    [ ]Regular [ ]Irregular [ ]Tachy  [ ][place] [ ]Murmur [ ]Other  GASTROINTESTINAL:  [ ]Soft  [ ]Distended   [ ]+BS  [ ]Non tender [ ]Tender  [ ]PEG [ ]OGT/ NGT  Last BM:   GENITOURINARY:  [ ]Normal [ ] Incontinent   [ ]Oliguria/Anuria   [ ][place]  MUSCULOSKELETAL:   [ ]Normal   [ ]Weakness  [ ]Bed/Wheelchair bound [ ]Edema  NEUROLOGIC:   [ ]No focal deficits  [ ]Cognitive impairment  [ ]Dysphagia [ ]Dysarthria [ ]Paresis [ ]Other   SKIN:   [ ]Normal    [ ]Rash  [ ]Pressure ulcer(s)       Present on admission [ ]y [ ]n    CRITICAL CARE:  [ ] Shock Present  [ ]Septic [ ]Cardiogenic [ ]Neurologic [ ]Hypovolemic  [ ]  Vasopressors [ ]  Inotropes   [ ]Respiratory failure present [ ]Mechanical ventilation [ ]Non-invasive ventilatory support [ ]High flow    [ ]Acute  [ ]Chronic [ ]Hypoxic  [ ]Hypercarbic [ ]Other  [ ]Other organ failure     LABS:                        11.1   7.69  )-----------( 287      ( 2021 07:44 )             34.4       138  |  104  |  8   ----------------------------<  102<H>  4.3   |  28  |  0.55    Ca    8.8      2021 07:43    TPro  6.3  /  Alb  2.9<L>  /  TBili  0.5  /  DBili  0.0  /  AST  44<H>  /  ALT  72<H>  /  AlkPhos  72          RADIOLOGY & ADDITIONAL STUDIES:    PROTEIN CALORIE MALNUTRITION PRESENT: [ ]mild [ ]moderate [ ]severe [ ]underweight [ ]morbid obesity  https://www.andeal.org/vault/2440/web/files/ONC/Table_Clinical%20Characteristics%20to%20Document%20Malnutrition-White%20JV%20et%20al%2020.pdf    Height (cm): 168.9 (21 @ 10:12), 167.6 (21 @ 10:28)  Weight (kg): 84.5 (21 @ 10:12), 83.9 (21 @ 10:28)  BMI (kg/m2): 29.6 (21 @ 10:12), 29.9 (21 @ 10:28)    [ ]PPSV2 < or = to 30% [ ]significant weight loss  [ ]poor nutritional intake  [ ]anasarca      [ ]Artificial Nutrition      REFERRALS:   [ ]Chaplaincy  [ ]Hospice  [ ]Child Life  [ ]Social Work  [ ]Case management [ ]Holistic Therapy     Goals of Care Document:  HPI:  57 yo f pmh hypothyroidism, hld, htn, anxiety/depression, gerd is s/p Right TKR    PERTINENT PM/SXH:   Hypothyroidism    Benign tumor of breast, left    Hypercholesterolemia    Hypertension    Anxiety and depression    GERD (gastroesophageal reflux disease)    Foot pain, right    Eczema    H/O connective tissue disease    Neutrophilic dermatosis    Osteoarthritis of right knee    History of 2019 novel coronavirus disease (COVID-19)    Gluten intolerance    Fibromyalgia      H/O tubal ligation    H/O lumpectomy    S/P surgical removal of pilonidal cyst    H/O breast implant    History of foot surgery    Status post right foot surgery      FAMILY HISTORY:  FH: brain aneurysm  Mother-     FH: hypertension  Mother-  and younger sister-Alive      ITEMS NOT CHECKED ARE NOT PRESENT    SOCIAL HISTORY:   Significant other/partner[x ]  Children[ ]  Tenriism/Spirituality: Gnosticist  Substance hx:  [ ]   Tobacco hx:  [ ]   Alcohol hx: [ ]   Home Opioid hx:  [ ] I-Stop Reference No:  Living Situation: [ x]Home  [ ]Long term care  [ ]Rehab [ ]Other    ADVANCE DIRECTIVES:    DNR  MOLST  [ ]  Living Will  [ ]   DECISION MAKER(s):  [ ] Health Care Proxy(s)  [ ] Surrogate(s)  [ ] Guardian           Name(s): Phone Number(s):    BASELINE (I)ADL(s) (prior to admission):  [place]: [x ]Total  [ ] Moderate [ ]Dependent    Allergies    Gluten (Other)  No Known Drug Allergies    Intolerances    MEDICATIONS  (STANDING):  acetaminophen   Tablet .. 500 milliGRAM(s) Oral every 6 hours  amLODIPine   Tablet 5 milliGRAM(s) Oral daily  apixaban 2.5 milliGRAM(s) Oral every 12 hours  atorvastatin 10 milliGRAM(s) Oral at bedtime  celecoxib 200 milliGRAM(s) Oral every 12 hours  levothyroxine 50 MICROGram(s) Oral daily  metoprolol succinate ER 50 milliGRAM(s) Oral daily  pantoprazole    Tablet 40 milliGRAM(s) Oral before breakfast  polyethylene glycol 3350 17 Gram(s) Oral at bedtime  senna 2 Tablet(s) Oral at bedtime  valsartan 160 milliGRAM(s) Oral daily    MEDICATIONS  (PRN):  bisacodyl Suppository 10 milliGRAM(s) Rectal once PRN Constipation  HYDROmorphone   Tablet 2 milliGRAM(s) Oral every 3 hours PRN Moderate Pain (4 - 6)  HYDROmorphone   Tablet 4 milliGRAM(s) Oral every 3 hours PRN Severe Pain (7 - 10)  HYDROmorphone  Injectable 0.5 milliGRAM(s) IV Push every 3 hours PRN breakthrough pain  magnesium hydroxide Suspension 30 milliLiter(s) Oral daily PRN Constipation    PRESENT SYMPTOMS: [ ]Unable to obtain due to poor mentation   Source if other than patient:  [ ]Family   [ ]Team     Pain: [ x]yes [ ]no  QOL impact - yes   Location -     R knee               Aggravating factors - movement   Quality -  Radiation -  Timing-  Severity (0-10 scale):  Minimal acceptable level (0-10 scale): 3/10    CPOT:    https://www.[facility].org/getattachment/[email or web address]/Critical-Care-Pain-Observation-Tool-(CPOT)      PAIN AD Score:     http://geriatrictoolkit.[email or web address]/cog/painad.pdf (press ctrl +  left click to view)    Dyspnea:                           [ ]Mild [ ]Moderate [ ]Severe  Anxiety:                             [ ]Mild [ ]Moderate [ ]Severe  Fatigue:                             [ ]Mild [ ]Moderate [ ]Severe  Nausea:                             [ ]Mild [ ]Moderate [ ]Severe  Loss of appetite:              [ ]Mild [ ]Moderate [ ]Severe  Constipation:                    [ ]Mild [ ]Moderate [ ]Severe    Other Symptoms:  [x ]All other review of systems negative     Palliative Performance Status Version 2:         %    http://npcrc.org/files/news/palliative_performance_scale_ppsv2.pdf  PHYSICAL EXAM:  Vital Signs Last 24 Hrs  T(C): 36.6 (2021 08:03), Max: 37.1 (2021 00:04)  T(F): 97.8 (2021 08:03), Max: 98.8 (2021 00:04)  HR: 80 (2021 08:03) (66 - 83)  BP: 112/75 (2021 08:03) (108/74 - 154/93)  BP(mean): --  RR: 18 (2021 08:03) (16 - 18)  SpO2: 90% (2021 08:03) (90% - 98%) I&O's Summary    General: comfortable, no acute distress, well nourished  HEENT: Atraumatic, no LAD, trachea midline, PERRLA  Cardiovascular: normal s1s2, no murmurs, gallops or fricition rubs  Pulmonary: clear to ausculation Bilaterally, no wheezing , rhonchi  Gastrointestinal: soft non tender non distended, no masses felt, no organomegally  Muscloskeletal: no lower extremity edema, intact bilateral lower extremity pulses  Neurological: CN II-12 intact. No focal weakness  Psychiatrical: normal mood, cooperative  SKIN: no rash, lesions or ulcers    LABS:                        11.1   7.69  )-----------( 287      ( 2021 07:44 )             34.4       138  |  104  |  8   ----------------------------<  102<H>  4.3   |  28  |  0.55    Ca    8.8      2021 07:43    TPro  6.3  /  Alb  2.9<L>  /  TBili  0.5  /  DBili  0.0  /  AST  44<H>  /  ALT  72<H>  /  AlkPhos  72          RADIOLOGY & ADDITIONAL STUDIES: reviewed    PROTEIN CALORIE MALNUTRITION PRESENT: [ ]mild [ ]moderate [ ]severe [ ]underweight [ ]morbid obesity  https://www.andeal.org/vault/2440/web/files/ONC/Table_Clinical%20Characteristics%20to%20Document%20Malnutrition-White%20JV%20et%20al%2020.pdf    Height (cm): 168.9 (21 @ 10:12), 167.6 (21 @ 10:28)  Weight (kg): 84.5 (21 @ 10:12), 83.9 (21 @ 10:28)  BMI (kg/m2): 29.6 (21 @ 10:12), 29.9 (21 @ 10:28)    [ ]PPSV2 < or = to 30% [ ]significant weight loss  [ ]poor nutritional intake  [ ]anasarca      [ ]Artificial Nutrition      REFERRALS:   [ ]Chaplaincy  [ ]Hospice  [ ]Child Life  [ ]Social Work  [ ]Case management [ ]Holistic Therapy     Goals of Care Document:  HPI:  55 yo f pmh hypothyroidism, hld, htn, anxiety/depression, gerd is s/p Right TKR    PERTINENT PM/SXH:   Hypothyroidism    Benign tumor of breast, left    Hypercholesterolemia    Hypertension    Anxiety and depression    GERD (gastroesophageal reflux disease)    Foot pain, right    Eczema    H/O connective tissue disease    Neutrophilic dermatosis    Osteoarthritis of right knee    History of 2019 novel coronavirus disease (COVID-19)    Gluten intolerance    Fibromyalgia      H/O tubal ligation    H/O lumpectomy    S/P surgical removal of pilonidal cyst    H/O breast implant    History of foot surgery    Status post right foot surgery      FAMILY HISTORY:  FH: brain aneurysm  Mother-     FH: hypertension  Mother-  and younger sister-Alive      ITEMS NOT CHECKED ARE NOT PRESENT    SOCIAL HISTORY:   Significant other/partner[x ]  Children[ ]  Advent/Spirituality: Nondenominational  Substance hx:  [ ]   Tobacco hx:  [ ]   Alcohol hx: [ ]   Home Opioid hx:  [ ] I-Stop Reference No:  Living Situation: [ x]Home  [ ]Long term care  [ ]Rehab [ ]Other    ADVANCE DIRECTIVES:    DNR  MOLST  [ ]  Living Will  [ ]   DECISION MAKER(s):  [ ] Health Care Proxy(s)  [ ] Surrogate(s)  [ ] Guardian           Name(s): Phone Number(s):    BASELINE (I)ADL(s) (prior to admission):  [place]: [x ]Total  [ ] Moderate [ ]Dependent    Allergies    Gluten (Other)  No Known Drug Allergies    Intolerances    MEDICATIONS  (STANDING):  acetaminophen   Tablet .. 500 milliGRAM(s) Oral every 6 hours  amLODIPine   Tablet 5 milliGRAM(s) Oral daily  apixaban 2.5 milliGRAM(s) Oral every 12 hours  atorvastatin 10 milliGRAM(s) Oral at bedtime  celecoxib 200 milliGRAM(s) Oral every 12 hours  levothyroxine 50 MICROGram(s) Oral daily  metoprolol succinate ER 50 milliGRAM(s) Oral daily  pantoprazole    Tablet 40 milliGRAM(s) Oral before breakfast  polyethylene glycol 3350 17 Gram(s) Oral at bedtime  senna 2 Tablet(s) Oral at bedtime  valsartan 160 milliGRAM(s) Oral daily    MEDICATIONS  (PRN):  bisacodyl Suppository 10 milliGRAM(s) Rectal once PRN Constipation  HYDROmorphone   Tablet 2 milliGRAM(s) Oral every 3 hours PRN Moderate Pain (4 - 6)  HYDROmorphone   Tablet 4 milliGRAM(s) Oral every 3 hours PRN Severe Pain (7 - 10)  HYDROmorphone  Injectable 0.5 milliGRAM(s) IV Push every 3 hours PRN breakthrough pain  magnesium hydroxide Suspension 30 milliLiter(s) Oral daily PRN Constipation    PRESENT SYMPTOMS: [ ]Unable to obtain due to poor mentation   Source if other than patient:  [ ]Family   [ ]Team     Pain: [ x]yes [ ]no  QOL impact - yes   Location -     R knee               Aggravating factors - movement   Quality - sharp  Radiation - no  Timing- constant  Severity (0-10 scale): 1010  Minimal acceptable level (0-10 scale): 310    CPOT:    https://www.[facility].org/getattachment/[email or web address]/Critical-Care-Pain-Observation-Tool-(CPOT)      PAIN AD Score:     http://geriatrictoolkit.[email or web address]/cog/painad.pdf (press ctrl +  left click to view)    Dyspnea:                           [ ]Mild [ ]Moderate [ ]Severe  Anxiety:                             [ ]Mild [ ]Moderate [x ]Severe  Fatigue:                             [ ]Mild [ ]Moderate [ ]Severe  Nausea:                             [ ]Mild [ ]Moderate [ ]Severe  Loss of appetite:              [ ]Mild [ ]Moderate [ ]Severe  Constipation:                    [ ]Mild [ ]Moderate [ ]Severe    Other Symptoms:  [x ]All other review of systems negative     Palliative Performance Status Version 2:         %    http://npcrc.org/files/news/palliative_performance_scale_ppsv2.pdf  PHYSICAL EXAM:  Vital Signs Last 24 Hrs  T(C): 36.6 (2021 08:03), Max: 37.1 (2021 00:04)  T(F): 97.8 (2021 08:03), Max: 98.8 (2021 00:04)  HR: 80 (2021 08:03) (66 - 83)  BP: 112/75 (2021 08:03) (108/74 - 154/93)  BP(mean): --  RR: 18 (2021 08:03) (16 - 18)  SpO2: 90% (2021 08:03) (90% - 98%) I&O's Summary    General: comfortable, no acute distress, well nourished  HEENT: Atraumatic, no LAD, trachea midline, PERRLA  Cardiovascular: normal s1s2, no murmurs, gallops or fricition rubs  Pulmonary: clear to ausculation Bilaterally, no wheezing , rhonchi  Gastrointestinal: soft non tender non distended, no masses felt, no organomegally  Muscloskeletal: no lower extremity edema, intact bilateral lower extremity pulses  Neurological: CN II-12 intact. No focal weakness  Psychiatrical: normal mood, cooperative  SKIN: no rash, lesions or ulcers    LABS:                        11.1   7.69  )-----------( 287      ( 2021 07:44 )             34.4       138  |  104  |  8   ----------------------------<  102<H>  4.3   |  28  |  0.55    Ca    8.8      2021 07:43    TPro  6.3  /  Alb  2.9<L>  /  TBili  0.5  /  DBili  0.0  /  AST  44<H>  /  ALT  72<H>  /  AlkPhos  72          RADIOLOGY & ADDITIONAL STUDIES: reviewed    PROTEIN CALORIE MALNUTRITION PRESENT: [ ]mild [ ]moderate [ ]severe [ ]underweight [ ]morbid obesity  https://www.andeal.org/vault/2440/web/files/ONC/Table_Clinical%20Characteristics%20to%20Document%20Malnutrition-White%20JV%20et%20al%2020.pdf    Height (cm): 168.9 (21 @ 10:12), 167.6 (21 @ 10:28)  Weight (kg): 84.5 (21 @ 10:12), 83.9 (21 @ 10:28)  BMI (kg/m2): 29.6 (21 @ 10:12), 29.9 (21 @ 10:28)    [ ]PPSV2 < or = to 30% [ ]significant weight loss  [ ]poor nutritional intake  [ ]anasarca      [ ]Artificial Nutrition      REFERRALS:   [ ]Chaplaincy  [ ]Hospice  [ ]Child Life  [ ]Social Work  [ ]Case management [ ]Holistic Therapy     Goals of Care Document:

## 2021-07-22 NOTE — DISCHARGE NOTE NURSING/CASE MANAGEMENT/SOCIAL WORK - PATIENT PORTAL LINK FT
You can access the FollowMyHealth Patient Portal offered by [facility] by registering at the following website: [email or web address]. By joining [employer]’s FollowMyHealth portal, you will also be able to view your health information using other applications (apps) compatible with our system.

## 2021-07-22 NOTE — CONSULT NOTE ADULT - ASSESSMENT
57 yo f pmh hypothyroidism, hld, htn, anxiety/depression, gerd is s/p Right TKR    1) Intractable pain of R knee S/P Right TKR  - received dilaudid 4 mg prn x6  - dilaudid 0.5 mg IVP prn x4  - oxycodone 10 mg PO prn x3        2) Constipation prophylaxis 2/2 opioid use   - senna and miralax  - bisacodyl supp prn    Appreciate consult. Discussed with the primary team.    Gayathri Keith MD   55 yo f pmh hypothyroidism, hld, htn, anxiety/depression, gerd is s/p Right TKR    1) Intractable pain of R knee S/P Right TKR  2) Severe anxiety exacerbating pain  - Over the last 24 hrs pt received dilaudid 4 mg prn x6, dilaudid 0.5 mg IVP prn x4 and oxycodone 10 mg PO prn x3  - d/c dilaudid  - start oxycontin 20 mg PO q8h RTC  - start oxycodone 5 mg PO q4h prn moderate pain  - start oxycodone 10 mg PO q4h prn severe pain  - start tizanidine qhs for muscle spasm (less sedating compared to valium and flexeril)  - start xanax 0.25 mg PO q12h prn (give first dose now)    3) Constipation prophylaxis 2/2 opioid use   - senna and miralax  - bisacodyl supp prn    Appreciate consult. Discussed with the primary team.    Gayathri Keith MD

## 2021-07-22 NOTE — DISCHARGE NOTE NURSING/CASE MANAGEMENT/SOCIAL WORK - NSSCNAMETXT_GEN_ALL_CORE
[facility] (formerly [facility]) [phone]  [address] [place], [place] [id number]

## 2021-07-22 NOTE — PROGRESS NOTE ADULT - SUBJECTIVE AND OBJECTIVE BOX
Post Op Day #2    SUBJECTIVE    55yo Female status post right TKR .   Patient is alert and comfortable.    Denies nausea, vomiting, chest pain, shortness of breath, abdominal pain or fever.   Complains of right knee pain and thigh pain    OBJECTIVE    Vital Signs Last 24 Hrs  T(C): 36.6 (22 Jul 2021 08:03), Max: 37.1 (22 Jul 2021 00:04)  T(F): 97.8 (22 Jul 2021 08:03), Max: 98.8 (22 Jul 2021 00:04)  HR: 80 (22 Jul 2021 08:03) (66 - 83)  BP: 112/75 (22 Jul 2021 08:03) (108/74 - 154/93)  BP(mean): --  RR: 18 (22 Jul 2021 08:03) (16 - 18)  SpO2: 90% (22 Jul 2021 08:03) (90% - 98%)  I&O's Summary      PHYSICAL EXAM    Right knee incision is clean, dry and intact.   No erythema/ No exudate/ No blistering/ No ecchymosis.   The calf is supple/nontender.   Sensation to light touch is grossly intact distally.   Motor function distally is intact.   No foot drop.   (2+) dorsalis pedis pulse. Capillary refill is less than 2 seconds. No cyanosis.                          11.1<L>  7.69  )-----------( 287      ( 22 Jul 2021 07:44 )             34.4<L>  22 Jul 2021 07:44                        11.6   11.38<H> )-----------( 287      ( 21 Jul 2021 07:09 )             34.9   21 Jul 2021 07:09    22 Jul 2021 07:43    138    |  104    |  8      ----------------------------<  102<H>  4.3     |  28     |  0.55   21 Jul 2021 07:09    135    |  104    |  15     ----------------------------<  98     4.2     |  26     |  0.65     Ca    8.8        22 Jul 2021 07:43  Ca    8.5        21 Jul 2021 07:09    TPro  6.3    /  Alb  2.9<L>  /  TBili  0.5    /  DBili  0.0    /  AST  44<H>  /  ALT  72<H>  /  AlkPhos  72     22 Jul 2021 07:43  TPro  6.0    /  Alb  3.1<L>  /  TBili  0.8    /  DBili  x      /  AST  109<H>  /  ALT  80<H>  /  AlkPhos  67     21 Jul 2021 07:09      ASSESSMENT AND PLAN  - Orthopedically stable  - DVT prophylaxis: PAS + Eliquis 2.5mg twice daily  - Continue physical therapy and occupational therapy  - Weight bearing as tolerated of the right lower extremity with assistance of a walker  - Incentive spirometry encouraged  - Pain control as clinically indicated. Pain management consult called   - Disposition:  Home when cleared by medicine, PT, and OT

## 2021-07-22 NOTE — DISCHARGE NOTE NURSING/CASE MANAGEMENT/SOCIAL WORK - CAREGIVER ADDRESS
[address] [place] [address] [facility]

## 2021-07-22 NOTE — PROVIDER CONTACT NOTE (OTHER) - SITUATION
Informed MD of pt's complaints with difficulty voiding.  Pt stated she voids but has to strain to out some urine.  Dr Quijano made aware of post void volume.

## 2021-07-22 NOTE — PROGRESS NOTE ADULT - SUBJECTIVE AND OBJECTIVE BOX
patient seen and examined at bedside. no acute overnight events  patient reports nausea is less this morning.  ambulating    Review of Systems:  General:denies fever chills, headache, weakness  HEENT: denies blurry vision,diffculty swallowing, difficulty hearing, tinnitus  Cardiovascular: denies chest pain  ,palpitations  Pulmonary:denies shortness of breath, cough, wheezing, hemoptysis  Gastrointestinal: denies abdominal pain, constipation, diarrhea,nausea , vomiting, hematochezia  : denies hematuria, dysuria, or incontinence  Neurological: denies weakness, numbness , tingling, dizziness, tremors  MSK: denies muscle pain, difficulty ambulating, swelling, back pain  skin: denies skin rash, itching, burning, or  skin lesions  Psychiatrical: denies mood disturbances, anxierty, feeling depressed, depression , or difficulty sleeping    Objective:  Vitals  T(C): 36.3 (07-22-21 @ 11:28), Max: 37.1 (07-22-21 @ 00:04)  HR: 67 (07-22-21 @ 11:28) (66 - 83)  BP: 123/78 (07-22-21 @ 11:28) (108/74 - 137/87)  RR: 18 (07-22-21 @ 11:28) (16 - 18)  SpO2: 98% (07-22-21 @ 11:28) (90% - 98%)    Physical Exam:  General: comfortable, no acute distress, well nourished  HEENT: Atraumatic, no LAD, trachea midline, PERRLA  Cardiovascular: normal s1s2, no murmurs, gallops or fricition rubs  Pulmonary: clear to ausculation Bilaterally, no wheezing , rhonchi  Gastrointestinal: soft non tender non distended, no masses felt, no organomegally  Muscloskeletal: no lower extremity edema, intact bilateral lower extremity pulses  Neurological: CN II-12 intact. No focal weakness  Psychiatrical: normal mood, cooperative  SKIN: no rash, lesions or ulcers    Labs:                          11.1   7.69  )-----------( 287      ( 22 Jul 2021 07:44 )             34.4     07-22    138  |  104  |  8   ----------------------------<  102<H>  4.3   |  28  |  0.55    Ca    8.8      22 Jul 2021 07:43    TPro  6.3  /  Alb  2.9<L>  /  TBili  0.5  /  DBili  0.0  /  AST  44<H>  /  ALT  72<H>  /  AlkPhos  72  07-22    LIVER FUNCTIONS - ( 22 Jul 2021 07:43 )  Alb: 2.9 g/dL / Pro: 6.3 g/dL / ALK PHOS: 72 U/L / ALT: 72 U/L DA / AST: 44 U/L / GGT: x                 Active Medications  MEDICATIONS  (STANDING):  acetaminophen   Tablet .. 500 milliGRAM(s) Oral every 6 hours  amLODIPine   Tablet 5 milliGRAM(s) Oral daily  apixaban 2.5 milliGRAM(s) Oral every 12 hours  atorvastatin 10 milliGRAM(s) Oral at bedtime  celecoxib 200 milliGRAM(s) Oral every 12 hours  levothyroxine 50 MICROGram(s) Oral daily  metoprolol succinate ER 50 milliGRAM(s) Oral daily  oxyCODONE  ER Tablet 20 milliGRAM(s) Oral every 8 hours  pantoprazole    Tablet 40 milliGRAM(s) Oral before breakfast  polyethylene glycol 3350 17 Gram(s) Oral at bedtime  senna 2 Tablet(s) Oral at bedtime  tiZANidine 2 milliGRAM(s) Oral at bedtime  valsartan 160 milliGRAM(s) Oral daily    MEDICATIONS  (PRN):  ALPRAZolam 0.25 milliGRAM(s) Oral every 12 hours PRN anxiety  bisacodyl Suppository 10 milliGRAM(s) Rectal once PRN Constipation  HYDROmorphone  Injectable 0.5 milliGRAM(s) IV Push every 3 hours PRN breakthrough pain  magnesium hydroxide Suspension 30 milliLiter(s) Oral daily PRN Constipation  oxyCODONE    IR 5 milliGRAM(s) Oral every 4 hours PRN Moderate Pain (4 - 6)  oxyCODONE    IR 10 milliGRAM(s) Oral every 4 hours PRN Severe Pain (7 - 10)     patient seen and examined at bedside. no acute overnight events  patient reports nausea is less this morning. reports uncontrolled pain. oxy changed to oxycontin  ambulating    Review of Systems:  General:denies fever chills, headache, weakness  HEENT: denies blurry vision,diffculty swallowing, difficulty hearing, tinnitus  Cardiovascular: denies chest pain  ,palpitations  Pulmonary:denies shortness of breath, cough, wheezing, hemoptysis  Gastrointestinal: denies abdominal pain, constipation, diarrhea,nausea , vomiting, hematochezia  : denies hematuria, dysuria, or incontinence  Neurological: denies weakness, numbness , tingling, dizziness, tremors  MSK: denies muscle pain, difficulty ambulating, swelling, back pain  skin: denies skin rash, itching, burning, or  skin lesions  Psychiatrical: denies mood disturbances, anxierty, feeling depressed, depression , or difficulty sleeping    Objective:  Vitals  T(C): 36.3 (07-22-21 @ 11:28), Max: 37.1 (07-22-21 @ 00:04)  HR: 67 (07-22-21 @ 11:28) (66 - 83)  BP: 123/78 (07-22-21 @ 11:28) (108/74 - 137/87)  RR: 18 (07-22-21 @ 11:28) (16 - 18)  SpO2: 98% (07-22-21 @ 11:28) (90% - 98%)    Physical Exam:  General: comfortable, no acute distress, well nourished  HEENT: Atraumatic, no LAD, trachea midline, PERRLA  Cardiovascular: normal s1s2, no murmurs, gallops or fricition rubs  Pulmonary: clear to ausculation Bilaterally, no wheezing , rhonchi  Gastrointestinal: soft non tender non distended, no masses felt, no organomegally  Muscloskeletal: no lower extremity edema, intact bilateral lower extremity pulses  Neurological: CN II-12 intact. No focal weakness  Psychiatrical: normal mood, cooperative  SKIN: no rash, lesions or ulcers    Labs:                          11.1   7.69  )-----------( 287      ( 22 Jul 2021 07:44 )             34.4     07-22    138  |  104  |  8   ----------------------------<  102<H>  4.3   |  28  |  0.55    Ca    8.8      22 Jul 2021 07:43    TPro  6.3  /  Alb  2.9<L>  /  TBili  0.5  /  DBili  0.0  /  AST  44<H>  /  ALT  72<H>  /  AlkPhos  72  07-22    LIVER FUNCTIONS - ( 22 Jul 2021 07:43 )  Alb: 2.9 g/dL / Pro: 6.3 g/dL / ALK PHOS: 72 U/L / ALT: 72 U/L DA / AST: 44 U/L / GGT: x                 Active Medications  MEDICATIONS  (STANDING):  acetaminophen   Tablet .. 500 milliGRAM(s) Oral every 6 hours  amLODIPine   Tablet 5 milliGRAM(s) Oral daily  apixaban 2.5 milliGRAM(s) Oral every 12 hours  atorvastatin 10 milliGRAM(s) Oral at bedtime  celecoxib 200 milliGRAM(s) Oral every 12 hours  levothyroxine 50 MICROGram(s) Oral daily  metoprolol succinate ER 50 milliGRAM(s) Oral daily  oxyCODONE  ER Tablet 20 milliGRAM(s) Oral every 8 hours  pantoprazole    Tablet 40 milliGRAM(s) Oral before breakfast  polyethylene glycol 3350 17 Gram(s) Oral at bedtime  senna 2 Tablet(s) Oral at bedtime  tiZANidine 2 milliGRAM(s) Oral at bedtime  valsartan 160 milliGRAM(s) Oral daily    MEDICATIONS  (PRN):  ALPRAZolam 0.25 milliGRAM(s) Oral every 12 hours PRN anxiety  bisacodyl Suppository 10 milliGRAM(s) Rectal once PRN Constipation  HYDROmorphone  Injectable 0.5 milliGRAM(s) IV Push every 3 hours PRN breakthrough pain  magnesium hydroxide Suspension 30 milliLiter(s) Oral daily PRN Constipation  oxyCODONE    IR 5 milliGRAM(s) Oral every 4 hours PRN Moderate Pain (4 - 6)  oxyCODONE    IR 10 milliGRAM(s) Oral every 4 hours PRN Severe Pain (7 - 10)

## 2021-07-22 NOTE — PROGRESS NOTE ADULT - ASSESSMENT
57 yo f pmh hypothyroidism, hld, htn, anxiety/depression, gerd is s/p Right TKR    #S/P Right TKR  Post op orders per ortho  Pain management  Bowl regimen  Pt eval  Obtain baseline labs    #HLD  continue statins    #HTN  hold diuretics    #Hypothyroidism  levothyroxine    #Anxiety/Depression  continue home med    #GERD  PPI    #DVT proph  per ortho   57 yo f pmh hypothyroidism, hld, htn, anxiety/depression, gerd is s/p Right TKR    #S/P Right TKR  Post op orders per ortho  Pain management: short acting changed to long acting oxycontin  Bowl regimen  Pt eval    #HLD  continue statins    #HTN  hold diuretics unitil pod day 2    #Hypothyroidism  levothyroxine    #Anxiety/Depression  continue home med    #GERD  PPI    #DVT proph  per ortho    addenda; patient reports increasing urination and urgency. bladder scan post void is 44  discussed with nursing. checking u/a and KUB

## 2021-07-23 PROCEDURE — 99232 SBSQ HOSP IP/OBS MODERATE 35: CPT

## 2021-07-23 RX ORDER — OXYCODONE HYDROCHLORIDE 5 MG/1
1 TABLET ORAL
Qty: 42 | Refills: 0
Start: 2021-07-23 | End: 2021-07-29

## 2021-07-23 RX ORDER — ONDANSETRON 8 MG/1
4 TABLET, FILM COATED ORAL ONCE
Refills: 0 | Status: COMPLETED | OUTPATIENT
Start: 2021-07-23 | End: 2021-07-23

## 2021-07-23 RX ORDER — OXYCODONE HYDROCHLORIDE 5 MG/1
1 TABLET ORAL
Qty: 21 | Refills: 0
Start: 2021-07-23 | End: 2021-07-29

## 2021-07-23 RX ADMIN — Medication 50 MICROGRAM(S): at 05:58

## 2021-07-23 RX ADMIN — CELECOXIB 200 MILLIGRAM(S): 200 CAPSULE ORAL at 08:35

## 2021-07-23 RX ADMIN — OXYCODONE HYDROCHLORIDE 20 MILLIGRAM(S): 5 TABLET ORAL at 15:51

## 2021-07-23 RX ADMIN — OXYCODONE HYDROCHLORIDE 20 MILLIGRAM(S): 5 TABLET ORAL at 16:50

## 2021-07-23 RX ADMIN — TIZANIDINE 2 MILLIGRAM(S): 4 TABLET ORAL at 21:40

## 2021-07-23 RX ADMIN — Medication 500 MILLIGRAM(S): at 06:00

## 2021-07-23 RX ADMIN — Medication 500 MILLIGRAM(S): at 12:14

## 2021-07-23 RX ADMIN — Medication 0.25 MILLIGRAM(S): at 08:37

## 2021-07-23 RX ADMIN — OXYCODONE HYDROCHLORIDE 10 MILLIGRAM(S): 5 TABLET ORAL at 00:12

## 2021-07-23 RX ADMIN — OXYCODONE HYDROCHLORIDE 10 MILLIGRAM(S): 5 TABLET ORAL at 18:59

## 2021-07-23 RX ADMIN — PANTOPRAZOLE SODIUM 40 MILLIGRAM(S): 20 TABLET, DELAYED RELEASE ORAL at 05:58

## 2021-07-23 RX ADMIN — HYDROMORPHONE HYDROCHLORIDE 0.5 MILLIGRAM(S): 2 INJECTION INTRAMUSCULAR; INTRAVENOUS; SUBCUTANEOUS at 21:08

## 2021-07-23 RX ADMIN — OXYCODONE HYDROCHLORIDE 20 MILLIGRAM(S): 5 TABLET ORAL at 06:00

## 2021-07-23 RX ADMIN — APIXABAN 2.5 MILLIGRAM(S): 2.5 TABLET, FILM COATED ORAL at 08:35

## 2021-07-23 RX ADMIN — Medication 500 MILLIGRAM(S): at 17:54

## 2021-07-23 RX ADMIN — CELECOXIB 200 MILLIGRAM(S): 200 CAPSULE ORAL at 08:36

## 2021-07-23 RX ADMIN — Medication 500 MILLIGRAM(S): at 05:57

## 2021-07-23 RX ADMIN — CELECOXIB 200 MILLIGRAM(S): 200 CAPSULE ORAL at 21:39

## 2021-07-23 RX ADMIN — OXYCODONE HYDROCHLORIDE 20 MILLIGRAM(S): 5 TABLET ORAL at 21:39

## 2021-07-23 RX ADMIN — OXYCODONE HYDROCHLORIDE 10 MILLIGRAM(S): 5 TABLET ORAL at 19:29

## 2021-07-23 RX ADMIN — HYDROMORPHONE HYDROCHLORIDE 0.5 MILLIGRAM(S): 2 INJECTION INTRAMUSCULAR; INTRAVENOUS; SUBCUTANEOUS at 20:38

## 2021-07-23 RX ADMIN — OXYCODONE HYDROCHLORIDE 10 MILLIGRAM(S): 5 TABLET ORAL at 05:57

## 2021-07-23 RX ADMIN — VORTIOXETINE 20 MILLIGRAM(S): 5 TABLET, FILM COATED ORAL at 21:40

## 2021-07-23 RX ADMIN — Medication 500 MILLIGRAM(S): at 17:55

## 2021-07-23 RX ADMIN — SENNA PLUS 2 TABLET(S): 8.6 TABLET ORAL at 21:39

## 2021-07-23 RX ADMIN — APIXABAN 2.5 MILLIGRAM(S): 2.5 TABLET, FILM COATED ORAL at 21:40

## 2021-07-23 RX ADMIN — ATORVASTATIN CALCIUM 10 MILLIGRAM(S): 80 TABLET, FILM COATED ORAL at 21:40

## 2021-07-23 RX ADMIN — Medication 50 MILLIGRAM(S): at 05:58

## 2021-07-23 RX ADMIN — Medication 10 MILLIGRAM(S): at 18:59

## 2021-07-23 RX ADMIN — POLYETHYLENE GLYCOL 3350 17 GRAM(S): 17 POWDER, FOR SOLUTION ORAL at 21:39

## 2021-07-23 RX ADMIN — ONDANSETRON 4 MILLIGRAM(S): 8 TABLET, FILM COATED ORAL at 06:31

## 2021-07-23 RX ADMIN — OXYCODONE HYDROCHLORIDE 20 MILLIGRAM(S): 5 TABLET ORAL at 05:57

## 2021-07-23 RX ADMIN — OXYCODONE HYDROCHLORIDE 10 MILLIGRAM(S): 5 TABLET ORAL at 06:27

## 2021-07-23 NOTE — PROGRESS NOTE ADULT - SUBJECTIVE AND OBJECTIVE BOX
Patient seen and examined at bedside. no acute overnight events. patient reports nausea  yesterday u/a and KUB checked for increased frequency. u/a  negative KUB +_ constipation    Review of Systems:  General:denies fever chills, headache, weakness  HEENT: denies blurry vision,diffculty swallowing, difficulty hearing, tinnitus  Cardiovascular: denies chest pain  ,palpitations  Pulmonary:denies shortness of breath, cough, wheezing, hemoptysis  Gastrointestinal: denies abdominal pain, constipation, diarrhea,_++nausea , vomiting, hematochezia  : denies hematuria, dysuria, or incontinence  Neurological: denies weakness, numbness , tingling, dizziness, tremors  MSK: denies muscle pain, difficulty ambulating, swelling, back pain  skin: denies skin rash, itching, burning, or  skin lesions  Psychiatrical: denies mood disturbances, anxierty, feeling depressed, depression , or difficulty sleeping    Objective:  Vitals  T(C): 36.9 (07-23-21 @ 07:39), Max: 36.9 (07-22-21 @ 23:20)  HR: 81 (07-23-21 @ 07:39) (67 - 88)  BP: 100/75 (07-23-21 @ 07:39) (97/68 - 123/78)  RR: 18 (07-23-21 @ 07:39) (18 - 18)  SpO2: 90% (07-23-21 @ 07:39) (90% - 98%)    Physical Exam:  General: comfortable, no acute distress, well nourished  HEENT: Atraumatic, no LAD, trachea midline, PERRLA  Cardiovascular: normal s1s2, no murmurs, gallops or fricition rubs  Pulmonary: clear to ausculation Bilaterally, no wheezing , rhonchi  Gastrointestinal: soft non tender non distended, no masses felt, no organomegally  Muscloskeletal: no lower extremity edema, intact bilateral lower extremity pulses  Neurological: CN II-12 intact. No focal weakness  Psychiatrical: normal mood, cooperative  SKIN: no rash, lesions or ulcers    Labs:                          11.1   7.69  )-----------( 287      ( 22 Jul 2021 07:44 )             34.4     07-22    138  |  104  |  8   ----------------------------<  102<H>  4.3   |  28  |  0.55    Ca    8.8      22 Jul 2021 07:43    TPro  6.3  /  Alb  2.9<L>  /  TBili  0.5  /  DBili  0.0  /  AST  44<H>  /  ALT  72<H>  /  AlkPhos  72  07-22    LIVER FUNCTIONS - ( 22 Jul 2021 07:43 )  Alb: 2.9 g/dL / Pro: 6.3 g/dL / ALK PHOS: 72 U/L / ALT: 72 U/L DA / AST: 44 U/L / GGT: x                 Active Medications  MEDICATIONS  (STANDING):  acetaminophen   Tablet .. 500 milliGRAM(s) Oral every 6 hours  amLODIPine   Tablet 5 milliGRAM(s) Oral daily  apixaban 2.5 milliGRAM(s) Oral every 12 hours  atorvastatin 10 milliGRAM(s) Oral at bedtime  celecoxib 200 milliGRAM(s) Oral every 12 hours  levothyroxine 50 MICROGram(s) Oral daily  metoprolol succinate ER 50 milliGRAM(s) Oral daily  oxyCODONE  ER Tablet 20 milliGRAM(s) Oral every 8 hours  pantoprazole    Tablet 40 milliGRAM(s) Oral before breakfast  polyethylene glycol 3350 17 Gram(s) Oral at bedtime  senna 2 Tablet(s) Oral at bedtime  tiZANidine 2 milliGRAM(s) Oral at bedtime  valsartan 160 milliGRAM(s) Oral daily  vortioxetine 20 milliGRAM(s) Oral at bedtime    MEDICATIONS  (PRN):  ALPRAZolam 0.25 milliGRAM(s) Oral every 12 hours PRN anxiety  bisacodyl Suppository 10 milliGRAM(s) Rectal once PRN Constipation  HYDROmorphone  Injectable 0.5 milliGRAM(s) IV Push every 3 hours PRN breakthrough pain  magnesium hydroxide Suspension 30 milliLiter(s) Oral daily PRN Constipation  oxyCODONE    IR 5 milliGRAM(s) Oral every 4 hours PRN Moderate Pain (4 - 6)  oxyCODONE    IR 10 milliGRAM(s) Oral every 4 hours PRN Severe Pain (7 - 10)

## 2021-07-23 NOTE — PROGRESS NOTE ADULT - ASSESSMENT
57 yo f pmh hypothyroidism, hld, htn, anxiety/depression, gerd is s/p Right TKR    #S/P Right TKR  Post op orders per ortho  Pain management: short acting changed to long acting oxycontin  Bowl regimen  Pt eval    #HLD  continue statins    #HTN  hold diuretics unitil pod day 2    #Hypothyroidism  levothyroxine    #Anxiety/Depression  continue home med    #GERD  PPI    #DVT proph  per ortho    #increased frequency: likely due to constipation : u/a negative.KUB ++ constipation    dispo: [facility]

## 2021-07-23 NOTE — PROGRESS NOTE ADULT - SUBJECTIVE AND OBJECTIVE BOX
TJ SAMUELS  [id number]    Pt is a 56y year old Female s/p right TKR. pain is 5/10. (+) Voids, tolerating regular diet. Denies chest pain/shortness of breath/nausea/vomitting. Patient was seen by pain management and feels that her pain is better controlled now.     Vital Signs Last 24 Hrs  T(C): 36.9 (22 Jul 2021 23:20), Max: 36.9 (22 Jul 2021 23:20)  T(F): 98.5 (22 Jul 2021 23:20), Max: 98.5 (22 Jul 2021 23:20)  HR: 88 (23 Jul 2021 06:01) (67 - 88)  BP: 105/78 (23 Jul 2021 06:01) (97/68 - 123/78)  BP(mean): --  RR: 18 (22 Jul 2021 23:20) (18 - 18)  SpO2: 93% (22 Jul 2021 23:20) (90% - 98%)    I&O's Detail                            11.1   7.69  )-----------( 287      ( 22 Jul 2021 07:44 )             34.4     07-22    138  |  104  |  8   ----------------------------<  102<H>  4.3   |  28  |  0.55    Ca    8.8      22 Jul 2021 07:43    TPro  6.3  /  Alb  2.9<L>  /  TBili  0.5  /  DBili  0.0  /  AST  44<H>  /  ALT  72<H>  /  AlkPhos  72  07-22        PE:   RLE: Dressing changed, incision clean and dry, no erythema or drainage, prineo intact SILT distally, (+2) DP/PT pulses, EHL/FHL/TA intact, Capillary refill < 2 seconds. negative calf tenderness.PAS on,    A: 56y year old Female s/p right TKR POD#3    Plan:   -DVT ppx =  apixaban 2.5 milliGRAM(s) Oral every 12 hours    -PT/OT = OOB  -Pain control   -Medicine to follow   -continue to follow Labs  -continue use of PAS  -Dispo: home planning.

## 2021-07-24 PROCEDURE — 99232 SBSQ HOSP IP/OBS MODERATE 35: CPT

## 2021-07-24 RX ORDER — ONDANSETRON 8 MG/1
4 TABLET, FILM COATED ORAL ONCE
Refills: 0 | Status: COMPLETED | OUTPATIENT
Start: 2021-07-24 | End: 2021-07-24

## 2021-07-24 RX ADMIN — APIXABAN 2.5 MILLIGRAM(S): 2.5 TABLET, FILM COATED ORAL at 21:34

## 2021-07-24 RX ADMIN — Medication 500 MILLIGRAM(S): at 06:11

## 2021-07-24 RX ADMIN — Medication 500 MILLIGRAM(S): at 17:54

## 2021-07-24 RX ADMIN — Medication 500 MILLIGRAM(S): at 06:27

## 2021-07-24 RX ADMIN — OXYCODONE HYDROCHLORIDE 10 MILLIGRAM(S): 5 TABLET ORAL at 22:37

## 2021-07-24 RX ADMIN — OXYCODONE HYDROCHLORIDE 10 MILLIGRAM(S): 5 TABLET ORAL at 12:52

## 2021-07-24 RX ADMIN — OXYCODONE HYDROCHLORIDE 10 MILLIGRAM(S): 5 TABLET ORAL at 18:26

## 2021-07-24 RX ADMIN — Medication 500 MILLIGRAM(S): at 11:18

## 2021-07-24 RX ADMIN — CELECOXIB 200 MILLIGRAM(S): 200 CAPSULE ORAL at 09:16

## 2021-07-24 RX ADMIN — OXYCODONE HYDROCHLORIDE 10 MILLIGRAM(S): 5 TABLET ORAL at 07:27

## 2021-07-24 RX ADMIN — OXYCODONE HYDROCHLORIDE 10 MILLIGRAM(S): 5 TABLET ORAL at 19:21

## 2021-07-24 RX ADMIN — CELECOXIB 200 MILLIGRAM(S): 200 CAPSULE ORAL at 09:17

## 2021-07-24 RX ADMIN — Medication 50 MILLIGRAM(S): at 06:10

## 2021-07-24 RX ADMIN — OXYCODONE HYDROCHLORIDE 20 MILLIGRAM(S): 5 TABLET ORAL at 21:33

## 2021-07-24 RX ADMIN — CELECOXIB 200 MILLIGRAM(S): 200 CAPSULE ORAL at 21:34

## 2021-07-24 RX ADMIN — OXYCODONE HYDROCHLORIDE 20 MILLIGRAM(S): 5 TABLET ORAL at 06:11

## 2021-07-24 RX ADMIN — OXYCODONE HYDROCHLORIDE 10 MILLIGRAM(S): 5 TABLET ORAL at 23:07

## 2021-07-24 RX ADMIN — TIZANIDINE 2 MILLIGRAM(S): 4 TABLET ORAL at 21:33

## 2021-07-24 RX ADMIN — PANTOPRAZOLE SODIUM 40 MILLIGRAM(S): 20 TABLET, DELAYED RELEASE ORAL at 06:10

## 2021-07-24 RX ADMIN — OXYCODONE HYDROCHLORIDE 20 MILLIGRAM(S): 5 TABLET ORAL at 17:15

## 2021-07-24 RX ADMIN — OXYCODONE HYDROCHLORIDE 10 MILLIGRAM(S): 5 TABLET ORAL at 12:05

## 2021-07-24 RX ADMIN — OXYCODONE HYDROCHLORIDE 10 MILLIGRAM(S): 5 TABLET ORAL at 06:57

## 2021-07-24 RX ADMIN — OXYCODONE HYDROCHLORIDE 20 MILLIGRAM(S): 5 TABLET ORAL at 06:49

## 2021-07-24 RX ADMIN — OXYCODONE HYDROCHLORIDE 20 MILLIGRAM(S): 5 TABLET ORAL at 16:28

## 2021-07-24 RX ADMIN — Medication 500 MILLIGRAM(S): at 18:24

## 2021-07-24 RX ADMIN — APIXABAN 2.5 MILLIGRAM(S): 2.5 TABLET, FILM COATED ORAL at 09:16

## 2021-07-24 RX ADMIN — Medication 0.25 MILLIGRAM(S): at 09:21

## 2021-07-24 RX ADMIN — VORTIOXETINE 20 MILLIGRAM(S): 5 TABLET, FILM COATED ORAL at 21:33

## 2021-07-24 RX ADMIN — Medication 50 MICROGRAM(S): at 06:11

## 2021-07-24 RX ADMIN — ONDANSETRON 4 MILLIGRAM(S): 8 TABLET, FILM COATED ORAL at 03:49

## 2021-07-24 RX ADMIN — ATORVASTATIN CALCIUM 10 MILLIGRAM(S): 80 TABLET, FILM COATED ORAL at 21:34

## 2021-07-24 NOTE — PROGRESS NOTE ADULT - SUBJECTIVE AND OBJECTIVE BOX
SUBJECTIVE: Patient seen and examined. No nausea/vomiting, nor shortness of breath, but she is still complaining of pain and that she is not ready to leave    OBJECTIVE:     Vital Signs Last 24 Hrs  T(C): 37.3 (24 Jul 2021 09:37), Max: 37.3 (23 Jul 2021 11:17)  T(F): 99.1 (24 Jul 2021 09:37), Max: 99.1 (23 Jul 2021 11:17)  HR: 79 (24 Jul 2021 09:37) (74 - 86)  BP: 105/75 (24 Jul 2021 09:37) (90/58 - 116/74)  BP(mean): --  RR: 16 (24 Jul 2021 09:37) (16 - 19)  SpO2: 95% (24 Jul 2021 09:37) (92% - 95%)    PAIN SCORE:    7     SCALE USED: (1-10 VNRS)        Affected extremity:          Dressing: clean/dry/intact surgical incision with prineo closure. No surrounding erythema nor any prineo reaction. Skin with good integrity, no other lesions are seen.         Sensation:  intact to light touch, nontender thigh and supple to palpation, not tense.         Motor exam:          5/ 5 Tibialis Anterior/Gastrocnemius-Soleus , EHL         warm well perfused; capillary refill <3 seconds     LABS:            MEDICATIONS:    Anticoagulation:  apixaban 2.5 milliGRAM(s) Oral every 12 hours      Antibiotics:       Pain medications:   acetaminophen   Tablet .. 500 milliGRAM(s) Oral every 6 hours  ALPRAZolam 0.25 milliGRAM(s) Oral every 12 hours PRN  celecoxib 200 milliGRAM(s) Oral every 12 hours  HYDROmorphone  Injectable 0.5 milliGRAM(s) IV Push every 3 hours PRN  oxyCODONE    IR 5 milliGRAM(s) Oral every 4 hours PRN  oxyCODONE    IR 10 milliGRAM(s) Oral every 4 hours PRN  oxyCODONE  ER Tablet 20 milliGRAM(s) Oral every 8 hours  tiZANidine 2 milliGRAM(s) Oral at bedtime  vortioxetine 20 milliGRAM(s) Oral at bedtime      A/P :  s/p Right TKR   POD # 4  -    Pain control  -    DVT ppx: Eliquis  -    Check AM labs  -    Weight bearing status: WBAT   -    Physical Therapy  -    Dispo: [facility], pending authorization

## 2021-07-25 PROCEDURE — 99232 SBSQ HOSP IP/OBS MODERATE 35: CPT

## 2021-07-25 RX ADMIN — Medication 50 MICROGRAM(S): at 06:36

## 2021-07-25 RX ADMIN — Medication 500 MILLIGRAM(S): at 17:37

## 2021-07-25 RX ADMIN — Medication 500 MILLIGRAM(S): at 07:04

## 2021-07-25 RX ADMIN — CELECOXIB 200 MILLIGRAM(S): 200 CAPSULE ORAL at 08:43

## 2021-07-25 RX ADMIN — ATORVASTATIN CALCIUM 10 MILLIGRAM(S): 80 TABLET, FILM COATED ORAL at 21:32

## 2021-07-25 RX ADMIN — Medication 0.25 MILLIGRAM(S): at 09:57

## 2021-07-25 RX ADMIN — SENNA PLUS 2 TABLET(S): 8.6 TABLET ORAL at 21:32

## 2021-07-25 RX ADMIN — Medication 500 MILLIGRAM(S): at 13:18

## 2021-07-25 RX ADMIN — APIXABAN 2.5 MILLIGRAM(S): 2.5 TABLET, FILM COATED ORAL at 08:44

## 2021-07-25 RX ADMIN — CELECOXIB 200 MILLIGRAM(S): 200 CAPSULE ORAL at 21:39

## 2021-07-25 RX ADMIN — OXYCODONE HYDROCHLORIDE 20 MILLIGRAM(S): 5 TABLET ORAL at 23:10

## 2021-07-25 RX ADMIN — Medication 500 MILLIGRAM(S): at 06:35

## 2021-07-25 RX ADMIN — OXYCODONE HYDROCHLORIDE 20 MILLIGRAM(S): 5 TABLET ORAL at 06:35

## 2021-07-25 RX ADMIN — OXYCODONE HYDROCHLORIDE 20 MILLIGRAM(S): 5 TABLET ORAL at 22:39

## 2021-07-25 RX ADMIN — PANTOPRAZOLE SODIUM 40 MILLIGRAM(S): 20 TABLET, DELAYED RELEASE ORAL at 06:36

## 2021-07-25 RX ADMIN — POLYETHYLENE GLYCOL 3350 17 GRAM(S): 17 POWDER, FOR SOLUTION ORAL at 21:34

## 2021-07-25 RX ADMIN — Medication 500 MILLIGRAM(S): at 17:34

## 2021-07-25 RX ADMIN — OXYCODONE HYDROCHLORIDE 10 MILLIGRAM(S): 5 TABLET ORAL at 18:04

## 2021-07-25 RX ADMIN — OXYCODONE HYDROCHLORIDE 10 MILLIGRAM(S): 5 TABLET ORAL at 17:34

## 2021-07-25 RX ADMIN — Medication 0.25 MILLIGRAM(S): at 22:00

## 2021-07-25 RX ADMIN — Medication 50 MILLIGRAM(S): at 06:37

## 2021-07-25 RX ADMIN — OXYCODONE HYDROCHLORIDE 10 MILLIGRAM(S): 5 TABLET ORAL at 03:21

## 2021-07-25 RX ADMIN — CELECOXIB 200 MILLIGRAM(S): 200 CAPSULE ORAL at 08:47

## 2021-07-25 RX ADMIN — OXYCODONE HYDROCHLORIDE 10 MILLIGRAM(S): 5 TABLET ORAL at 09:15

## 2021-07-25 RX ADMIN — Medication 500 MILLIGRAM(S): at 13:19

## 2021-07-25 RX ADMIN — OXYCODONE HYDROCHLORIDE 20 MILLIGRAM(S): 5 TABLET ORAL at 14:37

## 2021-07-25 RX ADMIN — APIXABAN 2.5 MILLIGRAM(S): 2.5 TABLET, FILM COATED ORAL at 21:32

## 2021-07-25 RX ADMIN — OXYCODONE HYDROCHLORIDE 10 MILLIGRAM(S): 5 TABLET ORAL at 02:51

## 2021-07-25 RX ADMIN — CELECOXIB 200 MILLIGRAM(S): 200 CAPSULE ORAL at 21:32

## 2021-07-25 RX ADMIN — OXYCODONE HYDROCHLORIDE 10 MILLIGRAM(S): 5 TABLET ORAL at 08:44

## 2021-07-25 RX ADMIN — VORTIOXETINE 20 MILLIGRAM(S): 5 TABLET, FILM COATED ORAL at 22:00

## 2021-07-25 RX ADMIN — OXYCODONE HYDROCHLORIDE 20 MILLIGRAM(S): 5 TABLET ORAL at 07:04

## 2021-07-25 NOTE — PROGRESS NOTE ADULT - SUBJECTIVE AND OBJECTIVE BOX
patient seen and examined and examined at bedside  no acute overnight events  patient feels  denies feverchills nausea  Review of Systems:  General:denies fever chills, headache, weakness  HEENT: denies blurry vision,diffculty swallowing, difficulty hearing, tinnitus  Cardiovascular: denies chest pain  ,palpitations  Pulmonary:denies shortness of breath, cough, wheezing, hemoptysis  Gastrointestinal: denies abdominal pain, constipation, diarrhea,nausea , vomiting, hematochezia  : denies hematuria, dysuria, or incontinence  Neurological: denies weakness, numbness , tingling, dizziness, tremors  MSK: denies muscle pain, difficulty ambulating, swelling, back pain  skin: denies skin rash, itching, burning, or  skin lesions  Psychiatrical: denies mood disturbances, anxierty, feeling depressed, depression , or difficulty sleeping    Objective:  Vitals  T(C): 36.4 (07-25-21 @ 07:35), Max: 37.1 (07-24-21 @ 16:56)  HR: 91 (07-25-21 @ 07:35) (76 - 91)  BP: 114/79 (07-25-21 @ 07:35) (104/70 - 125/87)  RR: 16 (07-25-21 @ 07:35) (16 - 16)  SpO2: 94% (07-25-21 @ 07:35) (94% - 96%)    Physical Exam:  General: comfortable, no acute distress, well nourished  HEENT: Atraumatic, no LAD, trachea midline, PERRLA  Cardiovascular: normal s1s2, no murmurs, gallops or fricition rubs  Pulmonary: clear to ausculation Bilaterally, no wheezing , rhonchi  Gastrointestinal: soft non tender non distended, no masses felt, no organomegally  Muscloskeletal: no lower extremity edema, intact bilateral lower extremity pulses  Neurological: CN II-12 intact. No focal weakness  Psychiatrical: normal mood, cooperative  SKIN: no rash, lesions or ulcers    Labs:                    Active Medications  MEDICATIONS  (STANDING):  acetaminophen   Tablet .. 500 milliGRAM(s) Oral every 6 hours  amLODIPine   Tablet 5 milliGRAM(s) Oral daily  apixaban 2.5 milliGRAM(s) Oral every 12 hours  atorvastatin 10 milliGRAM(s) Oral at bedtime  celecoxib 200 milliGRAM(s) Oral every 12 hours  levothyroxine 50 MICROGram(s) Oral daily  metoprolol succinate ER 50 milliGRAM(s) Oral daily  oxyCODONE  ER Tablet 20 milliGRAM(s) Oral every 8 hours  pantoprazole    Tablet 40 milliGRAM(s) Oral before breakfast  polyethylene glycol 3350 17 Gram(s) Oral at bedtime  senna 2 Tablet(s) Oral at bedtime  valsartan 160 milliGRAM(s) Oral daily  vortioxetine 20 milliGRAM(s) Oral at bedtime    MEDICATIONS  (PRN):  ALPRAZolam 0.25 milliGRAM(s) Oral every 12 hours PRN anxiety  HYDROmorphone  Injectable 0.5 milliGRAM(s) IV Push every 3 hours PRN breakthrough pain  magnesium hydroxide Suspension 30 milliLiter(s) Oral daily PRN Constipation  oxyCODONE    IR 5 milliGRAM(s) Oral every 4 hours PRN Moderate Pain (4 - 6)  oxyCODONE    IR 10 milliGRAM(s) Oral every 4 hours PRN Severe Pain (7 - 10)

## 2021-07-25 NOTE — DIETITIAN INITIAL EVALUATION ADULT. - OTHER INFO
Pt is a 55 yo female with  Hx hypothyroidism, hld, htn, anxiety/depression, gerd  who is s/p Right TKR. Noted gluten listed under allergy. Pt is a 57 yo female with  Hx hypothyroidism, hld, htn, anxiety/depression, gerd  who is s/p Right TKR. Noted gluten listed under allergy. Clarified gluten allergy with pt. She is intolerant not allergic and will have blister/rash on elbows. Condition rare and recently dx ed by dermatologist who suggested gluten free diet. Pt was not following gluten free diet PTA. Medical Hx indicates "gluten intolerant" and diet rx is regular. Pt states she has slight redness on arms but not bothersome enough to want to eat gluten free at this time. Pt tolerating meals and can self-select from options.

## 2021-07-25 NOTE — PROGRESS NOTE ADULT - SUBJECTIVE AND OBJECTIVE BOX
ORTHOPEDIC PA PROGRESS NOTE  TJ SAMUELS      56y Female                                 SY 2WST 219 02                                                                                                                           POD #    5d    STATUS POST:       Procedure: Right total knee replacement               Patient seen and examined at bedside.      Current Pain Management:    acetaminophen   Tablet .. 500 milliGRAM(s) Oral every 6 hours  ALPRAZolam 0.25 milliGRAM(s) Oral every 12 hours PRN  celecoxib 200 milliGRAM(s) Oral every 12 hours  HYDROmorphone  Injectable 0.5 milliGRAM(s) IV Push every 3 hours PRN  oxyCODONE    IR 5 milliGRAM(s) Oral every 4 hours PRN  oxyCODONE    IR 10 milliGRAM(s) Oral every 4 hours PRN  oxyCODONE  ER Tablet 20 milliGRAM(s) Oral every 8 hours  vortioxetine 20 milliGRAM(s) Oral at bedtime      T(F): 97.6  HR: 91  BP: 114/79  RR: 16  SpO2: 94%               Physical Exam :    -   Wound open to air w prineo C/D/I.   -   Distal Neurvascular status intact grossly.   -   Warm well perfused; capillary refill <3 seconds   -   (+)EHL/FHL   -   (+) Sensation to light touch  -   (-) Calf tenderness Bilaterally      A/P: 56y Female s/p Right total knee replacement       -   Ortho Stable  -   Pain control:  acetaminophen   Tablet .. 500 milliGRAM(s) Oral every 6 hours  ALPRAZolam 0.25 milliGRAM(s) Oral every 12 hours PRN  celecoxib 200 milliGRAM(s) Oral every 12 hours  HYDROmorphone  Injectable 0.5 milliGRAM(s) IV Push every 3 hours PRN  oxyCODONE    IR 5 milliGRAM(s) Oral every 4 hours PRN  oxyCODONE    IR 10 milliGRAM(s) Oral every 4 hours PRN  oxyCODONE  ER Tablet 20 milliGRAM(s) Oral every 8 hours  vortioxetine 20 milliGRAM(s) Oral at bedtime    -   Medicine to follow  -   DVT ppx:    PAS +  apixaban: 2.5 milliGRAM(s) Oral, apixaban: 2.5 milliGRAM(s) Oral  -   PT/OT OOB,  Weight bearing status: WBAT   -  Dispo:  [facility] Monday  -   Prescribed Medications:  3-1 Commode: s/p Right TKR  apixaban 2.5 mg oral tablet: 1 tab(s) orally every 12 hours. Switch to Aspirin once Eliquis is completed.   CeleBREX 200 mg oral capsule: 1 cap(s) orally every 12 hours. Take at least 2 hours after Aspirin.   Ecotrin Adult Low Strength 81 mg oral delayed release tablet: 1 tab(s) orally every 12 hours. Take at least 2 hours before Celebrex.   oxyCODONE 20 mg oral tablet, extended release: 1 tab(s) orally every 8 hours MDD:3  oxyCODONE 5 mg oral tablet: 1-2 tab(s) orally every 4 hours, As Needed -for severe pain MDD:6  rolling walker with 5 in wheels: Dx: Right TKR

## 2021-07-26 PROCEDURE — 99232 SBSQ HOSP IP/OBS MODERATE 35: CPT

## 2021-07-26 PROCEDURE — 99233 SBSQ HOSP IP/OBS HIGH 50: CPT

## 2021-07-26 RX ORDER — CAFFEINE 200 MG
200 TABLET ORAL ONCE
Refills: 0 | Status: COMPLETED | OUTPATIENT
Start: 2021-07-26 | End: 2021-07-26

## 2021-07-26 RX ORDER — ACETAMINOPHEN 500 MG
500 TABLET ORAL ONCE
Refills: 0 | Status: COMPLETED | OUTPATIENT
Start: 2021-07-26 | End: 2021-07-26

## 2021-07-26 RX ORDER — OXYCODONE HYDROCHLORIDE 5 MG/1
1 TABLET ORAL
Qty: 0 | Refills: 0 | DISCHARGE
Start: 2021-07-26

## 2021-07-26 RX ORDER — SUMATRIPTAN SUCCINATE 4 MG/.5ML
25 INJECTION, SOLUTION SUBCUTANEOUS ONCE
Refills: 0 | Status: DISCONTINUED | OUTPATIENT
Start: 2021-07-26 | End: 2021-07-26

## 2021-07-26 RX ORDER — ASPIRIN/CALCIUM CARB/MAGNESIUM 324 MG
325 TABLET ORAL ONCE
Refills: 0 | Status: COMPLETED | OUTPATIENT
Start: 2021-07-26 | End: 2021-07-26

## 2021-07-26 RX ORDER — APIXABAN 2.5 MG/1
1 TABLET, FILM COATED ORAL
Qty: 0 | Refills: 0 | DISCHARGE
Start: 2021-07-26

## 2021-07-26 RX ORDER — ONDANSETRON 8 MG/1
4 TABLET, FILM COATED ORAL EVERY 6 HOURS
Refills: 0 | Status: DISCONTINUED | OUTPATIENT
Start: 2021-07-26 | End: 2021-07-27

## 2021-07-26 RX ADMIN — Medication 500 MILLIGRAM(S): at 19:00

## 2021-07-26 RX ADMIN — Medication 50 MILLIGRAM(S): at 06:14

## 2021-07-26 RX ADMIN — AMLODIPINE BESYLATE 5 MILLIGRAM(S): 2.5 TABLET ORAL at 06:14

## 2021-07-26 RX ADMIN — Medication 500 MILLIGRAM(S): at 19:30

## 2021-07-26 RX ADMIN — OXYCODONE HYDROCHLORIDE 20 MILLIGRAM(S): 5 TABLET ORAL at 06:55

## 2021-07-26 RX ADMIN — OXYCODONE HYDROCHLORIDE 20 MILLIGRAM(S): 5 TABLET ORAL at 06:54

## 2021-07-26 RX ADMIN — Medication 500 MILLIGRAM(S): at 12:36

## 2021-07-26 RX ADMIN — Medication 325 MILLIGRAM(S): at 12:36

## 2021-07-26 RX ADMIN — VORTIOXETINE 20 MILLIGRAM(S): 5 TABLET, FILM COATED ORAL at 22:36

## 2021-07-26 RX ADMIN — Medication 500 MILLIGRAM(S): at 06:14

## 2021-07-26 RX ADMIN — APIXABAN 2.5 MILLIGRAM(S): 2.5 TABLET, FILM COATED ORAL at 21:09

## 2021-07-26 RX ADMIN — Medication 200 MILLIGRAM(S): at 12:36

## 2021-07-26 RX ADMIN — OXYCODONE HYDROCHLORIDE 20 MILLIGRAM(S): 5 TABLET ORAL at 14:59

## 2021-07-26 RX ADMIN — Medication 500 MILLIGRAM(S): at 01:05

## 2021-07-26 RX ADMIN — OXYCODONE HYDROCHLORIDE 10 MILLIGRAM(S): 5 TABLET ORAL at 21:13

## 2021-07-26 RX ADMIN — Medication 500 MILLIGRAM(S): at 12:37

## 2021-07-26 RX ADMIN — PANTOPRAZOLE SODIUM 40 MILLIGRAM(S): 20 TABLET, DELAYED RELEASE ORAL at 06:14

## 2021-07-26 RX ADMIN — Medication 50 MICROGRAM(S): at 06:14

## 2021-07-26 RX ADMIN — POLYETHYLENE GLYCOL 3350 17 GRAM(S): 17 POWDER, FOR SOLUTION ORAL at 21:09

## 2021-07-26 RX ADMIN — VALSARTAN 160 MILLIGRAM(S): 80 TABLET ORAL at 06:14

## 2021-07-26 RX ADMIN — OXYCODONE HYDROCHLORIDE 20 MILLIGRAM(S): 5 TABLET ORAL at 22:36

## 2021-07-26 RX ADMIN — OXYCODONE HYDROCHLORIDE 10 MILLIGRAM(S): 5 TABLET ORAL at 01:40

## 2021-07-26 RX ADMIN — ATORVASTATIN CALCIUM 10 MILLIGRAM(S): 80 TABLET, FILM COATED ORAL at 21:09

## 2021-07-26 RX ADMIN — ONDANSETRON 4 MILLIGRAM(S): 8 TABLET, FILM COATED ORAL at 09:33

## 2021-07-26 RX ADMIN — APIXABAN 2.5 MILLIGRAM(S): 2.5 TABLET, FILM COATED ORAL at 11:07

## 2021-07-26 RX ADMIN — OXYCODONE HYDROCHLORIDE 10 MILLIGRAM(S): 5 TABLET ORAL at 21:45

## 2021-07-26 RX ADMIN — OXYCODONE HYDROCHLORIDE 20 MILLIGRAM(S): 5 TABLET ORAL at 23:00

## 2021-07-26 RX ADMIN — SENNA PLUS 2 TABLET(S): 8.6 TABLET ORAL at 21:09

## 2021-07-26 RX ADMIN — Medication 10 MILLIGRAM(S): at 12:47

## 2021-07-26 RX ADMIN — CELECOXIB 200 MILLIGRAM(S): 200 CAPSULE ORAL at 21:20

## 2021-07-26 RX ADMIN — OXYCODONE HYDROCHLORIDE 20 MILLIGRAM(S): 5 TABLET ORAL at 14:58

## 2021-07-26 RX ADMIN — OXYCODONE HYDROCHLORIDE 10 MILLIGRAM(S): 5 TABLET ORAL at 01:05

## 2021-07-26 RX ADMIN — Medication 500 MILLIGRAM(S): at 06:17

## 2021-07-26 RX ADMIN — Medication 500 MILLIGRAM(S): at 01:30

## 2021-07-26 RX ADMIN — CELECOXIB 200 MILLIGRAM(S): 200 CAPSULE ORAL at 21:09

## 2021-07-26 NOTE — PROGRESS NOTE ADULT - TIME BILLING
The total amount of time listed was spent reviewing the hospital notes, laboratory values, imaging findings, assessing/counseling the patient, discussing with consultant physicians, case management, social work, and nursing staff.

## 2021-07-26 NOTE — PROGRESS NOTE ADULT - ASSESSMENT
57 yo f pmh hypothyroidism, hld, htn, anxiety/depression, gerd is s/p Right TKR    #S/P Right TKR  Post op orders per ortho  Pain management: short acting changed to long acting oxycontin  D/w palliatve, can taper pain regimen at rehab  Bowl regimen  Pt eval appreciated- [facility]- pending placement    #headache  has hx of migranes with nausea, typically resoves with coffee or Excedrin  Caffine tab ordered  D/w palliative, can give Imitrex if still symptomatic      #HLD  continue statins    #HTN  hold diuretics unitil pod day 2    #Hypothyroidism  levothyroxine    #Anxiety/Depression  continue home med    #GERD  PPI    #DVT proph  per ortho    #increased frequency: likely due to constipation : u/a negative.KUB ++ constipation  - Dulcolax suppository PRN    dispo: [facility]

## 2021-07-26 NOTE — PROGRESS NOTE ADULT - SUBJECTIVE AND OBJECTIVE BOX
TJ SAMUELS  [id number]    Pt is a 56y year old Female s/p right TKR. pain is 5/10. (+) Voids, tolerating regular diet. admits nausea due to pain, she notes that she tweaked her knee last night but did not fall. Denies chest pain/shortness of breath/vomitting.     Vital Signs Last 24 Hrs  T(C): 37.2 (26 Jul 2021 07:46), Max: 37.2 (26 Jul 2021 07:46)  T(F): 99 (26 Jul 2021 07:46), Max: 99 (26 Jul 2021 07:46)  HR: 82 (26 Jul 2021 07:46) (74 - 82)  BP: 117/80 (26 Jul 2021 07:46) (112/74 - 126/83)  BP(mean): --  RR: 18 (26 Jul 2021 07:46) (16 - 18)  SpO2: 96% (26 Jul 2021 07:46) (94% - 96%)    I&O's Detail                  PE:   RLE: Dressing changed, incision clean and dry, no erythema or drainage, prineo intact SILT distally, (+2) DP/PT pulses, EHL/FHL/TA intact, Capillary refill < 2 seconds. negative calf tenderness.PAS on,    A: 56y year old Female s/p right TKR POD#6    Plan:   -DVT ppx =  apixaban 2.5 milliGRAM(s) Oral every 12 hours    -PT/OT = OOB  -Pain control   -Medicine to follow   -continue to follow Labs  -continue use of PAS  -Dispo: [facility] planning

## 2021-07-26 NOTE — PROGRESS NOTE ADULT - SUBJECTIVE AND OBJECTIVE BOX
SUBJECTIVE AND OBJECTIVE:  INTERVAL HPI/OVERNIGHT EVENTS:    DNR on chart:   Allergies    Gluten (Other)  No Known Drug Allergies    Intolerances    MEDICATIONS  (STANDING):  acetaminophen   Tablet .. 500 milliGRAM(s) Oral every 6 hours  amLODIPine   Tablet 5 milliGRAM(s) Oral daily  apixaban 2.5 milliGRAM(s) Oral every 12 hours  atorvastatin 10 milliGRAM(s) Oral at bedtime  celecoxib 200 milliGRAM(s) Oral every 12 hours  levothyroxine 50 MICROGram(s) Oral daily  metoprolol succinate ER 50 milliGRAM(s) Oral daily  oxyCODONE  ER Tablet 20 milliGRAM(s) Oral every 8 hours  pantoprazole    Tablet 40 milliGRAM(s) Oral before breakfast  polyethylene glycol 3350 17 Gram(s) Oral at bedtime  senna 2 Tablet(s) Oral at bedtime  valsartan 160 milliGRAM(s) Oral daily  vortioxetine 20 milliGRAM(s) Oral at bedtime    MEDICATIONS  (PRN):  ALPRAZolam 0.25 milliGRAM(s) Oral every 12 hours PRN anxiety  HYDROmorphone  Injectable 0.5 milliGRAM(s) IV Push every 3 hours PRN breakthrough pain  magnesium hydroxide Suspension 30 milliLiter(s) Oral daily PRN Constipation  ondansetron Injectable 4 milliGRAM(s) IV Push every 6 hours PRN Nausea and/or Vomiting  oxyCODONE    IR 10 milliGRAM(s) Oral every 4 hours PRN Severe Pain (7 - 10)  oxyCODONE    IR 5 milliGRAM(s) Oral every 4 hours PRN Moderate Pain (4 - 6)      ITEMS UNCHECKED ARE NOT PRESENT    PRESENT SYMPTOMS: [ ]Unable to obtain due to poor mentation   Source if other than patient:  [ ]Family   [ ]Team     Pain:  [ ]yes [ ]no  QOL impact -   Location -                    Aggravating factors -  Quality -  Radiation -  Timing-  Severity (0-10 scale):  Minimal acceptable level (0-10 scale):     Dyspnea:                           [ ]Mild [ ]Moderate [ ]Severe  Anxiety:                             [ ]Mild [ ]Moderate [ ]Severe  Fatigue:                             [ ]Mild [ ]Moderate [ ]Severe  Nausea:                             [ ]Mild [ ]Moderate [ ]Severe  Loss of appetite:              [ ]Mild [ ]Moderate [ ]Severe  Constipation:                    [ ]Mild [ ]Moderate [ ]Severe    CPOT:    https://www.[facility].org/getattachment/[email or web address]/Critical-Care-Pain-Observation-Tool-(CPOT)    PAIN AD Score:	  http://geriatrictoolkit.[place].[facility]/cog/painad.pdf (Ctrl + left click to view)    Other Symptoms:  [ ]All other review of systems negative     Palliative Performance Status Version 2:         %      http://npcrc.org/files/news/palliative_performance_scale_ppsv2.pdf  PHYSICAL EXAM:  Vital Signs Last 24 Hrs  T(C): 37.2 (26 Jul 2021 07:46), Max: 37.2 (26 Jul 2021 07:46)  T(F): 99 (26 Jul 2021 07:46), Max: 99 (26 Jul 2021 07:46)  HR: 82 (26 Jul 2021 07:46) (74 - 82)  BP: 117/80 (26 Jul 2021 07:46) (112/74 - 126/83)  BP(mean): --  RR: 18 (26 Jul 2021 07:46) (16 - 18)  SpO2: 96% (26 Jul 2021 07:46) (94% - 96%) I&O's Summary     GENERAL:  [ ]Alert  [ ]Oriented x   [ ]Lethargic  [ ]Cachexia  [ ]Unarousable  [ ]Verbal  [ ]Non-Verbal  Behavioral:   [ ]Anxiety  [ ]Delirium [ ]Agitation [ ]Other  HEENT:  [ ]Normal   [ ]Dry mouth   [ ]ET Tube/Trach  [ ]Oral lesions  PULMONARY:   [ ]Clear [ ]Tachypnea  [ ]Audible excessive secretions   [ ]Rhonchi        [ ]Right [ ]Left [ ]Bilateral  [ ]Crackles        [ ]Right [ ]Left [ ]Bilateral  [ ]Wheezing     [ ]Right [ ]Left [ ]Bilateral  [ ]Diminished BS [ ] Right [ ]Left [ ]Bilateral  CARDIOVASCULAR:    [ ]Regular [ ]Irregular [ ]Tachy  [ ][place] [ ]Murmur [ ]Other  GASTROINTESTINAL:  [ ]Soft  [ ]Distended   [ ]+BS  [ ]Non tender [ ]Tender  [ ]PEG [ ]OGT/ NGT   Last BM:    GENITOURINARY:  [ ]Normal [ ]Incontinent   [ ]Oliguria/Anuria   [ ][place]  MUSCULOSKELETAL:   [ ]Normal   [ ]Weakness  [ ]Bed/Wheelchair bound [ ]Edema  NEUROLOGIC:   [ ]No focal deficits  [ ] Cognitive impairment  [ ] Dysphagia [ ]Dysarthria [ ] Paresis [ ]Other   SKIN:   [ ]Normal  [ ]Rash   [ ]Pressure ulcer(s) [ ]y [ ]n present on admission    CRITICAL CARE:  [ ]Shock Present  [ ]Septic [ ]Cardiogenic [ ]Neurologic [ ]Hypovolemic  [ ]Vasopressors [ ]Inotropes  [ ]Respiratory failure present [ ]Mechanical Ventilation [ ]Non-invasive ventilatory support [ ]High-Flow   [ ]Acute  [ ]Chronic [ ]Hypoxic  [ ]Hypercarbic [ ]Other  [ ]Other organ failure     LABS:            RADIOLOGY & ADDITIONAL STUDIES: reviewed    Protein Calorie Malnutrition Present: [ ]mild [ ]moderate [ ]severe [ ]underweight [ ]morbid obesity  https://www.andeal.org/vault/2440/web/files/ONC/Table_Clinical%20Characteristics%20to%20Document%20Malnutrition-White%20JV%20et%20al%160937.pdf    Height (cm): 168.9 (07-20-21 @ 10:12), 167.6 (07-06-21 @ 10:28)  Weight (kg): 84.5 (07-20-21 @ 10:12), 83.9 (07-06-21 @ 10:28)  BMI (kg/m2): 29.6 (07-20-21 @ 10:12), 29.9 (07-06-21 @ 10:28)    [ ]PPSV2 < or = 30%  [ ]significant weight loss [ ]poor nutritional intake [ ]anasarca    [ ]Artificial Nutrition    REFERRALS:   [ ]Chaplaincy  [ ]Hospice  [ ]Child Life  [ ]Social Work  [ ]Case management [ ]Holistic Therapy     Goals of Care Document:     SUBJECTIVE AND OBJECTIVE/INTERVAL HPI/OVERNIGHT EVENTS:  - no acute events overnight  - c/o nausea & vomited x1 2/2 migraine. Takes Excedrin migraine    DNR on chart:   Allergies    Gluten (Other)  No Known Drug Allergies    Intolerances    MEDICATIONS  (STANDING):  acetaminophen   Tablet .. 500 milliGRAM(s) Oral every 6 hours  amLODIPine   Tablet 5 milliGRAM(s) Oral daily  apixaban 2.5 milliGRAM(s) Oral every 12 hours  atorvastatin 10 milliGRAM(s) Oral at bedtime  celecoxib 200 milliGRAM(s) Oral every 12 hours  levothyroxine 50 MICROGram(s) Oral daily  metoprolol succinate ER 50 milliGRAM(s) Oral daily  oxyCODONE  ER Tablet 20 milliGRAM(s) Oral every 8 hours  pantoprazole    Tablet 40 milliGRAM(s) Oral before breakfast  polyethylene glycol 3350 17 Gram(s) Oral at bedtime  senna 2 Tablet(s) Oral at bedtime  valsartan 160 milliGRAM(s) Oral daily  vortioxetine 20 milliGRAM(s) Oral at bedtime    MEDICATIONS  (PRN):  ALPRAZolam 0.25 milliGRAM(s) Oral every 12 hours PRN anxiety  HYDROmorphone  Injectable 0.5 milliGRAM(s) IV Push every 3 hours PRN breakthrough pain  magnesium hydroxide Suspension 30 milliLiter(s) Oral daily PRN Constipation  ondansetron Injectable 4 milliGRAM(s) IV Push every 6 hours PRN Nausea and/or Vomiting  oxyCODONE    IR 10 milliGRAM(s) Oral every 4 hours PRN Severe Pain (7 - 10)  oxyCODONE    IR 5 milliGRAM(s) Oral every 4 hours PRN Moderate Pain (4 - 6)      ITEMS UNCHECKED ARE NOT PRESENT    PRESENT SYMPTOMS: [ ]Unable to obtain due to poor mentation   Source if other than patient:  [ ]Family   [ ]Team     Pain:  [ ]yes [ ]no  QOL impact -   Location -                    Aggravating factors -  Quality -  Radiation -  Timing-  Severity (0-10 scale):  Minimal acceptable level (0-10 scale):     Dyspnea:                           [ ]Mild [ ]Moderate [ ]Severe  Anxiety:                             [ ]Mild [ ]Moderate [ ]Severe  Fatigue:                             [ ]Mild [ ]Moderate [ ]Severe  Nausea:                             [ ]Mild [ ]Moderate [ ]Severe  Loss of appetite:              [ ]Mild [ ]Moderate [ ]Severe  Constipation:                    [ ]Mild [ ]Moderate [ ]Severe    CPOT:    https://www.[facility].org/getattachment/[email or web address]/Critical-Care-Pain-Observation-Tool-(CPOT)    PAIN AD Score:	  http://geriatrictoolkit.[facility]/cog/painad.pdf (Ctrl + left click to view)    Other Symptoms:  [x ]All other review of systems negative     Palliative Performance Status Version 2:         %      http://npcrc.org/files/news/palliative_performance_scale_ppsv2.pdf  PHYSICAL EXAM:  Vital Signs Last 24 Hrs  T(C): 37.2 (26 Jul 2021 07:46), Max: 37.2 (26 Jul 2021 07:46)  T(F): 99 (26 Jul 2021 07:46), Max: 99 (26 Jul 2021 07:46)  HR: 82 (26 Jul 2021 07:46) (74 - 82)  BP: 117/80 (26 Jul 2021 07:46) (112/74 - 126/83)  BP(mean): --  RR: 18 (26 Jul 2021 07:46) (16 - 18)  SpO2: 96% (26 Jul 2021 07:46) (94% - 96%) I&O's Summary       LABS:            RADIOLOGY & ADDITIONAL STUDIES: reviewed    Protein Calorie Malnutrition Present: [ ]mild [ ]moderate [ ]severe [ ]underweight [ ]morbid obesity  https://www.andeal.org/vault/2440/web/files/ONC/Table_Clinical%20Characteristics%20to%20Document%20Malnutrition-White%20JV%20et%20al%202012.pdf    Height (cm): 168.9 (07-20-21 @ 10:12), 167.6 (07-06-21 @ 10:28)  Weight (kg): 84.5 (07-20-21 @ 10:12), 83.9 (07-06-21 @ 10:28)  BMI (kg/m2): 29.6 (07-20-21 @ 10:12), 29.9 (07-06-21 @ 10:28)    [ ]PPSV2 < or = 30%  [ ]significant weight loss [ ]poor nutritional intake [ ]anasarca    [ ]Artificial Nutrition    REFERRALS:   [ ]Chaplaincy  [ ]Hospice  [ ]Child Life  [ ]Social Work  [ ]Case management [ ]Holistic Therapy     Goals of Care Document:     SUBJECTIVE AND OBJECTIVE/INTERVAL HPI/OVERNIGHT EVENTS:  - no acute events overnight  - c/o nausea & vomited x1 2/2 migraine. Takes Excedrin migraine    DNR on chart:   Allergies    Gluten (Other)  No Known Drug Allergies    Intolerances    MEDICATIONS  (STANDING):  acetaminophen   Tablet .. 500 milliGRAM(s) Oral every 6 hours  amLODIPine   Tablet 5 milliGRAM(s) Oral daily  apixaban 2.5 milliGRAM(s) Oral every 12 hours  atorvastatin 10 milliGRAM(s) Oral at bedtime  celecoxib 200 milliGRAM(s) Oral every 12 hours  levothyroxine 50 MICROGram(s) Oral daily  metoprolol succinate ER 50 milliGRAM(s) Oral daily  oxyCODONE  ER Tablet 20 milliGRAM(s) Oral every 8 hours  pantoprazole    Tablet 40 milliGRAM(s) Oral before breakfast  polyethylene glycol 3350 17 Gram(s) Oral at bedtime  senna 2 Tablet(s) Oral at bedtime  valsartan 160 milliGRAM(s) Oral daily  vortioxetine 20 milliGRAM(s) Oral at bedtime    MEDICATIONS  (PRN):  ALPRAZolam 0.25 milliGRAM(s) Oral every 12 hours PRN anxiety  HYDROmorphone  Injectable 0.5 milliGRAM(s) IV Push every 3 hours PRN breakthrough pain  magnesium hydroxide Suspension 30 milliLiter(s) Oral daily PRN Constipation  ondansetron Injectable 4 milliGRAM(s) IV Push every 6 hours PRN Nausea and/or Vomiting  oxyCODONE    IR 10 milliGRAM(s) Oral every 4 hours PRN Severe Pain (7 - 10)  oxyCODONE    IR 5 milliGRAM(s) Oral every 4 hours PRN Moderate Pain (4 - 6)  Pain: [ x]yes [ ]no  QOL impact - yes   Location -     R knee               Aggravating factors - movement   Quality - sharp  Radiation - no  Timing- constant  Severity (0-10 scale): 5/10  Minimal acceptable level (0-10 scale): 3/10    ITEMS UNCHECKED ARE NOT PRESENT    PRESENT SYMPTOMS: [ ]Unable to obtain due to poor mentation   Source if other than patient:  [ ]Family   [ ]Team       Dyspnea:                           [ ]Mild [ ]Moderate [ ]Severe  Anxiety:                             [ ]Mild [ ]Moderate [ ]Severe  Fatigue:                             [ ]Mild [ ]Moderate [ ]Severe  Nausea:                             [ ]Mild [ ]Moderate [ ]Severe  Loss of appetite:              [ ]Mild [ ]Moderate [ ]Severe  Constipation:                    [ ]Mild [ ]Moderate [ ]Severe    CPOT:    https://www.[facility].org/getattachment/[email or web address]/Critical-Care-Pain-Observation-Tool-(CPOT)    PAIN AD Score:	  http://geriatrictoolkit.[facility]/cog/painad.pdf (Ctrl + left click to view)    Other Symptoms:  [x ]All other review of systems negative     Palliative Performance Status Version 2:         %      http://[facility].org/files/news/palliative_performance_scale_ppsv2.pdf  PHYSICAL EXAM:  Vital Signs Last 24 Hrs  T(C): 37.2 (26 Jul 2021 07:46), Max: 37.2 (26 Jul 2021 07:46)  T(F): 99 (26 Jul 2021 07:46), Max: 99 (26 Jul 2021 07:46)  HR: 82 (26 Jul 2021 07:46) (74 - 82)  BP: 117/80 (26 Jul 2021 07:46) (112/74 - 126/83)  BP(mean): --  RR: 18 (26 Jul 2021 07:46) (16 - 18)  SpO2: 96% (26 Jul 2021 07:46) (94% - 96%) I&O's Summary       General: comfortable, no acute distress, well nourished  HEENT: Atraumatic, no LAD, trachea midline, PERRLA  Cardiovascular: normal s1s2, no murmurs, gallops or fricition rubs  Pulmonary: clear to ausculation Bilaterally, no wheezing , rhonchi  Gastrointestinal: soft non tender non distended, no masses felt, no organomegally  Muscloskeletal: no lower extremity edema, intact bilateral lower extremity pulses  Neurological: CN II-12 intact. No focal weakness  Psychiatrical: normal mood, cooperative  SKIN: no rash, lesions or ulcers      LABS: no new labs            RADIOLOGY & ADDITIONAL STUDIES: reviewed    Protein Calorie Malnutrition Present: [ ]mild [ ]moderate [ ]severe [ ]underweight [ ]morbid obesity  https://www.andeal.org/vault/3980/web/files/ONC/Table_Clinical%20Characteristics%20to%20Document%20Malnutrition-White%20JV%20et%20al%636375.pdf    Height (cm): 168.9 (07-20-21 @ 10:12), 167.6 (07-06-21 @ 10:28)  Weight (kg): 84.5 (07-20-21 @ 10:12), 83.9 (07-06-21 @ 10:28)  BMI (kg/m2): 29.6 (07-20-21 @ 10:12), 29.9 (07-06-21 @ 10:28)    [ ]PPSV2 < or = 30%  [ ]significant weight loss [ ]poor nutritional intake [ ]anasarca    [ ]Artificial Nutrition    REFERRALS:   [ ]Chaplaincy  [ ]Hospice  [ ]Child Life  [ ]Social Work  [ ]Case management [ ]Holistic Therapy     Goals of Care Document:     SUBJECTIVE AND OBJECTIVE/INTERVAL HPI/OVERNIGHT EVENTS:  - no acute events overnight  - c/o nausea & vomited x1 2/2 migraine. Takes Excedrin migraine > ordered 1 dose STAT.     DNR on chart:   Allergies    Gluten (Other)  No Known Drug Allergies    Intolerances    MEDICATIONS  (STANDING):  acetaminophen   Tablet .. 500 milliGRAM(s) Oral every 6 hours  amLODIPine   Tablet 5 milliGRAM(s) Oral daily  apixaban 2.5 milliGRAM(s) Oral every 12 hours  atorvastatin 10 milliGRAM(s) Oral at bedtime  celecoxib 200 milliGRAM(s) Oral every 12 hours  levothyroxine 50 MICROGram(s) Oral daily  metoprolol succinate ER 50 milliGRAM(s) Oral daily  oxyCODONE  ER Tablet 20 milliGRAM(s) Oral every 8 hours  pantoprazole    Tablet 40 milliGRAM(s) Oral before breakfast  polyethylene glycol 3350 17 Gram(s) Oral at bedtime  senna 2 Tablet(s) Oral at bedtime  valsartan 160 milliGRAM(s) Oral daily  vortioxetine 20 milliGRAM(s) Oral at bedtime    MEDICATIONS  (PRN):  ALPRAZolam 0.25 milliGRAM(s) Oral every 12 hours PRN anxiety  HYDROmorphone  Injectable 0.5 milliGRAM(s) IV Push every 3 hours PRN breakthrough pain  magnesium hydroxide Suspension 30 milliLiter(s) Oral daily PRN Constipation  ondansetron Injectable 4 milliGRAM(s) IV Push every 6 hours PRN Nausea and/or Vomiting  oxyCODONE    IR 10 milliGRAM(s) Oral every 4 hours PRN Severe Pain (7 - 10)  oxyCODONE    IR 5 milliGRAM(s) Oral every 4 hours PRN Moderate Pain (4 - 6)  Pain: [ x]yes [ ]no  QOL impact - yes   Location -     R knee               Aggravating factors - movement   Quality - sharp  Radiation - no  Timing- constant  Severity (0-10 scale): 5/10  Minimal acceptable level (0-10 scale): 3/10    ITEMS UNCHECKED ARE NOT PRESENT    PRESENT SYMPTOMS: [ ]Unable to obtain due to poor mentation   Source if other than patient:  [ ]Family   [ ]Team       Dyspnea:                           [ ]Mild [ ]Moderate [ ]Severe  Anxiety:                             [ ]Mild [ ]Moderate [ ]Severe  Fatigue:                             [ ]Mild [ ]Moderate [ ]Severe  Nausea:                             [ ]Mild [ ]Moderate [ ]Severe  Loss of appetite:              [ ]Mild [ ]Moderate [ ]Severe  Constipation:                    [ ]Mild [ ]Moderate [ ]Severe    CPOT:    https://www.[facility].org/getattachment/[email or web address]/Critical-Care-Pain-Observation-Tool-(CPOT)    PAIN AD Score:	  http://geriatrictoolkit.[facility]/cog/painad.pdf (Ctrl + left click to view)    Other Symptoms:  [x ]All other review of systems negative     Palliative Performance Status Version 2:         %      http://[email or web address].org/files/news/palliative_performance_scale_[email or web address].pdf  PHYSICAL EXAM:  Vital Signs Last 24 Hrs  T(C): 37.2 (26 Jul 2021 07:46), Max: 37.2 (26 Jul 2021 07:46)  T(F): 99 (26 Jul 2021 07:46), Max: 99 (26 Jul 2021 07:46)  HR: 82 (26 Jul 2021 07:46) (74 - 82)  BP: 117/80 (26 Jul 2021 07:46) (112/74 - 126/83)  BP(mean): --  RR: 18 (26 Jul 2021 07:46) (16 - 18)  SpO2: 96% (26 Jul 2021 07:46) (94% - 96%) I&O's Summary       General: comfortable, no acute distress, well nourished  HEENT: Atraumatic, no LAD, trachea midline, PERRLA  Cardiovascular: normal s1s2, no murmurs, gallops or fricition rubs  Pulmonary: clear to ausculation Bilaterally, no wheezing , rhonchi  Gastrointestinal: soft non tender non distended, no masses felt, no organomegally  Muscloskeletal: no lower extremity edema, intact bilateral lower extremity pulses  Neurological: CN II-12 intact. No focal weakness  Psychiatrical: normal mood, cooperative  SKIN: no rash, lesions or ulcers      LABS: no new labs            RADIOLOGY & ADDITIONAL STUDIES: reviewed    Protein Calorie Malnutrition Present: [ ]mild [ ]moderate [ ]severe [ ]underweight [ ]morbid obesity  https://www.andeal.org/vault/9400/web/files/ONC/Table_Clinical%20Characteristics%20to%20Document%20Malnutrition-White%20JV%20et%20al%083409.pdf    Height (cm): 168.9 (07-20-21 @ 10:12), 167.6 (07-06-21 @ 10:28)  Weight (kg): 84.5 (07-20-21 @ 10:12), 83.9 (07-06-21 @ 10:28)  BMI (kg/m2): 29.6 (07-20-21 @ 10:12), 29.9 (07-06-21 @ 10:28)    [ ]PPSV2 < or = 30%  [ ]significant weight loss [ ]poor nutritional intake [ ]anasarca    [ ]Artificial Nutrition    REFERRALS:   [ ]Chaplaincy  [ ]Hospice  [ ]Child Life  [ ]Social Work  [ ]Case management [ ]Holistic Therapy     Goals of Care Document:

## 2021-07-26 NOTE — PROGRESS NOTE ADULT - SUBJECTIVE AND OBJECTIVE BOX
Patient is a 56y old  Female who presents with a chief complaint of Aftercare following (25 Jul 2021 15:50)      INTERVAL HPI/OVERNIGHT EVENTS: Patient seen and examined at bedside. No overnight events. Patient with headache and nausea today, has hx of migranes. No blurred vision, chest pain, palpitations, left arm pain, numbness, tingling        MEDICATIONS  (STANDING):  acetaminophen   Tablet .. 500 milliGRAM(s) Oral every 6 hours  acetaminophen   Tablet .. 500 milliGRAM(s) Oral once  amLODIPine   Tablet 5 milliGRAM(s) Oral daily  apixaban 2.5 milliGRAM(s) Oral every 12 hours  aspirin enteric coated 325 milliGRAM(s) Oral once  atorvastatin 10 milliGRAM(s) Oral at bedtime  caffeine  Tablet 200 milliGRAM(s) Oral once  celecoxib 200 milliGRAM(s) Oral every 12 hours  levothyroxine 50 MICROGram(s) Oral daily  metoprolol succinate ER 50 milliGRAM(s) Oral daily  oxyCODONE  ER Tablet 20 milliGRAM(s) Oral every 8 hours  pantoprazole    Tablet 40 milliGRAM(s) Oral before breakfast  polyethylene glycol 3350 17 Gram(s) Oral at bedtime  senna 2 Tablet(s) Oral at bedtime  valsartan 160 milliGRAM(s) Oral daily  vortioxetine 20 milliGRAM(s) Oral at bedtime    MEDICATIONS  (PRN):  ALPRAZolam 0.25 milliGRAM(s) Oral every 12 hours PRN anxiety  bisacodyl Suppository 10 milliGRAM(s) Rectal daily PRN Constipation  HYDROmorphone  Injectable 0.5 milliGRAM(s) IV Push every 3 hours PRN breakthrough pain  magnesium hydroxide Suspension 30 milliLiter(s) Oral daily PRN Constipation  ondansetron Injectable 4 milliGRAM(s) IV Push every 6 hours PRN Nausea and/or Vomiting  oxyCODONE    IR 5 milliGRAM(s) Oral every 4 hours PRN Moderate Pain (4 - 6)  oxyCODONE    IR 10 milliGRAM(s) Oral every 4 hours PRN Severe Pain (7 - 10)      Allergies    Gluten (Other)  No Known Drug Allergies    Intolerances        REVIEW OF SYSTEMS:  CONSTITUTIONAL: No fever or chills  HEENT:  admits headache, no sore throat  RESPIRATORY: No cough, wheezing, or shortness of breath  CARDIOVASCULAR: No chest pain, palpitations, or leg swelling  GASTROINTESTINAL: No abd pain, nausea, vomiting, or diarrhea  GENITOURINARY: No dysuria, frequency, or hematuria  NEUROLOGICAL: no focal weakness or dizziness  MUSCULOSKELETAL: no myalgias     Vital Signs Last 24 Hrs  T(C): 37.2 (26 Jul 2021 07:46), Max: 37.2 (26 Jul 2021 07:46)  T(F): 99 (26 Jul 2021 07:46), Max: 99 (26 Jul 2021 07:46)  HR: 82 (26 Jul 2021 07:46) (74 - 82)  BP: 117/80 (26 Jul 2021 07:46) (112/74 - 126/83)  BP(mean): --  RR: 18 (26 Jul 2021 07:46) (16 - 18)  SpO2: 96% (26 Jul 2021 07:46) (94% - 96%)    PHYSICAL EXAM:  GENERAL: NAD, anxious   HEENT:  EOMI, moist mucous membranes  CHEST/LUNG:  CTA b/l, no rales, wheezes, or rhonchi  HEART:  RRR, S1, S2  ABDOMEN:  BS+, soft, nontender, nondistended  EXTREMITIES: muscle strength 5/5, no edema, cyanosis, or calf tenderness  NERVOUS SYSTEM: AA&Ox3, sensation intact     LABS:                CAPILLARY BLOOD GLUCOSE              RADIOLOGY & ADDITIONAL TESTS:     Consultant(s) Notes Reviewed:  [x] YES  [ ] NO    Care Discussed with [x] Consultants  [x] Patient  [ ] Family  [ ]      [ x] Other; RN  DVT ppx

## 2021-07-26 NOTE — PROGRESS NOTE ADULT - ASSESSMENT
55 yo f pmh hypothyroidism, hld, htn, anxiety/depression, gerd is s/p Right TKR    1) Intractable pain of R knee S/P Right TKR  2) Severe anxiety exacerbating pain  - continue oxycontin 20 mg PO q8h RTC   - continue oxycodone 5 mg PO q4h prn moderate pain  - continue oxycodone 10 mg PO q4h prn severe pain  - continue tizanidine qhs for muscle spasm (less sedating compared to valium and flexeril)  - xanax 0.25 mg PO q12h prn     3) Constipation prophylaxis 2/2 opioid use   - senna and miralax  - bisacodyl supp prn    Appreciate consult. Discussed with the primary team.    Gayathri Keith MD

## 2021-07-27 VITALS
DIASTOLIC BLOOD PRESSURE: 72 MMHG | TEMPERATURE: 99 F | RESPIRATION RATE: 19 BRPM | OXYGEN SATURATION: 94 % | HEART RATE: 77 BPM | SYSTOLIC BLOOD PRESSURE: 104 MMHG

## 2021-07-27 LAB
ANION GAP SERPL CALC-SCNC: 2 MMOL/L — LOW (ref 5–17)
BUN SERPL-MCNC: 11 MG/DL — SIGNIFICANT CHANGE UP (ref 7–23)
CALCIUM SERPL-MCNC: 8.9 MG/DL — SIGNIFICANT CHANGE UP (ref 8.4–10.5)
CHLORIDE SERPL-SCNC: 103 MMOL/L — SIGNIFICANT CHANGE UP (ref 96–108)
CO2 SERPL-SCNC: 33 MMOL/L — HIGH (ref 22–31)
CREAT SERPL-MCNC: 0.52 MG/DL — SIGNIFICANT CHANGE UP (ref 0.5–1.3)
GLUCOSE SERPL-MCNC: 117 MG/DL — HIGH (ref 70–99)
HCT VFR BLD CALC: 31.5 % — LOW (ref 34.5–45)
HGB BLD-MCNC: 10.2 G/DL — LOW (ref 11.5–15.5)
MCHC RBC-ENTMCNC: 32.3 PG — SIGNIFICANT CHANGE UP (ref 27–34)
MCHC RBC-ENTMCNC: 32.4 GM/DL — SIGNIFICANT CHANGE UP (ref 32–36)
MCV RBC AUTO: 99.7 FL — SIGNIFICANT CHANGE UP (ref 80–100)
NRBC # BLD: 0 /100 WBCS — SIGNIFICANT CHANGE UP (ref 0–0)
PLATELET # BLD AUTO: 357 K/UL — SIGNIFICANT CHANGE UP (ref 150–400)
POTASSIUM SERPL-MCNC: 3.8 MMOL/L — SIGNIFICANT CHANGE UP (ref 3.5–5.3)
POTASSIUM SERPL-SCNC: 3.8 MMOL/L — SIGNIFICANT CHANGE UP (ref 3.5–5.3)
RBC # BLD: 3.16 M/UL — LOW (ref 3.8–5.2)
RBC # FLD: 12.1 % — SIGNIFICANT CHANGE UP (ref 10.3–14.5)
SARS-COV-2 RNA SPEC QL NAA+PROBE: SIGNIFICANT CHANGE UP
SODIUM SERPL-SCNC: 138 MMOL/L — SIGNIFICANT CHANGE UP (ref 135–145)
WBC # BLD: 6.72 K/UL — SIGNIFICANT CHANGE UP (ref 3.8–10.5)
WBC # FLD AUTO: 6.72 K/UL — SIGNIFICANT CHANGE UP (ref 3.8–10.5)

## 2021-07-27 PROCEDURE — 85027 COMPLETE CBC AUTOMATED: CPT

## 2021-07-27 PROCEDURE — 81001 URINALYSIS AUTO W/SCOPE: CPT

## 2021-07-27 PROCEDURE — 97530 THERAPEUTIC ACTIVITIES: CPT

## 2021-07-27 PROCEDURE — 97116 GAIT TRAINING THERAPY: CPT

## 2021-07-27 PROCEDURE — 85014 HEMATOCRIT: CPT

## 2021-07-27 PROCEDURE — 97535 SELF CARE MNGMENT TRAINING: CPT

## 2021-07-27 PROCEDURE — 87635 SARS-COV-2 COVID-19 AMP PRB: CPT

## 2021-07-27 PROCEDURE — C1713: CPT

## 2021-07-27 PROCEDURE — 85018 HEMOGLOBIN: CPT

## 2021-07-27 PROCEDURE — 36415 COLL VENOUS BLD VENIPUNCTURE: CPT

## 2021-07-27 PROCEDURE — 80076 HEPATIC FUNCTION PANEL: CPT

## 2021-07-27 PROCEDURE — 73562 X-RAY EXAM OF KNEE 3: CPT

## 2021-07-27 PROCEDURE — 86769 SARS-COV-2 COVID-19 ANTIBODY: CPT

## 2021-07-27 PROCEDURE — 97165 OT EVAL LOW COMPLEX 30 MIN: CPT

## 2021-07-27 PROCEDURE — 74018 RADEX ABDOMEN 1 VIEW: CPT

## 2021-07-27 PROCEDURE — 88305 TISSUE EXAM BY PATHOLOGIST: CPT

## 2021-07-27 PROCEDURE — 99232 SBSQ HOSP IP/OBS MODERATE 35: CPT

## 2021-07-27 PROCEDURE — 97110 THERAPEUTIC EXERCISES: CPT

## 2021-07-27 PROCEDURE — 88311 DECALCIFY TISSUE: CPT

## 2021-07-27 PROCEDURE — 97161 PT EVAL LOW COMPLEX 20 MIN: CPT

## 2021-07-27 PROCEDURE — 80053 COMPREHEN METABOLIC PANEL: CPT

## 2021-07-27 PROCEDURE — 80048 BASIC METABOLIC PNL TOTAL CA: CPT

## 2021-07-27 PROCEDURE — C1776: CPT

## 2021-07-27 RX ADMIN — ONDANSETRON 4 MILLIGRAM(S): 8 TABLET, FILM COATED ORAL at 08:29

## 2021-07-27 RX ADMIN — OXYCODONE HYDROCHLORIDE 10 MILLIGRAM(S): 5 TABLET ORAL at 04:00

## 2021-07-27 RX ADMIN — Medication 50 MILLIGRAM(S): at 05:21

## 2021-07-27 RX ADMIN — Medication 500 MILLIGRAM(S): at 05:21

## 2021-07-27 RX ADMIN — APIXABAN 2.5 MILLIGRAM(S): 2.5 TABLET, FILM COATED ORAL at 10:19

## 2021-07-27 RX ADMIN — OXYCODONE HYDROCHLORIDE 10 MILLIGRAM(S): 5 TABLET ORAL at 03:27

## 2021-07-27 RX ADMIN — OXYCODONE HYDROCHLORIDE 20 MILLIGRAM(S): 5 TABLET ORAL at 13:11

## 2021-07-27 RX ADMIN — Medication 10 MILLIGRAM(S): at 09:07

## 2021-07-27 RX ADMIN — Medication 50 MICROGRAM(S): at 05:21

## 2021-07-27 RX ADMIN — OXYCODONE HYDROCHLORIDE 20 MILLIGRAM(S): 5 TABLET ORAL at 06:18

## 2021-07-27 RX ADMIN — OXYCODONE HYDROCHLORIDE 20 MILLIGRAM(S): 5 TABLET ORAL at 13:10

## 2021-07-27 RX ADMIN — PANTOPRAZOLE SODIUM 40 MILLIGRAM(S): 20 TABLET, DELAYED RELEASE ORAL at 05:21

## 2021-07-27 RX ADMIN — Medication 500 MILLIGRAM(S): at 05:46

## 2021-07-27 RX ADMIN — OXYCODONE HYDROCHLORIDE 20 MILLIGRAM(S): 5 TABLET ORAL at 06:34

## 2021-07-27 NOTE — PROGRESS NOTE ADULT - PROVIDER SPECIALTY LIST ADULT
Orthopedics
Palliative Care
Anesthesia
Internal Medicine
Orthopedics
Pharmacy
Internal Medicine
Internal Medicine
Orthopedics
Hospitalist
Internal Medicine
Internal Medicine

## 2021-07-27 NOTE — PROGRESS NOTE ADULT - SUBJECTIVE AND OBJECTIVE BOX
patient seen and examined at bedside  no acute overnight events  patient feels better  cleared by PT for home    Review of Systems:  General:denies fever chills, headache, weakness  HEENT: denies blurry vision,diffculty swallowing, difficulty hearing, tinnitus  Cardiovascular: denies chest pain  ,palpitations  Pulmonary:denies shortness of breath, cough, wheezing, hemoptysis  Gastrointestinal: denies abdominal pain, constipation, diarrhea,nausea , vomiting, hematochezia  : denies hematuria, dysuria, or incontinence  Neurological: denies weakness, numbness , tingling, dizziness, tremors  MSK: denies muscle pain, difficulty ambulating, swelling, back pain  skin: denies skin rash, itching, burning, or  skin lesions  Psychiatrical: denies mood disturbances, anxierty, feeling depressed, depression , or difficulty sleeping    Objective:  Vitals  T(C): 37.3 (07-27-21 @ 07:46), Max: 37.3 (07-26-21 @ 15:11)  HR: 70 (07-27-21 @ 07:46) (70 - 78)  BP: 102/69 (07-27-21 @ 07:46) (102/69 - 120/78)  RR: 20 (07-27-21 @ 07:46) (18 - 20)  SpO2: 96% (07-27-21 @ 07:46) (95% - 96%)    Physical Exam:  General: comfortable, no acute distress, well nourished  HEENT: Atraumatic, no LAD, trachea midline, PERRLA  Cardiovascular: normal s1s2, no murmurs, gallops or fricition rubs  Pulmonary: clear to ausculation Bilaterally, no wheezing , rhonchi  Gastrointestinal: soft non tender non distended, no masses felt, no organomegally  Muscloskeletal: no lower extremity edema, intact bilateral lower extremity pulses  Neurological: CN II-12 intact. No focal weakness  Psychiatrical: normal mood, cooperative  SKIN: no rash, lesions or ulcers    Labs:                          10.2   6.72  )-----------( 357      ( 27 Jul 2021 07:16 )             31.5     07-27    138  |  103  |  11  ----------------------------<  117<H>  3.8   |  33<H>  |  0.52    Ca    8.9      27 Jul 2021 07:16              Active Medications  MEDICATIONS  (STANDING):  acetaminophen   Tablet .. 500 milliGRAM(s) Oral every 6 hours  amLODIPine   Tablet 5 milliGRAM(s) Oral daily  apixaban 2.5 milliGRAM(s) Oral every 12 hours  atorvastatin 10 milliGRAM(s) Oral at bedtime  celecoxib 200 milliGRAM(s) Oral every 12 hours  levothyroxine 50 MICROGram(s) Oral daily  metoprolol succinate ER 50 milliGRAM(s) Oral daily  oxyCODONE  ER Tablet 20 milliGRAM(s) Oral every 8 hours  pantoprazole    Tablet 40 milliGRAM(s) Oral before breakfast  polyethylene glycol 3350 17 Gram(s) Oral at bedtime  senna 2 Tablet(s) Oral at bedtime  valsartan 160 milliGRAM(s) Oral daily  vortioxetine 20 milliGRAM(s) Oral at bedtime    MEDICATIONS  (PRN):  ALPRAZolam 0.25 milliGRAM(s) Oral every 12 hours PRN anxiety  bisacodyl Suppository 10 milliGRAM(s) Rectal daily PRN Constipation  HYDROmorphone  Injectable 0.5 milliGRAM(s) IV Push every 3 hours PRN breakthrough pain  magnesium hydroxide Suspension 30 milliLiter(s) Oral daily PRN Constipation  ondansetron Injectable 4 milliGRAM(s) IV Push every 6 hours PRN Nausea and/or Vomiting  oxyCODONE    IR 5 milliGRAM(s) Oral every 4 hours PRN Moderate Pain (4 - 6)  oxyCODONE    IR 10 milliGRAM(s) Oral every 4 hours PRN Severe Pain (7 - 10)

## 2021-07-27 NOTE — PROGRESS NOTE ADULT - SUBJECTIVE AND OBJECTIVE BOX
Post Op     TJ SAMUELS      56y        Female                                                                                                                 T(C): 37.3 (07-27-21 @ 07:46), Max: 37.3 (07-26-21 @ 15:11)  HR: 70 (07-27-21 @ 07:46) (70 - 78)  BP: 102/69 (07-27-21 @ 07:46) (102/69 - 120/78)  RR: 20 (07-27-21 @ 07:46) (18 - 20)  SpO2: 96% (07-27-21 @ 07:46) (95% - 96%)  Wt(kg): --    S/P   total knee replacement    Patient denies shortness of breath, chest pain, dyspnea, No complaints  Pain is3X /10    Physical Exam    Extremity: Bilaterally:  No holmon                                           No Cord                                          PAS on                                          Neurovascular intact                                          Motor intact EHL/FHL                                          Sensation intact                                          Pulses intact DP/PT                                         Calves Soft                                         Dressing Clean / Dry / Intact prineo                                         Capillary refill with 5 seconds                          10.2   6.72  )-----------( 357      ( 27 Jul 2021 07:16 )             31.5       07-27    138  |  103  |  11  ----------------------------<  117<H>  3.8   |  33<H>  |  0.52    Ca    8.9      27 Jul 2021 07:16        A/P  -- S/P total knee replacement    -  Medicine To Follow   - DVT prophylaxis PAS eliquis  - PT & OT   - Analagesia  - Incentive Spirometry  - Discharge Planning  - Safety Precautions  -  CBC , BMP daily

## 2021-07-27 NOTE — PROGRESS NOTE ADULT - REASON FOR ADMISSION
total knee replacement
Aftercare following
Aftercare following Joint surgery
Aftercare following ortho surgery
S/p right total knee replacement
Perioperative Medical management
Aftercare following Knee Replacement

## 2021-07-27 NOTE — PROGRESS NOTE ADULT - SUBJECTIVE AND OBJECTIVE BOX
ORTHOPEDIC ATTENDING PROGRESS NOTE  TJ SAMUELS      56y Female                                                                                                                               POD #   7     STATUS POST:               Pre-Op Dx: Right knee DJD      Post-Op Dx:  Right knee DJD      Procedure: Right total knee replacement                                                  Pain (0-10):  Pt reports  Current Pain Management:  [ ] PCA   [x ] Po Analgesics [ ] IM /IV Anagesics     T(F): 99.1  HR: 70  BP: 102/69  RR: 20  SpO2: 96%                         10.2   6.72  )-----------( 357      ( 27 Jul 2021 07:16 )             31.5         07-27    138  |  103  |  11  ----------------------------<  117<H>  3.8   |  33<H>  |  0.52    Ca    8.9      27 Jul 2021 07:16      Physical Exam :    -  Dressing C/D/I.   -   Distal Neurvascular status intact grossly.   -   Warm well perfused; capillary refill <3 seconds   -   (+)EHL/FHL   -   (+) Sensation to light touch  -   (-) Calf tenderness Bilaterally    A/P: 56y Female s/p Right total knee replacement       -   Ortho Stable  -   Pain control   -   Medicine to follow  -   DVT ppx:     [ ]SCDs     [x ] ASA     [ ] Eliquis     [ ] Lovenox  -   Weight bearing status:  WBAT [x ]        PWB    [ ]     TTWB  [ ]      NWB  [ ]   -  Dispo:     Home [x ]     Acute Rehab [ ]     [facility] [ ]     TBD [ ]

## 2021-07-27 NOTE — PROGRESS NOTE ADULT - ASSESSMENT
55 yo f pmh hypothyroidism, hld, htn, anxiety/depression, gerd is s/p Right TKR    #S/P Right TKR  Post op orders per ortho  Pain management: short acting changed to long acting oxycontin  D/w palliatve, can taper pain regimen at rehab  Bowl regimen  Pt eval appreciated- [facility]- pending placement    #headache  has hx of migranes with nausea, typically resoves with coffee or Excedrin  Caffine tab ordered  D/w palliative, can give Imitrex if still symptomatic      #HLD  continue statins    #HTN  hold diuretics unitil pod day 2    #Hypothyroidism  levothyroxine    #Anxiety/Depression  continue home med    #GERD  PPI    #DVT proph  per ortho    #increased frequency: likely due to constipation : u/a negative.KUB ++ constipation  - Dulcolax suppository PRN    dispo: home

## 2021-07-27 NOTE — PROGRESS NOTE ADULT - NSICDXPILOT_GEN_ALL_CORE
[place]
Macrobid as directed  Increase fluids and rest  Call in 2 days for culture results   If no improvement in 3-4 days f/u with PCP   If anything changes or worsens, f/u sooner
[place]

## 2021-07-28 RX ORDER — ONDANSETRON 8 MG/1
1 TABLET, FILM COATED ORAL
Qty: 20 | Refills: 0
Start: 2021-07-28

## 2021-08-09 ENCOUNTER — APPOINTMENT (OUTPATIENT)
Dept: ORTHOPEDIC SURGERY | Facility: CLINIC | Age: 56
End: 2021-08-09
Payer: COMMERCIAL

## 2021-08-09 PROCEDURE — 73560 X-RAY EXAM OF KNEE 1 OR 2: CPT | Mod: RT

## 2021-08-09 PROCEDURE — 99024 POSTOP FOLLOW-UP VISIT: CPT

## 2021-08-09 NOTE — DISCUSSION/SUMMARY
[de-identified] : I discussed the underlying pathophysiology of the patient's condition in great detail with the patient. I went over the patient's x-rays with them in great detail. We discussed the use of ice, Tylenol and anti-inflammatories to relieve pain. I strongly encouraged her to d/c the narcotics. At-home strengthening, and stretching exercises were discussed and demonstrated with the patient. She should avoid excessive walking. At this time, she will start a course of outpatient physical therapy at [facility] for strengthening and flexibility. A prescription for physical therapy was provided. All of her questions were answered. She understands and consents to the plan.\par \par FU 1 month.\par after undergoing outpatient PT for her right knee.

## 2021-08-09 NOTE — ADDENDUM
[FreeTextEntry1] : I, Bhanu Harrison, acted solely as a scribe for Dr. Sergio Ruffin on this date 08/09/2021.\par All medical record entries made by the Scribe were at my, Dr. Sergio Ruffin, direction and personally dictated by me on 08/09/2021. I have reviewed the chart and agree that the record accurately reflects my personal performance of the history, physical exam, assessment and plan. I have also personally directed, reviewed, and agreed with the chart.

## 2021-08-09 NOTE — PHYSICAL EXAM
[de-identified] : Constitutional\par o Appearance : well-nourished, well developed, alert, in no acute distress \par Head and Face\par o Head :\par ¦ Inspection : atraumatic, normocephalic\par o Face :\par ¦ Inspection : no visible rash or discoloration\par Respiratory\par o Respiratory Effort: breathing unlabored \par Neurologic\par o Mental Status Examination :\par ¦ Orientation : grossly oriented to person, place and time\par Psychiatric\par o Mood and Affect: mood normal, affect appropriate \par \par Right Lower Extremity\par o Buttock : no tenderness, swelling or deformities \par o Right Hip :\par ¦ Inspection/Palpation : no tenderness, swelling or deformities\par ¦ Range of Motion : full and painless in all planes, no crepitance\par ¦ Stability : joint stability intact\par ¦ Strength : extension, flexion, adduction, abduction, internal rotation and external rotation, 4/5 \par \par o Right Knee :\par ¦ Inspection/Palpation : peripatellar tenderness to palpation, minimal swelling, Prineo was removed, incision well-healed\par ¦ Range of Motion : 0-100°, no crepitance, good patellofemoral glide  \par ¦ Stability : no valgus or varus instability present on provocative testing\par ¦ Strength : flexion and extension 4/5, can SLR, no extensor lag\par ¦ Tests and Signs : negative Anterior Drawer\par \par Left Lower Extremity\par o Buttock : no tenderness, swelling or deformities \par o Left Hip :\par ¦ Inspection/Palpation : no tenderness, no swelling or deformities\par ¦ Range of Motion : full and painless in all planes, no crepitance\par ¦ Stability : joint stability intact\par ¦ Strength : extension, flexion, adduction, abduction, internal rotation and external rotation, 4+/5\par \par o Left Knee :\par ¦ Inspection/Palpation : medial and mild lateral compartment tenderness, medial and lateral patellar facet tenderness, no swelling, mild varus deformity mild tenderness of patellofemoral \par ¦ Range of Motion : FROM, pain on full flexion, mild patellofemoral crepitance, good patellofemoral glide\par ¦ Stability : no valgus or varus instability present on provocative testing\par ¦ Strength : flexion and extension 4+/5\par ¦ Tests and Signs : Anterior Drawer negative, Lachman negative, Evans's negative\par \par o Peripheral Vascular System :\par ¦ Dorsalis Pedis Artery : pulse 2+ bilaterally\par ¦ Posterior Tibial Artery : pulse 2+ bilaterally \par \par Gait : walking with a walker, heel/toe on the right, mild varus deformity on the left, no significant extremity swelling or lymphedema, good proprioception and balance\par \par Radiology Results:\par o Right Knee : Standing AP and lateral views of the knee were obtained and revealed excellent position of the total knee replacement.

## 2021-08-09 NOTE — REASON FOR VISIT
[Post Operative Visit] : a post operative visit for [Aftercare Following Surgery] : aftercare following surgery [FreeTextEntry2] : s/p right TKR done 7/20/2021

## 2021-08-09 NOTE — HISTORY OF PRESENT ILLNESS
[de-identified] : 56 year old female patient returns for the 1st postoperative visit, 3 weeks s/p right TKR done 7/20/2021. The patient is recovering at home. She reports significant postoperative pain and is managing with Celebrex, as well as oxycodone before bed. A physical therapist is coming to her house and her last visit is on Wednesday. The patient presents ambulating with a walker. The patient is taking baby aspirin BID for DVT prophylaxis. Of note, She received a left knee Durolane injection on 6/24/21. \par \par Radiology Results from 5/13/21:\par o Left Knee : Standing AP, lateral and tunnel views of the knee were obtained and revealed moderate to severe medial compartment narrowing with severe patellofemoral arthritis.

## 2021-08-10 PROBLEM — Z86.16 PERSONAL HISTORY OF COVID-19: Chronic | Status: ACTIVE | Noted: 2021-07-06

## 2021-08-10 PROBLEM — L30.9 DERMATITIS, UNSPECIFIED: Chronic | Status: ACTIVE | Noted: 2019-10-07

## 2021-08-10 PROBLEM — M79.7 FIBROMYALGIA: Chronic | Status: ACTIVE | Noted: 2021-07-06

## 2021-08-10 PROBLEM — L98.2 FEBRILE NEUTROPHILIC DERMATOSIS [SWEET]: Chronic | Status: ACTIVE | Noted: 2021-07-06

## 2021-09-27 ENCOUNTER — APPOINTMENT (OUTPATIENT)
Dept: ORTHOPEDIC SURGERY | Facility: CLINIC | Age: 56
End: 2021-09-27
Payer: COMMERCIAL

## 2021-09-27 PROCEDURE — 99213 OFFICE O/P EST LOW 20 MIN: CPT | Mod: 24,25

## 2021-09-27 PROCEDURE — 20610 DRAIN/INJ JOINT/BURSA W/O US: CPT | Mod: 79,LT

## 2021-09-27 PROCEDURE — 73562 X-RAY EXAM OF KNEE 3: CPT | Mod: RT

## 2021-09-27 NOTE — PHYSICAL EXAM
[de-identified] : Constitutional\par o Appearance : well-nourished, well developed, alert, in no acute distress \par Head and Face\par o Head :\par ¦ Inspection : atraumatic, normocephalic\par o Face :\par ¦ Inspection : no visible rash or discoloration\par Respiratory\par o Respiratory Effort: breathing unlabored \par Neurologic\par o Mental Status Examination :\par ¦ Orientation : grossly oriented to person, place and time\par Psychiatric\par o Mood and Affect: mood normal, affect appropriate \par \par Right Lower Extremity\par o Buttock : no tenderness, swelling or deformities \par o Right Hip :\par ¦ Inspection/Palpation : no tenderness, swelling or deformities\par ¦ Range of Motion : full and painless in all planes, no crepitance\par ¦ Stability : joint stability intact\par ¦ Strength : extension, flexion, adduction, abduction, internal rotation and external rotation, 4+/5 \par \par o Right Knee :\par ¦ Inspection/Palpation : no tenderness to palpation, mild swelling, incision well-healed\par ¦ Range of Motion : 5-118°, no crepitance, good patellofemoral glide  \par ¦ Stability : no valgus or varus instability present on provocative testing\par ¦ Strength : flexion and extension 4+/5, can SLR, no extensor lag\par ¦ Tests and Signs : negative Anterior Drawer\par \par Left Lower Extremity\par o Buttock : no tenderness, swelling or deformities \par o Left Hip :\par ¦ Inspection/Palpation : no tenderness, no swelling or deformities\par ¦ Range of Motion : full and painless in all planes, no crepitance\par ¦ Stability : joint stability intact\par ¦ Strength : extension, flexion, adduction, abduction, internal rotation and external rotation, 4+/5\par \par o Left Knee :\par ¦ Inspection/Palpation : medial compartment tenderness, medial and lateral patellar facet tenderness, no swelling, mild varus deformity mild tenderness of patellofemoral \par ¦ Range of Motion : 0-125°, pain on full flexion, mild patellofemoral crepitance, good patellofemoral glide\par ¦ Stability : no valgus or varus instability present on provocative testing\par ¦ Strength : flexion and extension 4+/5\par ¦ Tests and Signs : Anterior Drawer negative, Lachman negative, Evans's negative\par \par o Peripheral Vascular System :\par ¦ Dorsalis Pedis Artery : pulse 2+ bilaterally\par ¦ Posterior Tibial Artery : pulse 2+ bilaterally \par \par Gait : walking with a walker, heel/toe on the right, mild varus deformity on the left, no significant extremity swelling or lymphedema, good proprioception and balance\par \par Radiology Results:\par o Right Knee : Standing AP, Lateral and skyline views of the knee were obtained and revealed excellent position of her right total knee replacement with central tracking of the patella. \par \par o Left Knee injection : Indication- knee osteoarthritis, Anatomic location- left intra-articular joint space, Spray - area was sterilized with Betadine and alcohol and anesthetized with Ethyl Chloride , needle used-20G, Medications given- 5cc's lidocaine, 0.5cc's kenalog, 0.5 cc's dexamethasone, and patient tolerated it well.

## 2021-09-27 NOTE — HISTORY OF PRESENT ILLNESS
[de-identified] : 56 year old female patient returns for the 2nd postoperative visit, 10 weeks s/p right TKR done 7/20/2021. The patient is recovering at home. She reports mild postoperative pain. She is attending outpatient PT. She is complaining of left knee pain that is keeping her up at night. She wants to discuss an injection. She had a left knee cortisone injection on 5/13/2021 and a Durolane injection on 6/24/2021.\par \par Radiology Results from 5/13/2021:\par o Left Knee : Standing AP, lateral and tunnel views of the knee were obtained and revealed significant medial and severe patellofemoral arthritis.

## 2021-09-27 NOTE — ADDENDUM
[FreeTextEntry1] : I, Bhanu Harrison, acted solely as a scribe for Dr. Sergio Ruffin on this date 09/27/2021.\par All medical record entries made by the Scribe were at my, Dr. Sergio Ruffin, direction and personally dictated by me on 09/27/2021. I have reviewed the chart and agree that the record accurately reflects my personal performance of the history, physical exam, assessment and plan. I have also personally directed, reviewed, and agreed with the chart.

## 2021-09-27 NOTE — DISCUSSION/SUMMARY
[de-identified] : I went over the pathophysiology of the patient's symptoms in great detail with the patient. I showed the patient how to massage her scar with hand cream in order to desensitize the area and free up the scar tissue, and advised her to do it daily. At-home strengthening and stretching exercises were discussed and demonstrated with the patient. The patient elected to receive a cortisone injection into her left knee today, and tolerated it well. I instructed the patient on ROM exercises, and told them to take it easy. The use of ice and rest was reviewed with the patient. The patient may resume activities tomorrow. I am recommending the patient continue going to physical therapy for both knees to obtain increases in their strength and mobility. A prescription was provided. All of her questions were answered. She understands and consents to the plan.\par \par FU 6 weeks.\par after a left knee cortisone injection today (09/27/2021). \par after continuing PT for both knees.

## 2021-10-11 ENCOUNTER — RX RENEWAL (OUTPATIENT)
Age: 56
End: 2021-10-11

## 2021-11-08 ENCOUNTER — APPOINTMENT (OUTPATIENT)
Dept: ORTHOPEDIC SURGERY | Facility: CLINIC | Age: 56
End: 2021-11-08
Payer: COMMERCIAL

## 2021-11-08 VITALS
BODY MASS INDEX: 25.76 KG/M2 | WEIGHT: 170 LBS | HEART RATE: 69 BPM | DIASTOLIC BLOOD PRESSURE: 83 MMHG | HEIGHT: 68 IN | SYSTOLIC BLOOD PRESSURE: 117 MMHG

## 2021-11-08 PROCEDURE — 99213 OFFICE O/P EST LOW 20 MIN: CPT

## 2021-11-08 PROCEDURE — 73562 X-RAY EXAM OF KNEE 3: CPT | Mod: RT

## 2021-11-08 NOTE — REASON FOR VISIT
[Follow-Up Visit] : a follow-up visit for [Knee Pain] : knee pain [FreeTextEntry2] : s/p right TKR done 7/20/2021

## 2021-11-08 NOTE — DISCUSSION/SUMMARY
[de-identified] : I discussed the underlying pathophysiology of the patient's condition in great detail with the patient. I went over the patient's x-rays with them in great detail. I showed the patient how to massage her scar with hand cream in order to thin out the area, and advised her to do it daily. At-home strengthening and stretching exercises were discussed and demonstrated in great detail with the patient, with an exercise sheet being provided. She should purchase adjustable Velcro ankle weights that range from 0-5 pounds and begin a diligent at-home exercise program. She should focus on light weight and high repetition exercises. I informed her that she is due for left knee viscosupplementation any time after 12/24/2021. We are requesting authorization for Durolane for the left knee. All of her questions were answered. She understands and consents to the plan.\par \par FU 6 weeks.\par after following a diligent HEP with weights and bands.\par after left knee viscosupplementation authorization.

## 2021-11-08 NOTE — HISTORY OF PRESENT ILLNESS
[de-identified] : 56 year old female patient returns 3.5 months s/p right TKR done 7/20/2021. She has used up all of her PT visits. She was given a HEP and has a blue Theraband at home. She thinks that she is making progress. She presents ambulating independently. She is also complaining of left knee pain. She received a left knee cortisone injection on 9/27/2021. Of note, she had Durolane into the left knee on 6/24/21.\par \par Radiology Results from 5/13/2021:\par o Left Knee : Standing AP, lateral and tunnel views of the knee were obtained and revealed significant medial and severe patellofemoral arthritis.

## 2021-11-08 NOTE — ADDENDUM
[FreeTextEntry1] : I, Bhanu Harrison, acted solely as a scribe for Dr. Sergio Ruffin on this date 11/08/2021.\par All medical record entries made by the Scribe were at my, Dr. Sergio Ruffin, direction and personally dictated by me on 11/08/2021. I have reviewed the chart and agree that the record accurately reflects my personal performance of the history, physical exam, assessment and plan. I have also personally directed, reviewed, and agreed with the chart.

## 2021-11-11 ENCOUNTER — FORM ENCOUNTER (OUTPATIENT)
Age: 56
End: 2021-11-11

## 2021-11-15 ENCOUNTER — FORM ENCOUNTER (OUTPATIENT)
Age: 56
End: 2021-11-15

## 2021-12-09 NOTE — DISCUSSION/SUMMARY
Detail Level: Generalized [de-identified] : I went over the pathophysiology of the patient's symptoms in great detail and used models to aid in my explanation. I went over the patient's x-rays with them in great detail. We discussed the use of ice, Tylenol and anti-inflammatories to relieve pain. I informed her that injections such as cortisone and Viscosupplementation are short term solutions. I informed the patient that surgery will be required to provide them long term relief from their symptoms. Patient understands she needs to consider knee replacement given she is losing motion and conservative measures are not providing enough relief. The proper pre and post operative procedures and expectations were discussed in extensive detail with the patient. We had a lengthy discussion about the patient's issues, and talked about the benefits and risks of the procedure. A long and detailed discussion ensued about the COVID-19 vaccine and I highly encouraged her to get vaccinated after her knee replacement. All of her questions were answered. She understands and consents to the plan.\par \par FU 2 weeks after a right TKR.\par \par The patient is an 56 year old female with bone on bone arthritis of their right knee. Based upon the patient's continued symptoms and failure to respond to conservative treatment I have recommended a total knee replacement for this patient. A long discussion took place with the patient describing what a total joint replacement is and what the procedure would entail. A total knee model, similar to the implant that will be used during the operation was utilized to demonstrate and to discuss the various bearing surfaces of the implants. The hospitalization and post-operative care and rehabilitation were also discussed. The use of perioperative antibiotics and DVT prophylaxis were discussed. The risk, benefits and alternatives to a surgical intervention were discussed at length with the patient. The patient was also advised of risks related to the medical comorbidities and elevated body mass index (BMI). A lengthy discussion took place to review the most common complications including but not limited to: deep vein thrombosis, pulmonary embolus, heart attack, stroke, infection, wound breakdown, numbness, damage to nerves, tendon, muscles, arteries or other blood vessels, death and other possible complications from anesthesia. The patient was told that we will take steps to minimize these risks by using sterile technique, antibiotics and DVT prophylaxis when appropriate and follow the patient postoperatively in the office setting to monitor progress. The possibility of recurrent pain, no improvement in pain and actual worsening of pain were also discussed with the patient.\par \par The discharge plan of care focused on the patient going home following surgery. I encouraged the patient to make the necessary arrangements to have someone stay with them when they are discharged home. Following discharge, a home care nurse will visit the patient. They will open your home care case and request home physical therapy services. Home physical therapy will commence following discharge provided it is appropriate and covered by her health insurance benefit plan.\par \par The risks, benefits and alternative treatment options of total joint replacement were reviewed with the patient at great length. All questions were answered to the satisfaction of the patient. The patient participated in an interactive discussion of the total knee replacement implant planned for their surgery with questions answered. The patient agreed with the treatment plan, and has decided to move forward with the elective total knee replacement as planned.\par \par TJ SAMUELS was seen face to face and needs a commode for bedside use as the patient has no ability to acces the bathroom in their home. The patient also requires a rolling walker as this is needed for activities of daily living within their home secondary to the diagnosis of osteoarthritis.  Detail Level: Zone

## 2021-12-13 ENCOUNTER — APPOINTMENT (OUTPATIENT)
Dept: ORTHOPEDIC SURGERY | Facility: CLINIC | Age: 56
End: 2021-12-13
Payer: COMMERCIAL

## 2021-12-13 PROCEDURE — 99213 OFFICE O/P EST LOW 20 MIN: CPT | Mod: 25

## 2021-12-13 PROCEDURE — 20610 DRAIN/INJ JOINT/BURSA W/O US: CPT | Mod: LT

## 2021-12-13 NOTE — ADDENDUM
[FreeTextEntry1] : I, Bhanu Harrison, acted solely as a scribe for Dr. Sergio Ruffin on this date 12/13/2021.\par All medical record entries made by the Scribe were at my, Dr. Sergio Ruffin, direction and personally dictated by me on 12/13/2021. I have reviewed the chart and agree that the record accurately reflects my personal performance of the history, physical exam, assessment and plan. I have also personally directed, reviewed, and agreed with the chart.

## 2021-12-13 NOTE — PHYSICAL EXAM
[de-identified] : Constitutional\par o Appearance : well-nourished, well developed, alert, in no acute distress \par Head and Face\par o Head :\par ¦ Inspection : atraumatic, normocephalic\par o Face :\par ¦ Inspection : no visible rash or discoloration\par Respiratory\par o Respiratory Effort: breathing unlabored \par Neurologic\par o Mental Status Examination :\par ¦ Orientation : grossly oriented to person, place and time\par Psychiatric\par o Mood and Affect: mood normal, affect appropriate \par \par Right Lower Extremity\par o Buttock : no tenderness, swelling or deformities \par o Right Hip :\par ¦ Inspection/Palpation : no tenderness, swelling or deformities\par ¦ Range of Motion : full and painless in all planes, no crepitance\par ¦ Stability : joint stability intact\par ¦ Strength : extension, flexion, adduction, abduction, internal rotation and external rotation, 4+/5 \par \par o Right Knee :\par ¦ Inspection/Palpation : medial compartment tenderness to palpation, no swelling, incision well-healed\par ¦ Range of Motion : 3-120°, no crepitance, good patellofemoral glide  \par ¦ Stability : no valgus or varus instability present on provocative testing\par ¦ Strength : flexion and extension 4+/5, can SLR, no extensor lag\par ¦ Tests and Signs : negative Anterior Drawer\par \par Left Lower Extremity\par o Buttock : no tenderness, swelling or deformities \par o Left Hip :\par ¦ Inspection/Palpation : no tenderness, no swelling or deformities\par ¦ Range of Motion : full and painless in all planes, no crepitance\par ¦ Stability : joint stability intact\par ¦ Strength : extension, flexion, adduction, abduction, internal rotation and external rotation, 4+/5\par \par o Left Knee :\par ¦ Inspection/Palpation : medial compartment tenderness, medial and lateral facet tenderness of the patella, no swelling, mild varus deformity\par ¦ Range of Motion : 0-125°, pain on full flexion, mild patellofemoral crepitance, decreased patellofemoral glide\par ¦ Stability : no valgus or varus instability present on provocative testing\par ¦ Strength : flexion and extension 4+/5\par ¦ Tests and Signs : Anterior Drawer negative, Lachman negative, Evans's negative\par \par o Peripheral Vascular System :\par ¦ Dorsalis Pedis Artery : pulse 2+ bilaterally\par ¦ Posterior Tibial Artery : pulse 2+ bilaterally \par \par Gait : antalgic on the left, heel/toe on the right, walking without a cane, mild varus deformity on the left, no significant extremity swelling or lymphedema, good proprioception and balance\par \par o Left Knee injection : Indication- knee osteoarthritis, Anatomic location- left intra-articular joint space, Spray - area was sterilized with Betadine and alcohol and anesthetized with Ethyl Chloride , needle used-20G, Medications given- 5cc's lidocaine, 0.5cc's kenalog, 0.5 cc's dexamethasone, and patient tolerated it well.

## 2021-12-13 NOTE — HISTORY OF PRESENT ILLNESS
[de-identified] : 56 year old female patient presents complaining of left knee pain. She received a Durolane injection on 6/24/21 and a cortisone injection on 9/27/21. Her relief from these injections has worn off. She complains of medial sided knee pain and buckling. She is having trouble walking and is falling. She is interested in another Durolane injection but is too early for one today.\par She is 5 months s/p right TKR done 7/20/2021. She used up all of her PT visits. She is diligent with a HEP and thinks her knee is improving every week. She presents ambulating independently.\par \par Radiology Results taken on 11/08/2021:\par o Right Knee : Standing AP, Lateral and skyline views of the knee were obtained and revealed excellent position of her right total knee replacement with central tracking of the patella. \par \par Radiology Results taken on 5/13/2021:\par o Left Knee : Standing AP, lateral and tunnel views of the knee were obtained and revealed significant medial and severe patellofemoral arthritis.

## 2021-12-13 NOTE — DISCUSSION/SUMMARY
[de-identified] : I went over the pathophysiology of the patient's symptoms in great detail with the patient. The patient elected to receive a cortisone injection into her left knee today and tolerated it well. I instructed the patient on ROM exercises and told them to take it easy. The use of ice and rest was reviewed with the patient. The patient may resume activities tomorrow. She is eligible for another Durolane injection any time after 12/24/2021. We are re-requesting authorization for Durolane for the left knee. All of her questions were answered. She understands and consents to the plan. \par \par FU any time after 12/24/2021 for a left knee Durolane injection.\par after a left knee cortisone injection today (12/13/2021). \par \par The patient is an 56 year old female with bone on bone arthritis of their left knee. Based upon the patient's continued symptoms and failure to respond to conservative treatment I have recommended a total knee replacement for this patient. A long discussion took place with the patient describing what a total joint replacement is and what the procedure would entail. A total knee model, similar to the implant that will be used during the operation was utilized to demonstrate and to discuss the various bearing surfaces of the implants. The hospitalization and post-operative care and rehabilitation were also discussed. The use of perioperative antibiotics and DVT prophylaxis were discussed. The risk, benefits and alternatives to a surgical intervention were discussed at length with the patient. The patient was also advised of risks related to the medical comorbidities and elevated body mass index (BMI). A lengthy discussion took place to review the most common complications including but not limited to: deep vein thrombosis, pulmonary embolus, heart attack, stroke, infection, wound breakdown, numbness, damage to nerves, tendon, muscles, arteries or other blood vessels, death and other possible complications from anesthesia. The patient was told that we will take steps to minimize these risks by using sterile technique, antibiotics and DVT prophylaxis when appropriate and follow the patient postoperatively in the office setting to monitor progress. The possibility of recurrent pain, no improvement in pain and actual worsening of pain were also discussed with the patient.\par \par The discharge plan of care focused on the patient going home following surgery. I encouraged the patient to make the necessary arrangements to have someone stay with them when they are discharged home. Following discharge, a home care nurse will visit the patient. They will open your home care case and request home physical therapy services. Home physical therapy will commence following discharge provided it is appropriate and covered by her health insurance benefit plan.\par \par The risks, benefits and alternative treatment options of total joint replacement were reviewed with the patient at great length. All questions were answered to the satisfaction of the patient. The patient participated in an interactive discussion of the total knee replacement implant planned for their surgery with questions answered. The patient agreed with the treatment plan, and has decided to move forward with the elective total knee replacement as planned.\par \par TJ SAMUELS was seen face to face and needs a commode for bedside use as the patient has no ability to acces the bathroom in their home. The patient also requires a rolling walker as this is needed for activities of daily living within their home secondary to the diagnosis of osteoarthritis.

## 2022-01-05 ENCOUNTER — APPOINTMENT (OUTPATIENT)
Dept: ORTHOPEDIC SURGERY | Facility: CLINIC | Age: 57
End: 2022-01-05
Payer: COMMERCIAL

## 2022-01-05 PROCEDURE — 99214 OFFICE O/P EST MOD 30 MIN: CPT | Mod: 25

## 2022-01-05 PROCEDURE — 20611 DRAIN/INJ JOINT/BURSA W/US: CPT | Mod: LT

## 2022-01-05 PROCEDURE — 73562 X-RAY EXAM OF KNEE 3: CPT | Mod: RT

## 2022-01-05 NOTE — PHYSICAL EXAM
[de-identified] : Constitutional\par o Appearance : well-nourished, well developed, alert, in no acute distress \par Head and Face\par o Head :\par ¦ Inspection : atraumatic, normocephalic\par o Face :\par ¦ Inspection : no visible rash or discoloration\par Respiratory\par o Respiratory Effort: breathing unlabored \par Neurologic\par o Mental Status Examination :\par ¦ Orientation : grossly oriented to person, place and time\par Psychiatric\par o Mood and Affect: mood normal, affect appropriate \par \par Right Lower Extremity\par o Buttock : no tenderness, swelling or deformities \par o Right Hip :\par ¦ Inspection/Palpation : no tenderness, swelling or deformities\par ¦ Range of Motion : full and painless in all planes, no crepitance\par ¦ Stability : joint stability intact\par ¦ Strength : extension, flexion, adduction, abduction, internal rotation and external rotation, 4+/5 \par \par o Right Knee :\par ¦ Inspection/Palpation : medial compartment tenderness to palpation, no swelling, incision well-healed\par ¦ Range of Motion : 5-125°, no crepitance, good patellofemoral glide  \par ¦ Stability : no valgus or varus instability present on provocative testing\par ¦ Strength : flexion and extension 4+/5, can SLR, no extensor lag\par ¦ Tests and Signs : negative Anterior Drawer\par \par Left Lower Extremity\par o Buttock : no tenderness, swelling or deformities \par o Left Hip :\par ¦ Inspection/Palpation : no tenderness, no swelling or deformities\par ¦ Range of Motion : full and painless in all planes, no crepitance\par ¦ Stability : joint stability intact\par ¦ Strength : extension, flexion, adduction, abduction, internal rotation and external rotation, 4+/5\par \par o Left Knee :\par ¦ Inspection/Palpation : medial compartment tenderness, medial and lateral facet tenderness of the patella, no swelling, mild varus deformity\par ¦ Range of Motion : 0-130°, pain on full flexion, patellofemoral crepitance, decreased patellofemoral glide\par ¦ Stability : no valgus or varus instability present on provocative testing\par ¦ Strength : flexion and extension 4+/5\par ¦ Tests and Signs : Anterior Drawer negative, Lachman negative, Evans's negative\par \par o Peripheral Vascular System :\par ¦ Dorsalis Pedis Artery : pulse 2+ bilaterally\par ¦ Posterior Tibial Artery : pulse 2+ bilaterally \par \par Gait : antalgic on the left, heel/toe on the right, walking without a cane, mild varus deformity on the left, no significant extremity swelling or lymphedema, good proprioception and balance\par \par Radiology Results:\par o Right Knee : Standing AP, Lateral and skyline views of the knee were obtained and revealed excellent position of her right total knee replacement with central tracking of the patella. \par \par o Left Knee injection : Indication- knee osteoarthritis, Anatomic location- left intra-articular joint space, Spray - area was sterilized with Betadine and alcohol and anesthetized with Ethyl Chloride, needle used-20G, Medications given- 3cc's Durolane under Ultrasound guidance. The patient tolerated the procedure well.  Celestino# [id number]    o Exp: 2023-10-31

## 2022-01-05 NOTE — ADDENDUM
[FreeTextEntry1] : I, Bhanu Harrison, acted solely as a scribe for Dr. eSrgio Ruffin on this date 01/05/2022.\par All medical record entries made by the Scribe were at my, Dr. Sergio Ruffin, direction and personally dictated by me on 01/05/2022. I have reviewed the chart and agree that the record accurately reflects my personal performance of the history, physical exam, assessment and plan. I have also personally directed, reviewed, and agreed with the chart.

## 2022-01-05 NOTE — DISCUSSION/SUMMARY
[de-identified] : I discussed the underlying pathophysiology of the patient's condition in great detail with the patient. I went over the patient's x-rays with them in great detail. At-home stretching exercises were discussed and demonstrated with the patient. The patient elected to receive a Durolane injection into her left knee today, and tolerated it well. I instructed the pt on ROM exercises, and told them to take it easy. The use of ice and rest was reviewed with the patient. The patient may resume activities tomorrow. I reminded the patient that it takes 4 to 6 weeks after the injection to feel symptom relief. I advised her that she needs to consider a left total knee replacement given conservative measures are not providing enough relief. All of her questions were answered. She understands and consents to the plan. \par \par FU 6 weeks.\par after a left knee Durolane injection today (01/05/2022). \par \par The patient is an 56 year old female with bone on bone arthritis of their left knee. Based upon the patient's continued symptoms and failure to respond to conservative treatment I have recommended a total knee replacement for this patient. A long discussion took place with the patient describing what a total joint replacement is and what the procedure would entail. A total knee model, similar to the implant that will be used during the operation was utilized to demonstrate and to discuss the various bearing surfaces of the implants. The hospitalization and post-operative care and rehabilitation were also discussed. The use of perioperative antibiotics and DVT prophylaxis were discussed. The risk, benefits and alternatives to a surgical intervention were discussed at length with the patient. The patient was also advised of risks related to the medical comorbidities and elevated body mass index (BMI). A lengthy discussion took place to review the most common complications including but not limited to: deep vein thrombosis, pulmonary embolus, heart attack, stroke, infection, wound breakdown, numbness, damage to nerves, tendon, muscles, arteries or other blood vessels, death and other possible complications from anesthesia. The patient was told that we will take steps to minimize these risks by using sterile technique, antibiotics and DVT prophylaxis when appropriate and follow the patient postoperatively in the office setting to monitor progress. The possibility of recurrent pain, no improvement in pain and actual worsening of pain were also discussed with the patient.\par \par The discharge plan of care focused on the patient going home following surgery. I encouraged the patient to make the necessary arrangements to have someone stay with them when they are discharged home. Following discharge, a home care nurse will visit the patient. They will open your home care case and request home physical therapy services. Home physical therapy will commence following discharge provided it is appropriate and covered by her health insurance benefit plan.\par \par The risks, benefits and alternative treatment options of total joint replacement were reviewed with the patient at great length. All questions were answered to the satisfaction of the patient. The patient participated in an interactive discussion of the total knee replacement implant planned for their surgery with questions answered. The patient agreed with the treatment plan, and has decided to move forward with the elective total knee replacement as planned.\par \par TJ SAMUELS was seen face to face and needs a commode for bedside use as the patient has no ability to acces the bathroom in their home. The patient also requires a rolling walker as this is needed for activities of daily living within their home secondary to the diagnosis of osteoarthritis.

## 2022-01-05 NOTE — HISTORY OF PRESENT ILLNESS
[de-identified] : 56 year old female patient presents 5.5 months s/p right TKR done on 7/20/2021. She complains of left medial knee pain and buckling. She is having trouble walking and is falling. She had a Durolane injection last June but does not think it helped much or as long as she wanted. She wants to try another Durolane injection. She received a left knee cortisone injection on 12/13/21. She presents for a left knee Durolane injection today (01/05/2022).\par \par Radiology Results taken on 5/13/2021:\par o Left Knee : Standing AP, lateral and tunnel views of the knee were obtained and revealed significant medial and severe patellofemoral arthritis.

## 2022-01-06 RX ORDER — CEPHALEXIN 500 MG/1
500 TABLET ORAL
Qty: 10 | Refills: 0 | Status: ACTIVE | COMMUNITY
Start: 2022-01-06 | End: 1900-01-01

## 2022-02-16 ENCOUNTER — APPOINTMENT (OUTPATIENT)
Dept: ORTHOPEDIC SURGERY | Facility: CLINIC | Age: 57
End: 2022-02-16
Payer: COMMERCIAL

## 2022-02-16 PROCEDURE — 99214 OFFICE O/P EST MOD 30 MIN: CPT

## 2022-02-16 NOTE — HISTORY OF PRESENT ILLNESS
[de-identified] : 56 year old female presents complaining of left medial knee pain and buckling. She is having trouble walking and is falling. She received a left knee cortisone injection on 12/13/2 and a Durolane injection on 1/5/2022. She thinks the injections helped by 30% and she has less medial sided pain. She notes increased pain with the changeable weather. She is 7 months s/p right TKR done on 7/20/2021. Her right knee is doing well.\par \par Radiology Results taken on 1/5/2022:\par o Right Knee : Standing AP, Lateral and skyline views of the knee were obtained and revealed excellent position of her right total knee replacement with central tracking of the patella. \par \par Radiology Results taken on 5/13/2021:\par o Left Knee : Standing AP, lateral and tunnel views of the knee were obtained and revealed significant medial and severe patellofemoral arthritis.

## 2022-02-16 NOTE — PHYSICAL EXAM
[de-identified] : Constitutional\par o Appearance : well-nourished, well developed, alert, in no acute distress \par Head and Face\par o Head :\par ¦ Inspection : atraumatic, normocephalic\par o Face :\par ¦ Inspection : no visible rash or discoloration\par Respiratory\par o Respiratory Effort: breathing unlabored \par Neurologic\par o Mental Status Examination :\par ¦ Orientation : grossly oriented to person, place and time\par Psychiatric\par o Mood and Affect: mood normal, affect appropriate \par \par Right Lower Extremity\par o Buttock : no tenderness, swelling or deformities \par o Right Hip :\par ¦ Inspection/Palpation : no tenderness, swelling or deformities\par ¦ Range of Motion : full and painless in all planes, no crepitance\par ¦ Stability : joint stability intact\par ¦ Strength : extension, flexion, adduction, abduction, internal rotation and external rotation, 4+/5 \par \par o Right Knee :\par ¦ Inspection/Palpation : medial compartment tenderness to palpation, no swelling, incision well-healed\par ¦ Range of Motion : 0-130°, no crepitance, good patellofemoral glide  \par ¦ Stability : no valgus or varus instability present on provocative testing\par ¦ Strength : flexion and extension 4+/5, can SLR, no extensor lag\par ¦ Tests and Signs : negative Anterior Drawer\par \par Left Lower Extremity\par o Buttock : no tenderness, swelling or deformities \par o Left Hip :\par ¦ Inspection/Palpation : no tenderness, no swelling or deformities\par ¦ Range of Motion : full and painless in all planes, no crepitance\par ¦ Stability : joint stability intact\par ¦ Strength : extension, flexion, adduction, abduction, internal rotation and external rotation, 4+/5\par \par o Left Knee :\par ¦ Inspection/Palpation : lateral compartment tenderness, mild medial compartment tenderness, medial and lateral facet tenderness, no swelling, mild varus deformity\par ¦ Range of Motion : 0-140°, pain on full extension and flexion, patellofemoral crepitance, good patellofemoral glide\par ¦ Stability : no valgus or varus instability present on provocative testing\par ¦ Strength : flexion and extension 4+/5\par ¦ Tests and Signs : Anterior Drawer negative, Lachman negative, Evans's negative\par \par o Peripheral Vascular System :\par ¦ Dorsalis Pedis Artery : pulse 2+ bilaterally\par ¦ Posterior Tibial Artery : pulse 2+ bilaterally \par \par Gait : heel/toe  on the left, heel/toe on the right, walking without a cane, mild varus deformity on the left, no significant extremity swelling or lymphedema, good proprioception and balance

## 2022-02-16 NOTE — ADDENDUM
[FreeTextEntry1] : I, Bhanu Harrison, acted solely as a scribe for Dr. Sergio Ruffin on this date 02/16/2022.\par All medical record entries made by the Scribe were at my, Dr. Sergio Ruffin, direction and personally dictated by me on 02/16/2022. I have reviewed the chart and agree that the record accurately reflects my personal performance of the history, physical exam, assessment and plan. I have also personally directed, reviewed, and agreed with the chart.

## 2022-02-16 NOTE — DISCUSSION/SUMMARY
[de-identified] : I went over the pathophysiology of the patient's symptoms in great detail with the patient. We discussed the use of ice, Tylenol and anti-inflammatories to relieve pain. She will follow-up in 2-3 months and we will see how her knee feels with the warmer weather. All of her questions were answered. She understands and consents to the plan.\par \par FU 2-3 months.\par \par The patient is an 56 year old female with bone on bone arthritis of their left knee. Based upon the patient's continued symptoms and failure to respond to conservative treatment I have recommended a total knee replacement for this patient. A long discussion took place with the patient describing what a total joint replacement is and what the procedure would entail. A total knee model, similar to the implant that will be used during the operation was utilized to demonstrate and to discuss the various bearing surfaces of the implants. The hospitalization and post-operative care and rehabilitation were also discussed. The use of perioperative antibiotics and DVT prophylaxis were discussed. The risk, benefits and alternatives to a surgical intervention were discussed at length with the patient. The patient was also advised of risks related to the medical comorbidities and elevated body mass index (BMI). A lengthy discussion took place to review the most common complications including but not limited to: deep vein thrombosis, pulmonary embolus, heart attack, stroke, infection, wound breakdown, numbness, damage to nerves, tendon, muscles, arteries or other blood vessels, death and other possible complications from anesthesia. The patient was told that we will take steps to minimize these risks by using sterile technique, antibiotics and DVT prophylaxis when appropriate and follow the patient postoperatively in the office setting to monitor progress. The possibility of recurrent pain, no improvement in pain and actual worsening of pain were also discussed with the patient.\par \par The discharge plan of care focused on the patient going home following surgery. I encouraged the patient to make the necessary arrangements to have someone stay with them when they are discharged home. Following discharge, a home care nurse will visit the patient. They will open your home care case and request home physical therapy services. Home physical therapy will commence following discharge provided it is appropriate and covered by her health insurance benefit plan.\par \par The risks, benefits and alternative treatment options of total joint replacement were reviewed with the patient at great length. All questions were answered to the satisfaction of the patient. The patient participated in an interactive discussion of the total knee replacement implant planned for their surgery with questions answered. The patient agreed with the treatment plan, and has decided to move forward with the elective total knee replacement as planned.\par \par TJ SAMUELS was seen face to face and needs a commode for bedside use as the patient has no ability to acces the bathroom in their home. The patient also requires a rolling walker as this is needed for activities of daily living within their home secondary to the diagnosis of osteoarthritis.

## 2022-03-29 ENCOUNTER — APPOINTMENT (OUTPATIENT)
Dept: CARDIOLOGY | Facility: CLINIC | Age: 57
End: 2022-03-29
Payer: COMMERCIAL

## 2022-03-29 ENCOUNTER — NON-APPOINTMENT (OUTPATIENT)
Age: 57
End: 2022-03-29

## 2022-03-29 VITALS
TEMPERATURE: 98.5 F | SYSTOLIC BLOOD PRESSURE: 128 MMHG | OXYGEN SATURATION: 98 % | HEART RATE: 60 BPM | HEIGHT: 68 IN | BODY MASS INDEX: 25.76 KG/M2 | WEIGHT: 170 LBS | DIASTOLIC BLOOD PRESSURE: 70 MMHG

## 2022-03-29 DIAGNOSIS — L40.50 ARTHROPATHIC PSORIASIS, UNSPECIFIED: ICD-10-CM

## 2022-03-29 PROCEDURE — 99214 OFFICE O/P EST MOD 30 MIN: CPT

## 2022-03-29 PROCEDURE — 93000 ELECTROCARDIOGRAM COMPLETE: CPT

## 2022-03-29 RX ORDER — BENZONATATE 200 MG/1
200 CAPSULE ORAL
Qty: 20 | Refills: 0 | Status: DISCONTINUED | COMMUNITY
Start: 2020-11-07 | End: 2022-03-29

## 2022-03-29 RX ORDER — AZITHROMYCIN 250 MG/1
250 TABLET, FILM COATED ORAL
Qty: 6 | Refills: 0 | Status: DISCONTINUED | COMMUNITY
Start: 2020-09-12 | End: 2022-03-29

## 2022-03-29 RX ORDER — BENZONATATE 100 MG/1
100 CAPSULE ORAL
Qty: 15 | Refills: 0 | Status: DISCONTINUED | COMMUNITY
Start: 2020-09-12 | End: 2022-03-29

## 2022-03-29 RX ORDER — DUPILUMAB 300 MG/2ML
300 INJECTION, SOLUTION SUBCUTANEOUS
Refills: 0 | Status: DISCONTINUED | COMMUNITY
End: 2022-03-29

## 2022-03-29 RX ORDER — CEFADROXIL 500 MG/1
500 CAPSULE ORAL
Qty: 14 | Refills: 0 | Status: DISCONTINUED | COMMUNITY
Start: 2020-07-06 | End: 2022-03-29

## 2022-03-29 RX ORDER — VORTIOXETINE 10 MG/1
10 TABLET, FILM COATED ORAL
Qty: 30 | Refills: 0 | Status: DISCONTINUED | COMMUNITY
Start: 2020-05-05 | End: 2022-03-29

## 2022-03-29 NOTE — HISTORY OF PRESENT ILLNESS
[FreeTextEntry1] : The patient presents for evaluation of high blood pressure. Patient is currently tolerating the current  antihypertensive regime and they deny headaches, stiff neck, visual changes, pedal Edema or PND. They also are here for follow-up of elevated cholesterol. Patient is currently tolerating medication and denies muscle pain, joint pain, back pain, tea colored urine ,nausea, vomiting, abdominal pain or diarrhea. The patient is trying to follow a low cholesterol diet.\par \par The patient is here for follow-up of hypothyroidism. The patient states that they are taking their medicine regularly that they have no new complaints. They deny any  heat or cold intolerance hair loss and fatigue.\par \par The patient today complains of anxiety type symptoms usually with occasional  somatic complaints, medical workup including bloodwork has been negative in the past as reported by the patient. Anxiety symptoms worsen with stressful situations.\par \par Pt was recently started on Skyrizi for psoriasis. Pt sees Derm Dr. Tan.

## 2022-03-30 LAB
ALBUMIN SERPL ELPH-MCNC: 4.4 G/DL
ALP BLD-CCNC: 69 U/L
ALT SERPL-CCNC: 19 U/L
ANION GAP SERPL CALC-SCNC: 13 MMOL/L
APPEARANCE: CLEAR
AST SERPL-CCNC: 25 U/L
BACTERIA: NEGATIVE
BASOPHILS # BLD AUTO: 0.05 K/UL
BASOPHILS NFR BLD AUTO: 0.7 %
BILIRUB SERPL-MCNC: 0.5 MG/DL
BILIRUBIN URINE: NEGATIVE
BLOOD URINE: NEGATIVE
BUN SERPL-MCNC: 15 MG/DL
CALCIUM SERPL-MCNC: 9.7 MG/DL
CHLORIDE SERPL-SCNC: 102 MMOL/L
CHOLEST SERPL-MCNC: 181 MG/DL
CO2 SERPL-SCNC: 24 MMOL/L
COLOR: NORMAL
COVID-19 SPIKE DOMAIN ANTIBODY INTERPRETATION: POSITIVE
CREAT SERPL-MCNC: 0.53 MG/DL
EGFR: 108 ML/MIN/1.73M2
EOSINOPHIL # BLD AUTO: 0.16 K/UL
EOSINOPHIL NFR BLD AUTO: 2.2 %
ESTIMATED AVERAGE GLUCOSE: 111 MG/DL
GLUCOSE QUALITATIVE U: NEGATIVE
GLUCOSE SERPL-MCNC: 87 MG/DL
HBA1C MFR BLD HPLC: 5.5 %
HCT VFR BLD CALC: 40.7 %
HDLC SERPL-MCNC: 63 MG/DL
HGB BLD-MCNC: 13 G/DL
HYALINE CASTS: 1 /LPF
IGA SER QL IEP: 218 MG/DL
IMM GRANULOCYTES NFR BLD AUTO: 0.3 %
KETONES URINE: NEGATIVE
LDLC SERPL CALC-MCNC: 103 MG/DL
LEUKOCYTE ESTERASE URINE: NEGATIVE
LYMPHOCYTES # BLD AUTO: 2.58 K/UL
LYMPHOCYTES NFR BLD AUTO: 35 %
MAGNESIUM SERPL-MCNC: 2.2 MG/DL
MAN DIFF?: NORMAL
MCHC RBC-ENTMCNC: 31.9 GM/DL
MCHC RBC-ENTMCNC: 31.9 PG
MCV RBC AUTO: 99.8 FL
MICROSCOPIC-UA: NORMAL
MONOCYTES # BLD AUTO: 0.7 K/UL
MONOCYTES NFR BLD AUTO: 9.5 %
NEUTROPHILS # BLD AUTO: 3.86 K/UL
NEUTROPHILS NFR BLD AUTO: 52.3 %
NITRITE URINE: NEGATIVE
NONHDLC SERPL-MCNC: 118 MG/DL
PH URINE: 6
PHOSPHATE SERPL-MCNC: 2.6 MG/DL
PLATELET # BLD AUTO: 352 K/UL
POTASSIUM SERPL-SCNC: 3.8 MMOL/L
PROT SERPL-MCNC: 7.1 G/DL
PROTEIN URINE: NEGATIVE
RBC # BLD: 4.08 M/UL
RBC # FLD: 12.2 %
RED BLOOD CELLS URINE: 2 /HPF
SARS-COV-2 AB SERPL IA-ACNC: >250 U/ML
SODIUM SERPL-SCNC: 139 MMOL/L
SPECIFIC GRAVITY URINE: 1.02
SQUAMOUS EPITHELIAL CELLS: 1 /HPF
T4 FREE SERPL-MCNC: 1.2 NG/DL
TRIGL SERPL-MCNC: 72 MG/DL
TSH SERPL-ACNC: 1.94 UIU/ML
URATE SERPL-MCNC: 2.9 MG/DL
UROBILINOGEN URINE: NORMAL
WBC # FLD AUTO: 7.37 K/UL
WHITE BLOOD CELLS URINE: 1 /HPF

## 2022-04-01 DIAGNOSIS — R76.8 OTHER SPECIFIED ABNORMAL IMMUNOLOGICAL FINDINGS IN SERUM: ICD-10-CM

## 2022-04-01 LAB
ENDOMYSIUM IGA SER QL: POSITIVE
ENDOMYSIUM IGA TITR SER: ABNORMAL
GLIADIN IGA SER QL: 131.8 UNITS
GLIADIN IGG SER QL: 77.6 UNITS
GLIADIN PEPTIDE IGA SER-ACNC: POSITIVE
GLIADIN PEPTIDE IGG SER-ACNC: POSITIVE
TTG IGA SER IA-ACNC: >100 U/ML
TTG IGA SER-ACNC: POSITIVE
TTG IGG SER IA-ACNC: 1.3 U/ML
TTG IGG SER IA-ACNC: NEGATIVE

## 2022-04-05 ENCOUNTER — APPOINTMENT (OUTPATIENT)
Dept: CARDIOLOGY | Facility: CLINIC | Age: 57
End: 2022-04-05

## 2022-05-16 ENCOUNTER — APPOINTMENT (OUTPATIENT)
Dept: ORTHOPEDIC SURGERY | Facility: CLINIC | Age: 57
End: 2022-05-16
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
## 2022-06-30 ENCOUNTER — APPOINTMENT (OUTPATIENT)
Dept: CARDIOLOGY | Facility: CLINIC | Age: 57
End: 2022-06-30

## 2022-06-30 DIAGNOSIS — Z00.00 ENCOUNTER FOR GENERAL ADULT MEDICAL EXAMINATION W/OUT ABNORMAL FINDINGS: ICD-10-CM

## 2022-07-06 ENCOUNTER — APPOINTMENT (OUTPATIENT)
Dept: ORTHOPEDIC SURGERY | Facility: CLINIC | Age: 57
End: 2022-07-06

## 2022-07-06 PROCEDURE — 99214 OFFICE O/P EST MOD 30 MIN: CPT | Mod: 25

## 2022-07-06 PROCEDURE — 20610 DRAIN/INJ JOINT/BURSA W/O US: CPT | Mod: LT

## 2022-07-06 PROCEDURE — 73562 X-RAY EXAM OF KNEE 3: CPT | Mod: RT

## 2022-07-06 PROCEDURE — 73564 X-RAY EXAM KNEE 4 OR MORE: CPT | Mod: LT

## 2022-07-06 NOTE — DISCUSSION/SUMMARY
[de-identified] : I discussed the underlying pathophysiology of the patient's condition in great detail with the patient. I went over the patient's x-rays with them in great detail. I informed her that injections such as cortisone and viscosupplementation are short term solutions, and that surgery will be required to provide her with long term relief. Patient understands she needs to consider knee replacement given she is losing motion and conservative measures are not providing enough relief. She will schedule surgery for some time in November. \par The patient elected to receive a cortisone injection into her left knee today and tolerated it well. I instructed the patient on ROM exercises and told them to take it easy. The use of ice and rest was reviewed with the patient. The patient may resume activities tomorrow. All of her questions were answered. She understands and consents to the plan.\par \par FU in 1 month.\par after a left knee cortisone injection today (07/06/2022). \par \par The patient is an 57 year old female with bone on bone arthritis of their left knee. Based upon the patient's continued symptoms and failure to respond to conservative treatment I have recommended a total knee replacement for this patient. A long discussion took place with the patient describing what a total joint replacement is and what the procedure would entail. A total knee model, similar to the implant that will be used during the operation was utilized to demonstrate and to discuss the various bearing surfaces of the implants. The hospitalization and post-operative care and rehabilitation were also discussed. The use of perioperative antibiotics and DVT prophylaxis were discussed. The risk, benefits and alternatives to a surgical intervention were discussed at length with the patient. The patient was also advised of risks related to the medical comorbidities and elevated body mass index (BMI). A lengthy discussion took place to review the most common complications including but not limited to: deep vein thrombosis, pulmonary embolus, heart attack, stroke, infection, wound breakdown, numbness, damage to nerves, tendon, muscles, arteries or other blood vessels, death and other possible complications from anesthesia. The patient was told that we will take steps to minimize these risks by using sterile technique, antibiotics and DVT prophylaxis when appropriate and follow the patient postoperatively in the office setting to monitor progress. The possibility of recurrent pain, no improvement in pain and actual worsening of pain were also discussed with the patient.\par \par The discharge plan of care focused on the patient going home following surgery. I encouraged the patient to make the necessary arrangements to have someone stay with them when they are discharged home. Following discharge, a home care nurse will visit the patient. They will open your home care case and request home physical therapy services. Home physical therapy will commence following discharge provided it is appropriate and covered by her health insurance benefit plan.\par \par The risks, benefits and alternative treatment options of total joint replacement were reviewed with the patient at great length. All questions were answered to the satisfaction of the patient. The patient participated in an interactive discussion of the total knee replacement implant planned for their surgery with questions answered. The patient agreed with the treatment plan, and has decided to move forward with the elective total knee replacement as planned.\par \par TJ SAMUELS was seen face to face and needs a commode for bedside use as the patient has no ability to acces the bathroom in their home. The patient also requires a rolling walker as this is needed for activities of daily living within their home secondary to the diagnosis of osteoarthritis.

## 2022-07-06 NOTE — PHYSICAL EXAM
[de-identified] : Constitutional\par o Appearance : well-nourished, well developed, alert, in no acute distress \par Head and Face\par o Head :\par ¦ Inspection : atraumatic, normocephalic\par o Face :\par ¦ Inspection : no visible rash or discoloration\par Respiratory\par o Respiratory Effort: breathing unlabored \par Neurologic\par o Mental Status Examination :\par ¦ Orientation : grossly oriented to person, place and time\par Psychiatric\par o Mood and Affect: mood normal, affect appropriate \par \par Right Lower Extremity\par o Buttock : no tenderness, swelling or deformities \par o Right Hip :\par ¦ Inspection/Palpation : no tenderness, swelling or deformities\par ¦ Range of Motion : full and painless in all planes, no crepitance\par ¦ Stability : joint stability intact\par ¦ Strength : extension, flexion, adduction, abduction, internal rotation and external rotation, 4+/5 \par \par o Right Knee :\par ¦ Inspection/Palpation : medial compartment tenderness to palpation, no swelling, incision well-healed, good alignment\par ¦ Range of Motion : 0-130°, no crepitance, good patellofemoral glide  \par ¦ Stability : no valgus or varus instability present on provocative testing\par ¦ Strength : flexion and extension 4+/5, can SLR, no extensor lag\par ¦ Tests and Signs : negative Anterior Drawer\par \par Left Lower Extremity\par o Buttock : no tenderness, swelling or deformities \par o Left Hip :\par ¦ Inspection/Palpation : no tenderness, no swelling or deformities\par ¦ Range of Motion : full and painless in all planes, no crepitance\par ¦ Stability : joint stability intact\par ¦ Strength : extension, flexion, adduction, abduction, internal rotation and external rotation, 4+/5\par \par o Left Knee :\par ¦ Inspection/Palpation : lateral compartment tenderness, medial compartment tenderness, medial and lateral facet tenderness, no swelling, mild varus deformity\par ¦ Range of Motion : 0-120°, pain on full extension and flexion, patellofemoral crepitance, good patellofemoral glide\par ¦ Stability : gross valgus / varus instability present on provocative testing\par ¦ Strength : flexion and extension 4+/5\par ¦ Tests and Signs : Anterior Drawer negative, Lachman negative, Evans's negative\par \par o Peripheral Vascular System :\par ¦ Dorsalis Pedis Artery : pulse 2+ bilaterally\par ¦ Posterior Tibial Artery : pulse 2+ bilaterally \par \par Gait : heel/toe on the left, heel/toe on the right, walking without a cane, mild varus deformity on the left, no significant extremity swelling or lymphedema, good proprioception and balance\par \par Radiology Results:\par o Right Knee : Standing AP, Lateral and skyline views of the knee were obtained and revealed excellent position of her right total knee replacement with central tracking of the patella. \par o Left Knee : Standing AP, lateral, tunnel, and skyline views of the knee were obtained and revealed bone on bone in the medial compartment with severe patellofemoral arthritis. \par \par o Left Knee injection : Indication- knee osteoarthritis, Anatomic location- left intra-articular joint space, Spray - area was sterilized with Betadine and alcohol and anesthetized with Ethyl Chloride , needle used-20G, Medications given- 5cc's lidocaine, 0.5cc's kenalog, 0.5 cc's dexamethasone, and patient tolerated it well.

## 2022-07-06 NOTE — HISTORY OF PRESENT ILLNESS
[de-identified] : 57 year old female presents complaining of constant left knee pain, even at rest. She notes pain on the lateral aspect that shoots down the calf. She notes locking when she stands up straight and has difficulty bending the knee after. Her knee does buckle. She has difficulty when ascending and descending stairs. She received a left knee cortisone injection on 12/13/21 and a Durolane injection on 01/5/22. She thinks the injections helped by about 30%. She notes increased pain with the changeable weather. She is 1 year s/p right TKR 7/20/2021. Her right knee is doing well. Information could not be obtained

## 2022-07-07 ENCOUNTER — APPOINTMENT (OUTPATIENT)
Dept: ORTHOPEDIC SURGERY | Facility: CLINIC | Age: 57
End: 2022-07-07

## 2022-07-07 VITALS — BODY MASS INDEX: 28.12 KG/M2 | WEIGHT: 175 LBS | HEIGHT: 66 IN

## 2022-07-07 DIAGNOSIS — M25.531 PAIN IN RIGHT WRIST: ICD-10-CM

## 2022-07-07 PROCEDURE — 99213 OFFICE O/P EST LOW 20 MIN: CPT

## 2022-07-07 PROCEDURE — 73110 X-RAY EXAM OF WRIST: CPT | Mod: RT

## 2022-07-07 NOTE — HISTORY OF PRESENT ILLNESS
[Right] : right hand dominant [FreeTextEntry1] : Pt is a 56 y/o female with right wrist pain x 3 weeks.  She states that she noticed the pain after driving a lot and doing household chores.  Denies specific injury.  She bought a right wrist brace from [employer] which has helped.  She has pain with extension of the wrist.  It is tender to touch.  She has difficulty lifting heavy objects.  She is not currently taking anything for pain. Notes numbness and tingling index and middle fingers.\par \par Notes she has previously been diagnosed with CTS, about 6 years ago. She had a steroid injection at that time with relief.

## 2022-07-07 NOTE — PHYSICAL EXAM
[de-identified] : Patient is WDWN, alert, and in no acute distress. Breathing is unlabored. She is grossly oriented to person, place, and time.\par \par Right Wrist:\par No ecchymosis\par No swelling\par ROM Full\par Pain with extension\par No thenar atrophy\par +Tinel's at the carpal tunnel [de-identified] : 3 views of the right wrist were obtained today that show no fracture, or dislocation. There are mild degenerative changes of the radiocarpal joint seen. There is no malalignment. No obvious osseous abnormality. Otherwise unremarkable.

## 2022-07-07 NOTE — DISCUSSION/SUMMARY
[FreeTextEntry1] : The underlying pathophysiology was reviewed with the patient. XR films were reviewed with the patient. Discussed at length the nature of the patient’s condition. The symptoms appear secondary to carpal tunnel syndrome.\par \par The underlying pathophysiology was reviewed with the patient. Treatment options were discussed including;  surgical intervention.\par \par   The patient wishes to proceed with carpal tunnel release at this time. The risks and benefits were reviewed with the patient.  All of her questions were answered. She will meet with our surgical scheduler.\par \par All questions answered, understanding verbalized. Patient in agreement with plan of care. Follow up in

## 2022-07-12 ENCOUNTER — OUTPATIENT (OUTPATIENT)
Dept: OUTPATIENT SERVICES | Facility: HOSPITAL | Age: 57
LOS: 1 days | End: 2022-07-12
Payer: COMMERCIAL

## 2022-07-12 VITALS
DIASTOLIC BLOOD PRESSURE: 85 MMHG | HEART RATE: 69 BPM | OXYGEN SATURATION: 99 % | TEMPERATURE: 98 F | HEIGHT: 66 IN | SYSTOLIC BLOOD PRESSURE: 123 MMHG | WEIGHT: 175.93 LBS

## 2022-07-12 DIAGNOSIS — Z98.890 OTHER SPECIFIED POSTPROCEDURAL STATES: Chronic | ICD-10-CM

## 2022-07-12 DIAGNOSIS — G56.01 CARPAL TUNNEL SYNDROME, RIGHT UPPER LIMB: ICD-10-CM

## 2022-07-12 DIAGNOSIS — Z96.651 PRESENCE OF RIGHT ARTIFICIAL KNEE JOINT: Chronic | ICD-10-CM

## 2022-07-12 DIAGNOSIS — Z01.818 ENCOUNTER FOR OTHER PREPROCEDURAL EXAMINATION: ICD-10-CM

## 2022-07-12 DIAGNOSIS — Z98.51 TUBAL LIGATION STATUS: Chronic | ICD-10-CM

## 2022-07-12 DIAGNOSIS — Z98.82 BREAST IMPLANT STATUS: Chronic | ICD-10-CM

## 2022-07-12 LAB
ALBUMIN SERPL ELPH-MCNC: 4 G/DL — SIGNIFICANT CHANGE UP (ref 3.3–5)
ALP SERPL-CCNC: 73 U/L — SIGNIFICANT CHANGE UP (ref 30–120)
ALT FLD-CCNC: 32 U/L DA — SIGNIFICANT CHANGE UP (ref 10–60)
ANION GAP SERPL CALC-SCNC: 6 MMOL/L — SIGNIFICANT CHANGE UP (ref 5–17)
AST SERPL-CCNC: 18 U/L — SIGNIFICANT CHANGE UP (ref 10–40)
BILIRUB SERPL-MCNC: 0.6 MG/DL — SIGNIFICANT CHANGE UP (ref 0.2–1.2)
BUN SERPL-MCNC: 20 MG/DL — SIGNIFICANT CHANGE UP (ref 7–23)
CALCIUM SERPL-MCNC: 9.4 MG/DL — SIGNIFICANT CHANGE UP (ref 8.4–10.5)
CHLORIDE SERPL-SCNC: 107 MMOL/L — SIGNIFICANT CHANGE UP (ref 96–108)
CO2 SERPL-SCNC: 29 MMOL/L — SIGNIFICANT CHANGE UP (ref 22–31)
CREAT SERPL-MCNC: 0.68 MG/DL — SIGNIFICANT CHANGE UP (ref 0.5–1.3)
EGFR: 101 ML/MIN/1.73M2 — SIGNIFICANT CHANGE UP
GLUCOSE SERPL-MCNC: 99 MG/DL — SIGNIFICANT CHANGE UP (ref 70–99)
HCT VFR BLD CALC: 43.9 % — SIGNIFICANT CHANGE UP (ref 34.5–45)
HGB BLD-MCNC: 14.4 G/DL — SIGNIFICANT CHANGE UP (ref 11.5–15.5)
MCHC RBC-ENTMCNC: 31.9 PG — SIGNIFICANT CHANGE UP (ref 27–34)
MCHC RBC-ENTMCNC: 32.8 GM/DL — SIGNIFICANT CHANGE UP (ref 32–36)
MCV RBC AUTO: 97.3 FL — SIGNIFICANT CHANGE UP (ref 80–100)
NRBC # BLD: 0 /100 WBCS — SIGNIFICANT CHANGE UP (ref 0–0)
PLATELET # BLD AUTO: 393 K/UL — SIGNIFICANT CHANGE UP (ref 150–400)
POTASSIUM SERPL-MCNC: 4.5 MMOL/L — SIGNIFICANT CHANGE UP (ref 3.5–5.3)
POTASSIUM SERPL-SCNC: 4.5 MMOL/L — SIGNIFICANT CHANGE UP (ref 3.5–5.3)
PROT SERPL-MCNC: 7.7 G/DL — SIGNIFICANT CHANGE UP (ref 6–8.3)
RBC # BLD: 4.51 M/UL — SIGNIFICANT CHANGE UP (ref 3.8–5.2)
RBC # FLD: 11.9 % — SIGNIFICANT CHANGE UP (ref 10.3–14.5)
SODIUM SERPL-SCNC: 142 MMOL/L — SIGNIFICANT CHANGE UP (ref 135–145)
WBC # BLD: 7.98 K/UL — SIGNIFICANT CHANGE UP (ref 3.8–10.5)
WBC # FLD AUTO: 7.98 K/UL — SIGNIFICANT CHANGE UP (ref 3.8–10.5)

## 2022-07-12 PROCEDURE — 80053 COMPREHEN METABOLIC PANEL: CPT

## 2022-07-12 PROCEDURE — 93005 ELECTROCARDIOGRAM TRACING: CPT

## 2022-07-12 PROCEDURE — 93010 ELECTROCARDIOGRAM REPORT: CPT

## 2022-07-12 PROCEDURE — 36415 COLL VENOUS BLD VENIPUNCTURE: CPT

## 2022-07-12 PROCEDURE — 85027 COMPLETE CBC AUTOMATED: CPT

## 2022-07-12 PROCEDURE — G0463: CPT

## 2022-07-12 RX ORDER — AMLODIPINE AND VALSARTAN 5; 320 MG/1; MG/1
1 TABLET, FILM COATED ORAL
Qty: 0 | Refills: 0 | DISCHARGE

## 2022-07-12 RX ORDER — ATORVASTATIN CALCIUM 80 MG/1
1 TABLET, FILM COATED ORAL
Qty: 0 | Refills: 0 | DISCHARGE

## 2022-07-12 RX ORDER — LEVOTHYROXINE SODIUM 125 MCG
1 TABLET ORAL
Qty: 0 | Refills: 0 | DISCHARGE

## 2022-07-12 RX ORDER — PANTOPRAZOLE SODIUM 20 MG/1
1 TABLET, DELAYED RELEASE ORAL
Qty: 0 | Refills: 0 | DISCHARGE

## 2022-07-12 RX ORDER — METOPROLOL TARTRATE 50 MG
1 TABLET ORAL
Qty: 0 | Refills: 0 | DISCHARGE

## 2022-07-12 RX ORDER — VORTIOXETINE 5 MG/1
1 TABLET, FILM COATED ORAL
Qty: 0 | Refills: 0 | DISCHARGE

## 2022-07-12 RX ORDER — DUPILUMAB 300 MG/2ML
0 INJECTION, SOLUTION SUBCUTANEOUS
Qty: 0 | Refills: 0 | DISCHARGE

## 2022-07-12 NOTE — H&P PST ADULT - HISTORY OF PRESENT ILLNESS
56 yo female reports right wrist pain which has exacerbated last week.  She is using a wrist brace to stabilize.  She is scheduled for right carpal tunnel release on 7/22/20220 @ [facility].

## 2022-07-12 NOTE — H&P PST ADULT - PROBLEM SELECTOR PLAN 1
Right carpal tunnel release is planned for 7/22/2022  Covid testing scheduled for 7/20/2022 @ [facility]  Diagnostic testing performed; medical clearance requested.  patient will call prescriber for dupixent instructions  pre op instructions were reviewed; best wishes offered

## 2022-07-12 NOTE — H&P PST ADULT - MUSCULOSKELETAL
details… no joint swelling/no joint erythema/no calf tenderness/strength 5/5 bilateral upper extremities/strength 5/5 bilateral lower extremities/extremities exam

## 2022-07-12 NOTE — H&P PST ADULT - VENOUS THROMBOEMBOLISM CURRENT STATUS
(1) other risk factor (includes escalating BMI, pack-years of smoking, diabetes requiring insulin, chemotherapy, female gender and length of surgery) Patient has little warts on his hands.  Mom is wondering how she can treat it with over the counter medication.  She can be reached at above number.

## 2022-07-12 NOTE — H&P PST ADULT - NSICDXPASTSURGICALHX_GEN_ALL_CORE_FT
PAST SURGICAL HISTORY:  H/O breast implant ,  exchanged    H/O lumpectomy , left    H/O tubal ligation      History of right knee joint replacement     S/P surgical removal of pilonidal cyst     Status post right foot surgery

## 2022-07-19 ENCOUNTER — APPOINTMENT (OUTPATIENT)
Dept: CARDIOLOGY | Facility: CLINIC | Age: 57
End: 2022-07-19

## 2022-07-19 VITALS
TEMPERATURE: 98.6 F | HEIGHT: 66 IN | HEART RATE: 75 BPM | OXYGEN SATURATION: 97 % | DIASTOLIC BLOOD PRESSURE: 60 MMHG | BODY MASS INDEX: 29.15 KG/M2 | SYSTOLIC BLOOD PRESSURE: 100 MMHG | WEIGHT: 181.38 LBS

## 2022-07-19 DIAGNOSIS — R01.1 CARDIAC MURMUR, UNSPECIFIED: ICD-10-CM

## 2022-07-19 PROBLEM — H91.90 UNSPECIFIED HEARING LOSS, UNSPECIFIED EAR: Chronic | Status: ACTIVE | Noted: 2022-07-12

## 2022-07-19 PROBLEM — Z28.9 IMMUNIZATION NOT CARRIED OUT FOR UNSPECIFIED REASON: Chronic | Status: ACTIVE | Noted: 2022-07-12

## 2022-07-19 PROBLEM — G56.01 CARPAL TUNNEL SYNDROME, RIGHT UPPER LIMB: Chronic | Status: ACTIVE | Noted: 2022-07-12

## 2022-07-19 PROBLEM — L40.9 PSORIASIS, UNSPECIFIED: Chronic | Status: ACTIVE | Noted: 2022-07-12

## 2022-07-19 PROCEDURE — 93306 TTE W/DOPPLER COMPLETE: CPT

## 2022-07-19 PROCEDURE — 99214 OFFICE O/P EST MOD 30 MIN: CPT | Mod: 25

## 2022-07-19 PROCEDURE — 93000 ELECTROCARDIOGRAM COMPLETE: CPT | Mod: NC

## 2022-07-20 ENCOUNTER — OUTPATIENT (OUTPATIENT)
Dept: OUTPATIENT SERVICES | Facility: HOSPITAL | Age: 57
LOS: 1 days | End: 2022-07-20
Payer: COMMERCIAL

## 2022-07-20 ENCOUNTER — NON-APPOINTMENT (OUTPATIENT)
Age: 57
End: 2022-07-20

## 2022-07-20 DIAGNOSIS — Z98.890 OTHER SPECIFIED POSTPROCEDURAL STATES: Chronic | ICD-10-CM

## 2022-07-20 DIAGNOSIS — Z20.828 CONTACT WITH AND (SUSPECTED) EXPOSURE TO OTHER VIRAL COMMUNICABLE DISEASES: ICD-10-CM

## 2022-07-20 DIAGNOSIS — Z98.82 BREAST IMPLANT STATUS: Chronic | ICD-10-CM

## 2022-07-20 DIAGNOSIS — Z96.651 PRESENCE OF RIGHT ARTIFICIAL KNEE JOINT: Chronic | ICD-10-CM

## 2022-07-20 DIAGNOSIS — Z98.51 TUBAL LIGATION STATUS: Chronic | ICD-10-CM

## 2022-07-20 LAB — SARS-COV-2 RNA SPEC QL NAA+PROBE: SIGNIFICANT CHANGE UP

## 2022-07-20 PROCEDURE — U0005: CPT

## 2022-07-20 PROCEDURE — U0003: CPT

## 2022-07-21 ENCOUNTER — TRANSCRIPTION ENCOUNTER (OUTPATIENT)
Age: 57
End: 2022-07-21

## 2022-07-21 NOTE — HISTORY OF PRESENT ILLNESS
[FreeTextEntry1] : I was asked to see this delightful patient today for Pre-op Evaluation  prior to right wrist carpal tunnel surgery  with   Dr. French at fax number   _______  .  The patient denies fever, cough, wheezing, sputum production, hemoptysis, dyspnea, congestion, diarrhea, constipation, nausea, vomiting, bright red blood per rectum, melena, angina, chest pain, palpitations, diaphoresis, PND, incontinence, frequency, urgency, dysuria, edema, joint pain, headache, weakness, numbness and dizziness. The date of the planned procedure is: 7/22/2022\par \par The patient presents for evaluation of high blood pressure. Patient is currently tolerating the current  antihypertensive regime and they deny headaches, stiff neck, visual changes, pedal Edema or PND. They also are here for follow-up of elevated cholesterol. Patient is currently tolerating medication and denies muscle pain, joint pain, back pain, tea colored urine ,nausea, vomiting, abdominal pain or diarrhea. The patient is trying to follow a low cholesterol diet.\par \par The patient is here for follow-up of hypothyroidism. The patient states that they are taking their medicine regularly that they have no new complaints. They deny any  heat or cold intolerance hair loss and fatigue.\par \par The patient today complains of anxiety type symptoms usually with occasional  somatic complaints, medical workup including bloodwork has been negative in the past as reported by the patient. Anxiety symptoms worsen with stressful situations.\par

## 2022-07-21 NOTE — ASU PATIENT PROFILE, ADULT - NSICDXPASTMEDICALHX_GEN_ALL_CORE_FT
PAST MEDICAL HISTORY:  Anxiety and depression     COVID-19 vaccination not done     Eczema hands and feet    Fibromyalgia     GERD (gastroesophageal reflux disease)     H/O connective tissue disease     Hearing loss     History of 2019 novel coronavirus disease (COVID-19) 11/2020; no complication    Hypercholesterolemia     Hypertension     Hypothyroidism     Neutrophilic dermatosis     Psoriasis     Right carpal tunnel syndrome

## 2022-07-22 ENCOUNTER — TRANSCRIPTION ENCOUNTER (OUTPATIENT)
Age: 57
End: 2022-07-22

## 2022-07-22 ENCOUNTER — APPOINTMENT (OUTPATIENT)
Dept: ORTHOPEDIC SURGERY | Facility: HOSPITAL | Age: 57
End: 2022-07-22

## 2022-07-22 ENCOUNTER — OUTPATIENT (OUTPATIENT)
Dept: OUTPATIENT SERVICES | Facility: HOSPITAL | Age: 57
LOS: 1 days | End: 2022-07-22
Payer: COMMERCIAL

## 2022-07-22 VITALS
HEART RATE: 76 BPM | SYSTOLIC BLOOD PRESSURE: 106 MMHG | WEIGHT: 181 LBS | RESPIRATION RATE: 15 BRPM | DIASTOLIC BLOOD PRESSURE: 72 MMHG | HEIGHT: 66.5 IN | TEMPERATURE: 99 F | OXYGEN SATURATION: 97 %

## 2022-07-22 VITALS
RESPIRATION RATE: 14 BRPM | OXYGEN SATURATION: 100 % | DIASTOLIC BLOOD PRESSURE: 71 MMHG | SYSTOLIC BLOOD PRESSURE: 97 MMHG | HEART RATE: 65 BPM

## 2022-07-22 DIAGNOSIS — Z98.890 OTHER SPECIFIED POSTPROCEDURAL STATES: Chronic | ICD-10-CM

## 2022-07-22 DIAGNOSIS — Z98.51 TUBAL LIGATION STATUS: Chronic | ICD-10-CM

## 2022-07-22 DIAGNOSIS — Z96.651 PRESENCE OF RIGHT ARTIFICIAL KNEE JOINT: Chronic | ICD-10-CM

## 2022-07-22 DIAGNOSIS — G56.01 CARPAL TUNNEL SYNDROME, RIGHT UPPER LIMB: ICD-10-CM

## 2022-07-22 DIAGNOSIS — Z98.82 BREAST IMPLANT STATUS: Chronic | ICD-10-CM

## 2022-07-22 LAB
ALBUMIN SERPL ELPH-MCNC: 4.5 G/DL
ALP BLD-CCNC: 75 U/L
ALT SERPL-CCNC: 19 U/L
ANION GAP SERPL CALC-SCNC: 13 MMOL/L
APO B SERPL-MCNC: 105 MG/DL
AST SERPL-CCNC: 28 U/L
BASOPHILS # BLD AUTO: 0.05 K/UL
BASOPHILS NFR BLD AUTO: 0.5 %
BILIRUB DIRECT SERPL-MCNC: 0.2 MG/DL
BILIRUB INDIRECT SERPL-MCNC: 0.4 MG/DL
BILIRUB SERPL-MCNC: 0.5 MG/DL
BUN SERPL-MCNC: 23 MG/DL
CALCIUM SERPL-MCNC: 10.1 MG/DL
CHLORIDE SERPL-SCNC: 100 MMOL/L
CHOLEST SERPL-MCNC: 237 MG/DL
CK SERPL-CCNC: 50 U/L
CO2 SERPL-SCNC: 22 MMOL/L
COVID-19 NUCLEOCAPSID  GAM ANTIBODY INTERPRETATION: POSITIVE
COVID-19 SPIKE DOMAIN ANTIBODY INTERPRETATION: POSITIVE
CREAT SERPL-MCNC: 0.68 MG/DL
EGFR: 101 ML/MIN/1.73M2
EOSINOPHIL # BLD AUTO: 0.15 K/UL
EOSINOPHIL NFR BLD AUTO: 1.6 %
ESTIMATED AVERAGE GLUCOSE: 120 MG/DL
GLUCOSE SERPL-MCNC: 82 MG/DL
HBA1C MFR BLD HPLC: 5.8 %
HCT VFR BLD CALC: 46.3 %
HDLC SERPL-MCNC: 70 MG/DL
HGB BLD-MCNC: 15 G/DL
IMM GRANULOCYTES NFR BLD AUTO: 0.3 %
LDLC SERPL CALC-MCNC: 149 MG/DL
LDLC SERPL DIRECT ASSAY-MCNC: 143 MG/DL
LYMPHOCYTES # BLD AUTO: 2.53 K/UL
LYMPHOCYTES NFR BLD AUTO: 26.9 %
MAN DIFF?: NORMAL
MCHC RBC-ENTMCNC: 31.6 PG
MCHC RBC-ENTMCNC: 32.4 GM/DL
MCV RBC AUTO: 97.7 FL
MONOCYTES # BLD AUTO: 0.81 K/UL
MONOCYTES NFR BLD AUTO: 8.6 %
NEUTROPHILS # BLD AUTO: 5.83 K/UL
NEUTROPHILS NFR BLD AUTO: 62.1 %
NONHDLC SERPL-MCNC: 167 MG/DL
PLATELET # BLD AUTO: 392 K/UL
POTASSIUM SERPL-SCNC: 4.6 MMOL/L
PROT SERPL-MCNC: 7.7 G/DL
RBC # BLD: 4.74 M/UL
RBC # FLD: 12.1 %
SARS-COV-2 AB SERPL IA-ACNC: >250 U/ML
SARS-COV-2 AB SERPL QL IA: 46.2 INDEX
SODIUM SERPL-SCNC: 135 MMOL/L
TRIGL SERPL-MCNC: 89 MG/DL
WBC # FLD AUTO: 9.4 K/UL

## 2022-07-22 PROCEDURE — 64721 CARPAL TUNNEL SURGERY: CPT | Mod: RT

## 2022-07-22 RX ORDER — HYDROMORPHONE HYDROCHLORIDE 2 MG/ML
0.5 INJECTION INTRAMUSCULAR; INTRAVENOUS; SUBCUTANEOUS
Refills: 0 | Status: DISCONTINUED | OUTPATIENT
Start: 2022-07-22 | End: 2022-07-22

## 2022-07-22 RX ORDER — SODIUM CHLORIDE 9 MG/ML
1000 INJECTION, SOLUTION INTRAVENOUS
Refills: 0 | Status: DISCONTINUED | OUTPATIENT
Start: 2022-07-22 | End: 2022-07-22

## 2022-07-22 RX ORDER — CEFAZOLIN SODIUM 1 G
2000 VIAL (EA) INJECTION ONCE
Refills: 0 | Status: COMPLETED | OUTPATIENT
Start: 2022-07-22 | End: 2022-07-22

## 2022-07-22 RX ORDER — IBUPROFEN 200 MG
1 TABLET ORAL
Qty: 30 | Refills: 0
Start: 2022-07-22

## 2022-07-22 RX ORDER — CHLORHEXIDINE GLUCONATE 213 G/1000ML
1 SOLUTION TOPICAL ONCE
Refills: 0 | Status: COMPLETED | OUTPATIENT
Start: 2022-07-22 | End: 2022-07-22

## 2022-07-22 RX ORDER — OXYCODONE HYDROCHLORIDE 5 MG/1
5 TABLET ORAL ONCE
Refills: 0 | Status: DISCONTINUED | OUTPATIENT
Start: 2022-07-22 | End: 2022-07-22

## 2022-07-22 RX ORDER — ONDANSETRON 8 MG/1
4 TABLET, FILM COATED ORAL ONCE
Refills: 0 | Status: DISCONTINUED | OUTPATIENT
Start: 2022-07-22 | End: 2022-07-22

## 2022-07-22 RX ADMIN — SODIUM CHLORIDE 75 MILLILITER(S): 9 INJECTION, SOLUTION INTRAVENOUS at 09:15

## 2022-07-22 RX ADMIN — CHLORHEXIDINE GLUCONATE 1 APPLICATION(S): 213 SOLUTION TOPICAL at 06:40

## 2022-07-22 NOTE — ASU PREOP CHECKLIST - LATEX ALLERGY
----- Message from Kian Mares MD sent at 1/21/2021  5:56 AM CST -----  Labs reviewed, we will discuss labs in detail at the upcoming appointment.   no

## 2022-07-22 NOTE — ASU DISCHARGE PLAN (ADULT/PEDIATRIC) - CARE PROVIDER_API CALL
Toro French)  Orthopaedic Surgery  [address]  [place] [address]  Phone: [phone]  Fax: [phone]  Follow Up Time:

## 2022-07-22 NOTE — ASU DISCHARGE PLAN (ADULT/PEDIATRIC) - ASU DC SPECIAL INSTRUCTIONSFT
Elevate   Right  arm in sling daily when up & walking.  Elevate the  hand/arm above heart level on pillow/blankets when lying down.  Pad the neck strap with athletic sock/collared shirt.  Apply ice packs to top of  Right  hand for 20 min on and off  Keep bandage clean, dry , & intact until seen in office  May open & close the fingers of the operated arm every hour for exercise.  Call the Dr.  for fever, severe pain, fall or hand injury.  Call for an appointment for office visit  in  10 days.

## 2022-07-26 ENCOUNTER — OUTPATIENT (OUTPATIENT)
Dept: OUTPATIENT SERVICES | Facility: HOSPITAL | Age: 57
LOS: 1 days | End: 2022-07-26
Payer: COMMERCIAL

## 2022-07-26 ENCOUNTER — APPOINTMENT (OUTPATIENT)
Dept: CT IMAGING | Facility: CLINIC | Age: 57
End: 2022-07-26

## 2022-07-26 ENCOUNTER — APPOINTMENT (OUTPATIENT)
Dept: RADIOLOGY | Facility: CLINIC | Age: 57
End: 2022-07-26

## 2022-07-26 DIAGNOSIS — E78.5 HYPERLIPIDEMIA, UNSPECIFIED: ICD-10-CM

## 2022-07-26 DIAGNOSIS — Z98.82 BREAST IMPLANT STATUS: Chronic | ICD-10-CM

## 2022-07-26 DIAGNOSIS — Z98.51 TUBAL LIGATION STATUS: Chronic | ICD-10-CM

## 2022-07-26 DIAGNOSIS — Z96.651 PRESENCE OF RIGHT ARTIFICIAL KNEE JOINT: Chronic | ICD-10-CM

## 2022-07-26 DIAGNOSIS — Z98.890 OTHER SPECIFIED POSTPROCEDURAL STATES: Chronic | ICD-10-CM

## 2022-07-26 DIAGNOSIS — Z00.8 ENCOUNTER FOR OTHER GENERAL EXAMINATION: ICD-10-CM

## 2022-07-26 DIAGNOSIS — R01.1 CARDIAC MURMUR, UNSPECIFIED: ICD-10-CM

## 2022-07-26 PROCEDURE — 71250 CT THORAX DX C-: CPT

## 2022-07-26 PROCEDURE — 71250 CT THORAX DX C-: CPT | Mod: 26

## 2022-07-26 PROCEDURE — 77080 DXA BONE DENSITY AXIAL: CPT | Mod: 26

## 2022-07-26 PROCEDURE — 77080 DXA BONE DENSITY AXIAL: CPT

## 2022-08-01 ENCOUNTER — APPOINTMENT (OUTPATIENT)
Dept: ORTHOPEDIC SURGERY | Facility: CLINIC | Age: 57
End: 2022-08-01

## 2022-08-01 VITALS — BODY MASS INDEX: 28.93 KG/M2 | HEIGHT: 66 IN | WEIGHT: 180 LBS

## 2022-08-01 DIAGNOSIS — G56.01 CARPAL TUNNEL SYNDROME, RIGHT UPPER LIMB: ICD-10-CM

## 2022-08-01 PROCEDURE — 99024 POSTOP FOLLOW-UP VISIT: CPT

## 2022-08-01 RX ORDER — SULFACETAMIDE SODIUM, SULFUR 80; 40 MG/G; MG/G
8-4 LIQUID TOPICAL
Qty: 473 | Refills: 0 | Status: ACTIVE | COMMUNITY
Start: 2022-03-09

## 2022-08-01 RX ORDER — ADAPALENE 1 MG/ML
0.1 SWAB TOPICAL
Qty: 28 | Refills: 0 | Status: ACTIVE | COMMUNITY
Start: 2022-06-27

## 2022-08-01 NOTE — HISTORY OF PRESENT ILLNESS
[de-identified] : s/p right carpal tunnel syndrome [de-identified] : The patient is a 57 year female who returns for the 1st postoperative visit after undergoing right carpal tunnel syndrome at [facility]. The surgery was on 08/01/2022. The patient is recovering at home. She reports mild postoperative pain and soreness to the incision site. Her sensation postoperatively is improved as compared to prior to the surgery. She took 3 out of 5 pills of the Oxycodone, but states it made her nauseous each time. She has since transitioned to Ibuprofen 600mg as needed for pain. She additionally complains of "floaters" that have not resolved since she woke up from the surgery and she is concerned this may be a secondary effect to the anesthesia.  [de-identified] : Patient is WDWN, alert, and in no acute distress. Breathing is unlabored. He is grossly oriented to person, place, and time.\par \par Incision is healing well. There are no signs of infection. Sutures were removed over the right carpal tunnel. Normal amount of postoperative edema and tenderness present.\par Sensation is improved as compared to preoperatively.  [de-identified] : no imaging today [de-identified] : Sutures were removed. She was instructed on local wound care and to keep the incision site dry for 14 days postoperatively. She may then begin to massage the scar with vitamin E. Gentle range of motion, stretching, and strengthening exercises were encouraged. Patient should actively work on gradually increasing use of the hand, as tolerated. Follow up in 6 weeks.

## 2022-08-01 NOTE — END OF VISIT
[FreeTextEntry3] : All medical record entries made by the Scribe were at my,  Dr. Toro French MD., direction and personally dictated by me on 08/01/2022. I have personally reviewed the chart and agree that the record accurately reflects my personal performance of the history, physical exam, assessment and plan.

## 2022-08-01 NOTE — ADDENDUM
[FreeTextEntry1] : I, Janell Prieto wrote this note acting as a scribe for Dr. Toro French on Aug 01, 2022.

## 2022-08-03 ENCOUNTER — NON-APPOINTMENT (OUTPATIENT)
Age: 57
End: 2022-08-03

## 2022-10-19 ENCOUNTER — APPOINTMENT (OUTPATIENT)
Dept: CARDIOLOGY | Facility: CLINIC | Age: 57
End: 2022-10-19

## 2022-10-26 NOTE — PATIENT PROFILE ADULT - NSPREOP1_ARRIVALTIME_GEN_A_NUR
[FreeTextEntry1] : Ms. BETTIE PRATER, 78 year old female, non smoker, w/ hx of HTN, ANCA vasculitis,PE, DVT, diffuse large B-cell lymphoma; Completed 6 cycles of chemo on 09/09/2021. Hospitalized Sept. 2022 for  SOB on exertion and nonproductive cough. CTA of chest showed worsening GGOs and mediastinal LAD, concerning for recurrence of her lymphoma. Stable residual LUIS mass (1.4 x 1 cm) compared to last CT. Subsequently sent for PET/CT and referred to office for surgical evaluation regarding diagnostic biopsy. \par \par PET/CT on 10/21/22:\par 1. Diffuse patchy mildly FDG-avid bilateral groundglass pulmonary opacities are new as compared to prior PET/CT, and appear increased since 9/30/2022, however, comparison is limited due to differences in CT technique. An FDG-avid right upper lobe pulmonary nodule is new as compared to prior PET/CT, and increased in size and density as compared to CT dated 9/30/2022 (SUV 9.5). Findings are concerning for progression of lymphoma. Correlate clinically to exclude infection. An irregular left upper lobe pulmonary nodule containing small focus of increased FDG activity is unchanged as compared to CT dated 9/30/2022, and is decreased in size and metabolism as compared to prior PET/CT.\par 2. Multiple nonspecific FDG-avid mildly enlarged mediastinal and bilateral hilar lymph nodes are increased in size, number, and metabolism. Differential diagnosis includes recurrent lymphoma.\par 3. New nonspecific diffuse splenic and bone marrow hypermetabolism may be secondary to lymphoma.\par 4. Resolution of FDG-avid left maxillary sinus mucosal thickening. New non-FDG-avid minimal right maxillary sinus mucosal thickening.\par \par Recommendations reviewed with patient during this office visit, and all questions answered; Patient instructed on the importance of follow up and verbalizes understanding.\par - Reviewed necessity of tissue diagnosis to dictate further treatment. Discussed Bronch, Right VATS, lung resection with IR marking of right upper lobe nodule Nodule; Mediastinal lymph node dissection Risks, benefits and alternatives explained to patient; All questions answered and patient agrees to proceed with surgery. \par - Cardiac clearance and PFTs required prior to procedure. \par \par Recommendations reviewed with patient during this office visit, and all questions answered; Patient instructed on the importance of follow up and verbalizes understanding.\par \par I personally performed the services described in the documentation, reviewed the documentation recorded by the scribe in my presence and it accurately and completely records my words and actions.\par \par I, Amanda Dennison ANP-C, am scribing for and the presence of FELECIA MadsenIM, the following sections HISTORY OF PRESENT ILLNESS, PAST MEDICAL/FAMILY/SOCIAL HISTORY; REVIEW OF SYSTEMS; VITAL SIGNS; PHYSICAL EXAM; DISPOSITION.\par \par \par \par 
09:30

## 2022-11-21 ENCOUNTER — APPOINTMENT (OUTPATIENT)
Dept: DERMATOLOGY | Facility: CLINIC | Age: 57
End: 2022-11-21

## 2022-11-21 PROCEDURE — 99204 OFFICE O/P NEW MOD 45 MIN: CPT

## 2022-11-21 RX ORDER — TRETINOIN 0.5 MG/G
0.05 CREAM TOPICAL
Qty: 1 | Refills: 2 | Status: ACTIVE | COMMUNITY
Start: 2022-11-21 | End: 1900-01-01

## 2022-12-01 ENCOUNTER — NON-APPOINTMENT (OUTPATIENT)
Age: 57
End: 2022-12-01

## 2022-12-01 LAB — G6PD SER-CCNC: 12.2 U/G HGB

## 2022-12-06 ENCOUNTER — APPOINTMENT (OUTPATIENT)
Dept: GASTROENTEROLOGY | Facility: CLINIC | Age: 57
End: 2022-12-06

## 2022-12-06 VITALS
SYSTOLIC BLOOD PRESSURE: 110 MMHG | WEIGHT: 189 LBS | HEIGHT: 66 IN | BODY MASS INDEX: 30.37 KG/M2 | TEMPERATURE: 97.3 F | DIASTOLIC BLOOD PRESSURE: 78 MMHG

## 2022-12-06 DIAGNOSIS — Z87.891 PERSONAL HISTORY OF NICOTINE DEPENDENCE: ICD-10-CM

## 2022-12-06 DIAGNOSIS — Z12.11 ENCOUNTER FOR SCREENING FOR MALIGNANT NEOPLASM OF COLON: ICD-10-CM

## 2022-12-06 DIAGNOSIS — R14.3 FLATULENCE: ICD-10-CM

## 2022-12-06 DIAGNOSIS — Z83.79 FAMILY HISTORY OF OTHER DISEASES OF THE DIGESTIVE SYSTEM: ICD-10-CM

## 2022-12-06 DIAGNOSIS — Z80.3 FAMILY HISTORY OF MALIGNANT NEOPLASM OF BREAST: ICD-10-CM

## 2022-12-06 DIAGNOSIS — Z86.39 PERSONAL HISTORY OF OTHER ENDOCRINE, NUTRITIONAL AND METABOLIC DISEASE: ICD-10-CM

## 2022-12-06 DIAGNOSIS — Z78.9 OTHER SPECIFIED HEALTH STATUS: ICD-10-CM

## 2022-12-06 DIAGNOSIS — R11.2 NAUSEA WITH VOMITING, UNSPECIFIED: ICD-10-CM

## 2022-12-06 DIAGNOSIS — R14.0 ABDOMINAL DISTENSION (GASEOUS): ICD-10-CM

## 2022-12-06 DIAGNOSIS — R89.4 ABNORMAL IMMUNOLOGICAL FINDINGS IN SPECIMENS FROM OTHER ORGANS, SYSTEMS AND TISSUES: ICD-10-CM

## 2022-12-06 DIAGNOSIS — R12 HEARTBURN: ICD-10-CM

## 2022-12-06 PROCEDURE — 99205 OFFICE O/P NEW HI 60 MIN: CPT | Mod: 25

## 2022-12-06 PROCEDURE — 36415 COLL VENOUS BLD VENIPUNCTURE: CPT

## 2022-12-06 RX ORDER — OXYCODONE AND ACETAMINOPHEN 5; 325 MG/1; MG/1
5-325 TABLET ORAL
Qty: 16 | Refills: 0 | Status: DISCONTINUED | COMMUNITY
Start: 2020-07-06 | End: 2022-12-06

## 2022-12-06 RX ORDER — HYALURONATE SODIUM, STABILIZED 88 MG/4 ML
88 SYRINGE (ML) INTRAARTICULAR
Qty: 2 | Refills: 0 | Status: DISCONTINUED | COMMUNITY
Start: 2021-05-13 | End: 2022-12-06

## 2022-12-06 RX ORDER — CHOLECALCIFEROL (VITAMIN D3) 25 MCG
TABLET ORAL
Refills: 0 | Status: ACTIVE | COMMUNITY

## 2022-12-06 RX ORDER — MULTIVITAMIN
TABLET ORAL
Refills: 0 | Status: ACTIVE | COMMUNITY

## 2022-12-06 RX ORDER — HYALURONATE SODIUM, STABILIZED 88 MG/4 ML
88 SYRINGE (ML) INTRAARTICULAR
Qty: 1 | Refills: 0 | Status: DISCONTINUED | COMMUNITY
Start: 2021-11-08 | End: 2022-12-06

## 2022-12-06 RX ORDER — IBUPROFEN 600 MG/1
600 TABLET, FILM COATED ORAL
Qty: 30 | Refills: 0 | Status: DISCONTINUED | COMMUNITY
Start: 2022-07-22 | End: 2022-12-06

## 2022-12-06 RX ORDER — IPRATROPIUM BROMIDE 42 UG/1
0.06 SPRAY NASAL
Qty: 15 | Refills: 0 | Status: DISCONTINUED | COMMUNITY
Start: 2020-11-07 | End: 2022-12-06

## 2022-12-06 RX ORDER — HYALURONATE SODIUM, STABILIZED 88 MG/4 ML
88 SYRINGE (ML) INTRAARTICULAR
Qty: 2 | Refills: 0 | Status: DISCONTINUED | COMMUNITY
Start: 2020-12-09 | End: 2022-12-06

## 2022-12-06 RX ORDER — CELECOXIB 200 MG/1
200 CAPSULE ORAL
Qty: 30 | Refills: 3 | Status: DISCONTINUED | COMMUNITY
Start: 2020-09-10 | End: 2022-12-06

## 2022-12-06 RX ORDER — SODIUM PICOSULFATE, MAGNESIUM OXIDE, AND ANHYDROUS CITRIC ACID 10; 3.5; 12 MG/160ML; G/160ML; G/160ML
10-3.5-12 MG-GM LIQUID ORAL
Qty: 1 | Refills: 0 | Status: ACTIVE | COMMUNITY
Start: 2022-12-06 | End: 1900-01-01

## 2022-12-06 RX ORDER — PSYLLIUM HUSK 0.4 G
CAPSULE ORAL
Refills: 0 | Status: ACTIVE | COMMUNITY

## 2022-12-06 RX ORDER — RISANKIZUMAB-RZAA 150 MG/ML
150 INJECTION SUBCUTANEOUS
Qty: 1 | Refills: 0 | Status: DISCONTINUED | COMMUNITY
Start: 2022-03-23 | End: 2022-12-06

## 2022-12-06 RX ORDER — OXYCODONE 5 MG/1
5 TABLET ORAL
Qty: 30 | Refills: 0 | Status: DISCONTINUED | COMMUNITY
Start: 2021-08-02 | End: 2022-12-06

## 2022-12-07 DIAGNOSIS — Z01.812 ENCOUNTER FOR PREPROCEDURAL LABORATORY EXAMINATION: ICD-10-CM

## 2022-12-07 DIAGNOSIS — Z20.822 ENCOUNTER FOR PREPROCEDURAL LABORATORY EXAMINATION: ICD-10-CM

## 2022-12-07 LAB
ANNOTATION COMMENT IMP: NORMAL
HLA-DQ2: POSITIVE
HLA-DQ8 QL: NEGATIVE
REF LAB TEST METHOD: NORMAL

## 2022-12-09 PROBLEM — R11.2 NAUSEA AND VOMITING: Status: ACTIVE | Noted: 2022-12-09

## 2022-12-09 NOTE — CONSULT LETTER
[FreeTextEntry1] : Dear Dr. Andres Garcia and Dr. Agnes Villafana,\par \par I had the pleasure of seeing your patient TJ SAMUELS in the office today.  My office note is attached. PLEASE READ THE "ASSESSMENT" SECTION OF THE NOTE TO SEE MY IMPRESSION AND PLAN.\par \par Thank you very much for allowing me to participate in the care of your patient.\par \par Sincerely,\par \par Guerrero Taylor M.D., FACG, FACP\par Director, Celiac Program at [facility]\par  of Medicine, [employer] at [facility] Adjunct  of Medicine, [facility] Practice Director, [facility] [place]\[facility] [address]\[place] [address]\par Tel: [phone]\par Email: [email or web address]\par \par \par The attached note has been created using a voice recognition system (Dragon).  There may be some misspellings and typos.  Please call my office if you have any issues or questions.

## 2022-12-09 NOTE — ASSESSMENT
[FreeTextEntry1] : Patient with a diagnosis of dermatitis herpetiformis.  She has markedly abnormal celiac serologies from April which is very consistent with a diagnosis of celiac disease although biopsies are necessary to fully confirm the diagnosis.  The patient has longstanding reflux symptoms and continues to get heartburn at night despite taking pantoprazole 40 mg a day.  She has frequent nausea and vomiting as well as excessive gas and bloating.  She also requires a screening colonoscopy.\par \par I had a long discussion with the patient regarding celiac disease, the gluten-free diet, the concepts of contact and cross contamination, and the potential complications of celiac disease. We also discussed the need for ongoing followup.  We discussed the importance of making a correct diagnosis.\par \par Bloodwork was sent for CBC, chem-pack, TSH, celiac markers, iron studies, vitamin B12, folate, vitamin A, vitamin D, vitamin K, magnesium, carotene, vitamin B6, zinc, copper, vitamin B1, vitamin B2, selenium, and celiac HLA testing.\par \par An EGD and colonoscopy have been scheduled. The risks, benefits, alternatives, and limitations of the procedures, including the possibility of missed lesions, were explained.\par \par The patient will remain on a gluten-containing diet until we perform the endoscopic evaluation.  Afterwards, I anticipate the need for a strict gluten-free diet and will instruct the patient accordingly.

## 2022-12-09 NOTE — HISTORY OF PRESENT ILLNESS
[FreeTextEntry1] : The patient is a 57-year-old woman who developed new blisters on her elbows 2 years ago.  She has eczema and is on Dupixent.  She has had a couple of skin biopsies and was felt at first to have psoriasis.  She has now been diagnosed with dermatitis herpetiformis.  She had celiac blood work done on April 1, 2022 with tissue transglutaminase IgA greater than 100, anti-deamidated gliadin IgG of 77.6 and IgA of 131.8 with antiendomysial antibody positive at 1: 80.  She has been told to go on a gluten-free diet but she has not done this.  She will be starting dapsone tomorrow.\par \par The patient reports longstanding symptoms of reflux for which she has been on pantoprazole for 3 to 4 years.  She still gets heartburn at nights and takes Tums at night a couple of times a week.  She states that 2-3 times a week, she will get nausea with vomiting of water.  She notes the symptoms after drinking coffee or taking medications.  She denies dysphagia.  She does report excessive gas and bloating.  She denies abdominal pain.  She has 1 soft bowel movement a day without melena or bright red blood per rectum.  She has gained 30 pounds over the past 1-1/2 years.  The patient has never had an EGD and reports her last colonoscopy was 8 years ago.  The patient had a normal bone density scan on July 26, 2022.  The patient has not been admitted to the hospital in the past year and denies any cardiac issues.  The patient is planning to have knee replacement in February.

## 2022-12-09 NOTE — REASON FOR VISIT
[Initial Evaluation] : an initial evaluation [FreeTextEntry1] : Dermatitis herpetiformis, abnormal celiac serologies, bloating, excessive gas, heartburn, nausea, vomiting, screening colonoscopy

## 2022-12-13 ENCOUNTER — APPOINTMENT (OUTPATIENT)
Dept: ORTHOPEDIC SURGERY | Facility: HOSPITAL | Age: 57
End: 2022-12-13

## 2022-12-15 ENCOUNTER — NON-APPOINTMENT (OUTPATIENT)
Age: 57
End: 2022-12-15

## 2022-12-15 LAB
25(OH)D3 SERPL-MCNC: 37.5 NG/ML
ALBUMIN SERPL ELPH-MCNC: 4.5 G/DL
ALP BLD-CCNC: 73 U/L
ALT SERPL-CCNC: 26 U/L
ANION GAP SERPL CALC-SCNC: 11 MMOL/L
AST SERPL-CCNC: 25 U/L
BASOPHILS # BLD AUTO: 0.07 K/UL
BASOPHILS NFR BLD AUTO: 0.7 %
BETA CAROTENE: 25 UG/DL
BILIRUB SERPL-MCNC: 0.2 MG/DL
BUN SERPL-MCNC: 24 MG/DL
CALCIUM SERPL-MCNC: 9.7 MG/DL
CHLORIDE SERPL-SCNC: 107 MMOL/L
CO2 SERPL-SCNC: 24 MMOL/L
COPPER SERPL-MCNC: 132 UG/DL
CREAT SERPL-MCNC: 0.81 MG/DL
EGFR: 84 ML/MIN/1.73M2
ENDOMYSIUM IGA SER QL: POSITIVE
ENDOMYSIUM IGA TITR SER: ABNORMAL
EOSINOPHIL # BLD AUTO: 0.26 K/UL
EOSINOPHIL NFR BLD AUTO: 2.8 %
FERRITIN SERPL-MCNC: 33 NG/ML
FOLATE SERPL-MCNC: 13.1 NG/ML
GGT SERPL-CCNC: 15 U/L
GLIADIN IGA SER QL: 106.8 UNITS
GLIADIN IGG SER QL: 108.6 UNITS
GLIADIN PEPTIDE IGA SER-ACNC: POSITIVE
GLIADIN PEPTIDE IGG SER-ACNC: POSITIVE
GLUCOSE SERPL-MCNC: 85 MG/DL
HCT VFR BLD CALC: 44.8 %
HGB BLD-MCNC: 14.2 G/DL
IGA SER QL IEP: 235 MG/DL
IMM GRANULOCYTES NFR BLD AUTO: 0.2 %
IRON SATN MFR SERPL: 32 %
IRON SERPL-MCNC: 106 UG/DL
LYMPHOCYTES # BLD AUTO: 2.34 K/UL
LYMPHOCYTES NFR BLD AUTO: 24.9 %
MAGNESIUM SERPL-MCNC: 2 MG/DL
MAN DIFF?: NORMAL
MCHC RBC-ENTMCNC: 31.7 GM/DL
MCHC RBC-ENTMCNC: 32.1 PG
MCV RBC AUTO: 101.1 FL
MENADIONE SERPL-MCNC: 0.88 NG/ML
MONOCYTES # BLD AUTO: 0.83 K/UL
MONOCYTES NFR BLD AUTO: 8.8 %
NEUTROPHILS # BLD AUTO: 5.87 K/UL
NEUTROPHILS NFR BLD AUTO: 62.6 %
PLATELET # BLD AUTO: 411 K/UL
POTASSIUM SERPL-SCNC: 4.3 MMOL/L
PROMETHEUS CELIAC GENETICS: NORMAL
PROT SERPL-MCNC: 7.4 G/DL
RBC # BLD: 4.43 M/UL
RBC # FLD: 12.3 %
SELENIUM SERPL-MCNC: 160 UG/L
SODIUM SERPL-SCNC: 142 MMOL/L
TIBC SERPL-MCNC: 327 UG/DL
TSH SERPL-ACNC: 2.35 UIU/ML
TTG IGA SER IA-ACNC: 69 U/ML
TTG IGA SER-ACNC: POSITIVE
TTG IGG SER IA-ACNC: 3.8 U/ML
TTG IGG SER IA-ACNC: NEGATIVE
UIBC SERPL-MCNC: 221 UG/DL
VIT A SERPL-MCNC: 48.9 UG/DL
VIT B1 SERPL-MCNC: 163.3 NMOL/L
VIT B12 SERPL-MCNC: 921 PG/ML
VIT B2 SERPL-MCNC: 262 UG/L
VIT B6 SERPL-MCNC: 11.5 UG/L
WBC # FLD AUTO: 9.39 K/UL
ZINC SERPL-MCNC: 66 UG/DL

## 2023-01-09 ENCOUNTER — RX RENEWAL (OUTPATIENT)
Age: 58
End: 2023-01-09

## 2023-01-16 ENCOUNTER — RX RENEWAL (OUTPATIENT)
Age: 58
End: 2023-01-16

## 2023-01-23 ENCOUNTER — APPOINTMENT (OUTPATIENT)
Dept: CARDIOLOGY | Facility: CLINIC | Age: 58
End: 2023-01-23
Payer: COMMERCIAL

## 2023-01-23 VITALS
WEIGHT: 189 LBS | SYSTOLIC BLOOD PRESSURE: 110 MMHG | HEART RATE: 92 BPM | HEIGHT: 66 IN | BODY MASS INDEX: 30.37 KG/M2 | OXYGEN SATURATION: 93 % | DIASTOLIC BLOOD PRESSURE: 80 MMHG

## 2023-01-23 DIAGNOSIS — M17.0 BILATERAL PRIMARY OSTEOARTHRITIS OF KNEE: ICD-10-CM

## 2023-01-23 DIAGNOSIS — Z01.818 ENCOUNTER FOR OTHER PREPROCEDURAL EXAMINATION: ICD-10-CM

## 2023-01-23 PROCEDURE — 99214 OFFICE O/P EST MOD 30 MIN: CPT | Mod: 25

## 2023-01-23 PROCEDURE — 93000 ELECTROCARDIOGRAM COMPLETE: CPT

## 2023-01-23 NOTE — HISTORY OF PRESENT ILLNESS
[FreeTextEntry1] : I was asked to see this delightful patient today for Pre-op Evaluation  prior to left knee replacement  with   Dr. Sidney Santiago  at fax number  [phone]  .  The patient denies fever, cough, wheezing, sputum production, hemoptysis, dyspnea, congestion, diarrhea, constipation, nausea, vomiting, bright red blood per rectum, melena, angina, chest pain, palpitations, diaphoresis, PND, incontinence, frequency, urgency, dysuria, edema, joint pain, headache, weakness, numbness and dizziness. The date of the planned procedure is: 2/1/2023\par  \par The patient presents for evaluation of high blood pressure. Patient is currently tolerating the current  antihypertensive regime and they deny headaches, stiff neck, visual changes, pedal Edema or PND. They also are here for follow-up of elevated cholesterol. Patient is currently tolerating medication and and does not complain of new  muscle pain, joint pain, back pain,urinary changes ,nausea, vomiting, abdominal pain or diarrhea. The patient is trying to follow a low cholesterol diet. \par \par Patient also presents today for follow-up of obesity. The patient states they have not lost significant weight since the last visit. Patient is currently obese and remains sedentary. The patient has not been compliant with medical follow-up instructions for weight-loss and exercise since the last visit.\par

## 2023-01-25 LAB — SARS-COV-2 N GENE NPH QL NAA+PROBE: NOT DETECTED

## 2023-01-27 ENCOUNTER — APPOINTMENT (OUTPATIENT)
Dept: GASTROENTEROLOGY | Facility: AMBULATORY MEDICAL SERVICES | Age: 58
End: 2023-01-27

## 2023-01-30 ENCOUNTER — APPOINTMENT (OUTPATIENT)
Dept: CARDIOLOGY | Facility: CLINIC | Age: 58
End: 2023-01-30
Payer: COMMERCIAL

## 2023-01-30 PROCEDURE — A9500: CPT

## 2023-01-30 PROCEDURE — 78452 HT MUSCLE IMAGE SPECT MULT: CPT

## 2023-01-30 PROCEDURE — 93015 CV STRESS TEST SUPVJ I&R: CPT

## 2023-01-30 RX ORDER — REGADENOSON 0.08 MG/ML
0.4 INJECTION, SOLUTION INTRAVENOUS
Qty: 1 | Refills: 0 | Status: COMPLETED | OUTPATIENT
Start: 2023-01-30

## 2023-01-30 RX ADMIN — REGADENOSON 5 MG/5ML: 0.08 INJECTION, SOLUTION INTRAVENOUS at 00:00

## 2023-02-13 ENCOUNTER — NON-APPOINTMENT (OUTPATIENT)
Age: 58
End: 2023-02-13

## 2023-02-13 RX ORDER — MUPIROCIN 20 MG/G
2 OINTMENT TOPICAL
Qty: 1 | Refills: 0 | Status: ACTIVE | COMMUNITY
Start: 2023-02-13 | End: 1900-01-01

## 2023-03-18 NOTE — DIETITIAN INITIAL EVALUATION ADULT. - FLUID ACCUMULATION
none noted Modified Advancement Flap Text: The defect edges were debeveled with a #15 scalpel blade.  Given the location of the defect, shape of the defect and the proximity to free margins a modified advancement flap was deemed most appropriate.  Using a sterile surgical marker, an appropriate advancement flap was drawn incorporating the defect and placing the expected incisions within the relaxed skin tension lines where possible.    The area thus outlined was incised deep to adipose tissue with a #15 scalpel blade.  The skin margins were undermined to an appropriate distance in all directions utilizing iris scissors.

## 2023-03-21 ENCOUNTER — APPOINTMENT (OUTPATIENT)
Dept: DERMATOLOGY | Facility: CLINIC | Age: 58
End: 2023-03-21
Payer: COMMERCIAL

## 2023-03-21 PROCEDURE — 99214 OFFICE O/P EST MOD 30 MIN: CPT

## 2023-03-22 ENCOUNTER — NON-APPOINTMENT (OUTPATIENT)
Age: 58
End: 2023-03-22

## 2023-03-22 LAB
ALBUMIN SERPL ELPH-MCNC: 5.1 G/DL
ALP BLD-CCNC: 89 U/L
ALT SERPL-CCNC: 20 U/L
AST SERPL-CCNC: 20 U/L
BASOPHILS # BLD AUTO: 0.05 K/UL
BASOPHILS NFR BLD AUTO: 0.5 %
BILIRUB DIRECT SERPL-MCNC: 0.1 MG/DL
BILIRUB INDIRECT SERPL-MCNC: 0.4 MG/DL
BILIRUB SERPL-MCNC: 0.5 MG/DL
EOSINOPHIL # BLD AUTO: 0.13 K/UL
EOSINOPHIL NFR BLD AUTO: 1.2 %
HCT VFR BLD CALC: 45.5 %
HGB BLD-MCNC: 14.3 G/DL
IMM GRANULOCYTES NFR BLD AUTO: 0.3 %
LYMPHOCYTES # BLD AUTO: 3.12 K/UL
LYMPHOCYTES NFR BLD AUTO: 29.8 %
MAN DIFF?: NORMAL
MCHC RBC-ENTMCNC: 31.4 GM/DL
MCHC RBC-ENTMCNC: 32.9 PG
MCV RBC AUTO: 104.6 FL
MONOCYTES # BLD AUTO: 0.99 K/UL
MONOCYTES NFR BLD AUTO: 9.4 %
NEUTROPHILS # BLD AUTO: 6.16 K/UL
NEUTROPHILS NFR BLD AUTO: 58.8 %
PLATELET # BLD AUTO: 433 K/UL
PROT SERPL-MCNC: 7.7 G/DL
RBC # BLD: 4.35 M/UL
RBC # BLD: 4.35 M/UL
RBC # FLD: 12.4 %
RETICS # AUTO: 1.9 %
RETICS AGGREG/RBC NFR: 83.1 K/UL
WBC # FLD AUTO: 10.48 K/UL

## 2023-04-24 ENCOUNTER — APPOINTMENT (OUTPATIENT)
Dept: CARDIOLOGY | Facility: CLINIC | Age: 58
End: 2023-04-24

## 2023-05-26 NOTE — ASU PREOP CHECKLIST - NS PREOP CHK CHLOROHEX WASH
"  Assessment & Plan     1. Type 2 diabetes mellitus with stage 3b chronic kidney disease, with long-term current use of insulin (H)  Morning BG are a little better this week, so I am hesitant to increase insulin dose.  I did recommend he consider a CGM, he will read about those this weekend.  Follow-up in three months, sooner as needed.  - Hemoglobin A1c; Future    2. Mixed hyperlipidemia  No changes at this time.  - ALT; Future  - Lipid Profile; Future    3. Benign essential hypertension  Refilled  - potassium chloride ER (K-TAB/KLOR-CON) 10 MEQ CR tablet; Take 1 tablet (10 mEq) by mouth daily  Dispense: 90 tablet; Refill: 3  - Basic metabolic panel; Future    4. Stage 3b chronic kidney disease (H)  No changes    5. Pulmonary embolism and infarction (H)    6. Congestive heart failure, unspecified HF chronicity, unspecified heart failure type (H)    7. Chronic atrial fibrillation (H)    8. Morbid obesity (H)        BMI:   Estimated body mass index is 33.05 kg/m  as calculated from the following:    Height as of this encounter: 1.664 m (5' 5.5\").    Weight as of this encounter: 91.5 kg (201 lb 11.2 oz).     Return in about 3 months (around 8/26/2023) for Diabetic Follow-up, Chronic Disease Management, Medication review.    Lorelei Felix MD  [facility] [place] ALINA Son is a 78 year old, presenting for the following health issues:  Diabetes, Lipids, and Hypertension      HPI     Diabetes Follow-up    How often are you checking your blood sugar? Three times daily  Blood sugar testing frequency justification:  Uncontrolled diabetes  What time of day are you checking your blood sugars (select all that apply)?  Before and after meals  Have you had any blood sugars above 200?  Yes a few  Have you had any blood sugars below 70?  No    What symptoms do you notice when your blood sugar is low?  Shaky    What concerns do you have today about your diabetes? None and Other: A1C was higher     Do " "you have any of these symptoms? (Select all that apply)  No numbness or tingling in feet.  No redness, sores or blisters on feet.  No complaints of excessive thirst.  No reports of blurry vision.  No significant changes to weight.    Have you had a diabetic eye exam in the last 12 months? No        BP Readings from Last 2 Encounters:   05/26/23 136/70   02/24/23 122/64     Hemoglobin A1C (%)   Date Value   05/24/2023 8.0 (H)   02/21/2023 7.5 (H)   04/27/2021 9.7 (H)   01/26/2021 9.4 (H)     LDL Cholesterol Calculated (mg/dL)   Date Value   05/24/2023 23   02/21/2023 17   04/27/2021 12   01/26/2021 26       Hyperlipidemia Follow-Up      Are you regularly taking any medication or supplement to lower your cholesterol?   Yes- Crestor    Are you having muscle aches or other side effects that you think could be caused by your cholesterol lowering medication?  No    Hypertension Follow-up      Do you check your blood pressure regularly outside of the clinic? Yes     Are you following a low salt diet? Yes    Are your blood pressures ever more than 140 on the top number (systolic) OR more   than 90 on the bottom number (diastolic), for example 140/90? No        Review of Systems   Constitutional, HEENT, cardiovascular, pulmonary, gi and gu systems are negative, except as otherwise noted.      Objective    /70 (BP Location: Right arm, Patient Position: Sitting, Cuff Size: Adult Regular)   Pulse 72   Temp 97.9  F (36.6  C) (Tympanic)   Resp 18   Ht 1.664 m (5' 5.5\")   Wt 91.5 kg (201 lb 11.2 oz)   SpO2 99%   BMI 33.05 kg/m    Body mass index is 33.05 kg/m .  Physical Exam   GENERAL: healthy, alert and no distress  PSYCH: mentation appears normal, affect normal/bright    Lab on 05/24/2023   Component Date Value Ref Range Status     Prostate Specific Antigen Screen 05/24/2023 8.67 (H)  0.00 - 6.50 ng/mL Final     TSH 05/24/2023 2.77  0.30 - 4.20 uIU/mL Final     Estimated Average Glucose 05/24/2023 183  mg/dL Final "     Hemoglobin A1C 05/24/2023 8.0 (H)  <5.7 % Final    Normal <5.7%   Prediabetes 5.7-6.4%    Diabetes 6.5% or higher     Note: Adopted from ADA consensus guidelines.     Sodium 05/24/2023 138  136 - 145 mmol/L Final     Potassium 05/24/2023 3.8  3.4 - 5.3 mmol/L Final     Chloride 05/24/2023 98  98 - 107 mmol/L Final     Carbon Dioxide (CO2) 05/24/2023 28  22 - 29 mmol/L Final     Anion Gap 05/24/2023 12  7 - 15 mmol/L Final     Urea Nitrogen 05/24/2023 16.2  8.0 - 23.0 mg/dL Final     Creatinine 05/24/2023 1.43 (H)  0.67 - 1.17 mg/dL Final     Calcium 05/24/2023 9.7  8.8 - 10.2 mg/dL Final     Glucose 05/24/2023 109 (H)  70 - 99 mg/dL Final     GFR Estimate 05/24/2023 50 (L)  >60 mL/min/1.73m2 Final    eGFR calculated using 2021 CKD-EPI equation.     Cholesterol 05/24/2023 94  <200 mg/dL Final     Triglycerides 05/24/2023 162 (H)  <150 mg/dL Final     Direct Measure HDL 05/24/2023 39 (L)  >=40 mg/dL Final     LDL Cholesterol Calculated 05/24/2023 23  <=100 mg/dL Final     Non HDL Cholesterol 05/24/2023 55  <130 mg/dL Final     ALT 05/24/2023 35  10 - 50 U/L Final     Creatinine Urine mg/dL 05/24/2023 34.8  mg/dL Final    The reference ranges have not been established in urine creatinine. The results should be integrated into the clinical context for interpretation.     Albumin Urine mg/L 05/24/2023 <12.0  mg/L Final    The reference ranges have not been established in urine albumin. The results should be integrated into the clinical context for interpretation.     Albumin Urine mg/g Cr 05/24/2023    Final    Unable to calculate, urine albumin and/or urine creatinine is outside detectable limits.  Microalbuminuria is defined as an albumin:creatinine ratio of 17 to 299 for males and 25 to 299 for females. A ratio of albumin:creatinine of 300 or higher is indicative of overt proteinuria.  Due to biologic variability, positive results should be confirmed by a second, first-morning random or 24-hour timed urine  specimen. If there is discrepancy, a third specimen is recommended. When 2 out of 3 results are in the microalbuminuria range, this is evidence for incipient nephropathy and warrants increased efforts at glucose control, blood pressure control, and institution of therapy with an angiotensin-converting-enzyme (ACE) inhibitor (if the patient can tolerate it).                    at home:

## 2023-06-25 ENCOUNTER — RX RENEWAL (OUTPATIENT)
Age: 58
End: 2023-06-25

## 2023-06-27 ENCOUNTER — APPOINTMENT (OUTPATIENT)
Dept: DERMATOLOGY | Facility: CLINIC | Age: 58
End: 2023-06-27
Payer: COMMERCIAL

## 2023-06-27 DIAGNOSIS — L30.9 DERMATITIS, UNSPECIFIED: ICD-10-CM

## 2023-06-27 PROCEDURE — 99214 OFFICE O/P EST MOD 30 MIN: CPT

## 2023-06-28 ENCOUNTER — NON-APPOINTMENT (OUTPATIENT)
Age: 58
End: 2023-06-28

## 2023-06-28 LAB
ALBUMIN SERPL ELPH-MCNC: 4.8 G/DL
ALP BLD-CCNC: 84 U/L
ALT SERPL-CCNC: 23 U/L
AST SERPL-CCNC: 22 U/L
BILIRUB DIRECT SERPL-MCNC: 0.2 MG/DL
BILIRUB INDIRECT SERPL-MCNC: 0.4 MG/DL
BILIRUB SERPL-MCNC: 0.5 MG/DL
PROT SERPL-MCNC: 7.2 G/DL
RBC # BLD: 3.92 M/UL
RETICS # AUTO: 3.1 %
RETICS AGGREG/RBC NFR: 120 K/UL

## 2023-07-03 ENCOUNTER — RX RENEWAL (OUTPATIENT)
Age: 58
End: 2023-07-03

## 2023-08-10 NOTE — PHYSICAL EXAM
de-identified] : Constitutional\par o Appearance : well-nourished, well developed, alert, in no acute distress \par Head and Face\par o Head :\par ¦ Inspection : atraumatic, normocephalic\par o Face :\par ¦ Inspection : no visible rash or discoloration\par Respiratory\par o Respiratory Effort: breathing unlabored \par Neurologic\par o Mental Status Examination :\par ¦ Orientation : grossly oriented to person, place and time\par Psychiatric\par o Mood and Affect: mood normal, affect appropriate \par \par Right Lower Extremity\par o Buttock : no tenderness, swelling or deformities \par o Right Hip :\par ¦ Inspection/Palpation : no tenderness, swelling or deformities\par ¦ Range of Motion : full and painless in all planes, no crepitance\par ¦ Stability : joint stability intact\par ¦ Strength : extension, flexion, adduction, abduction, internal rotation and external rotation, 4/5 \par \par o Right Knee :\par ¦ Inspection/Palpation : no tenderness to palpation, no swelling, incision well-healed\par ¦ Range of Motion : 3-115°, no crepitance, good patellofemoral glide  \par ¦ Stability : no valgus or varus instability present on provocative testing\par ¦ Strength : flexion and extension 4/5, can SLR, no extensor lag\par ¦ Tests and Signs : negative Anterior Drawer\par \par Left Lower Extremity\par o Buttock : no tenderness, swelling or deformities \par o Left Hip :\par ¦ Inspection/Palpation : no tenderness, no swelling or deformities\par ¦ Range of Motion : full and painless in all planes, no crepitance\par ¦ Stability : joint stability intact\par ¦ Strength : extension, flexion, adduction, abduction, internal rotation and external rotation, 4+/5\par \par o Left Knee :\par ¦ Inspection/Palpation : medial compartment tenderness, medial and lateral facet tenderness of the patella, no swelling, mild varus deformity\par ¦ Range of Motion : 0-125°, pain on full flexion, mild patellofemoral crepitance, good patellofemoral glide\par ¦ Stability : no valgus or varus instability present on provocative testing\par ¦ Strength : flexion and extension 4+/5\par ¦ Tests and Signs : Anterior Drawer negative, Lachman negative, Evans's negative\par \par o Peripheral Vascular System :\par ¦ Dorsalis Pedis Artery : pulse 2+ bilaterally\par ¦ Posterior Tibial Artery : pulse 2+ bilaterally \par \par Gait : heel/toe on the right, walking without a cane, mild varus deformity on the left, no significant extremity swelling or lymphedema, good proprioception and balance\par \par Radiology Results:\par o Right Knee : Standing AP, Lateral and skyline views of the knee were obtained and revealed excellent position of her right total knee replacement with central tracking of the patella.

## 2023-08-21 ENCOUNTER — APPOINTMENT (OUTPATIENT)
Dept: GASTROENTEROLOGY | Facility: AMBULATORY MEDICAL SERVICES | Age: 58
End: 2023-08-21
Payer: COMMERCIAL

## 2023-08-21 PROCEDURE — 43239 EGD BIOPSY SINGLE/MULTIPLE: CPT | Mod: 59

## 2023-08-21 PROCEDURE — 45380 COLONOSCOPY AND BIOPSY: CPT | Mod: PT

## 2023-09-19 ENCOUNTER — LABORATORY RESULT (OUTPATIENT)
Age: 58
End: 2023-09-19

## 2023-09-19 ENCOUNTER — APPOINTMENT (OUTPATIENT)
Dept: GASTROENTEROLOGY | Facility: CLINIC | Age: 58
End: 2023-09-19
Payer: COMMERCIAL

## 2023-09-19 ENCOUNTER — NON-APPOINTMENT (OUTPATIENT)
Age: 58
End: 2023-09-19

## 2023-09-19 VITALS
DIASTOLIC BLOOD PRESSURE: 78 MMHG | BODY MASS INDEX: 31.18 KG/M2 | HEART RATE: 78 BPM | TEMPERATURE: 96.2 F | WEIGHT: 194 LBS | OXYGEN SATURATION: 97 % | HEIGHT: 66 IN | SYSTOLIC BLOOD PRESSURE: 130 MMHG

## 2023-09-19 DIAGNOSIS — R10.11 RIGHT UPPER QUADRANT PAIN: ICD-10-CM

## 2023-09-19 DIAGNOSIS — K90.0 CELIAC DISEASE: ICD-10-CM

## 2023-09-19 DIAGNOSIS — K25.9 GASTRIC ULCER, UNSPECIFIED AS ACUTE OR CHRONIC, W/OUT HEMORRHAGE OR PERFORATION: ICD-10-CM

## 2023-09-19 DIAGNOSIS — K64.8 OTHER HEMORRHOIDS: ICD-10-CM

## 2023-09-19 DIAGNOSIS — K44.9 DIAPHRAGMATIC HERNIA W/OUT OBSTRUCTION OR GANGRENE: ICD-10-CM

## 2023-09-19 DIAGNOSIS — D12.6 BENIGN NEOPLASM OF COLON, UNSPECIFIED: ICD-10-CM

## 2023-09-19 DIAGNOSIS — K22.70 BARRETT'S ESOPHAGUS W/OUT DYSPLASIA: ICD-10-CM

## 2023-09-19 DIAGNOSIS — K64.4 RESIDUAL HEMORRHOIDAL SKIN TAGS: ICD-10-CM

## 2023-09-19 PROCEDURE — 36415 COLL VENOUS BLD VENIPUNCTURE: CPT

## 2023-09-19 PROCEDURE — 99215 OFFICE O/P EST HI 40 MIN: CPT | Mod: 25

## 2023-09-19 RX ORDER — HYDROCORTISONE 2.5% 25 MG/G
2.5 CREAM TOPICAL
Qty: 1 | Refills: 2 | Status: ACTIVE | COMMUNITY
Start: 2023-09-19 | End: 1900-01-01

## 2023-10-02 LAB
25(OH)D3 SERPL-MCNC: 38.9 NG/ML
ALBUMIN SERPL ELPH-MCNC: 4.9 G/DL
ALP BLD-CCNC: 91 U/L
ALT SERPL-CCNC: 24 U/L
ANION GAP SERPL CALC-SCNC: 12 MMOL/L
AST SERPL-CCNC: 23 U/L
BASOPHILS # BLD AUTO: 0.08 K/UL
BASOPHILS NFR BLD AUTO: 0.9 %
BETA CAROTENE: 31 UG/DL
BILIRUB SERPL-MCNC: 0.5 MG/DL
BUN SERPL-MCNC: 21 MG/DL
CALCIUM SERPL-MCNC: 9.5 MG/DL
CHLORIDE SERPL-SCNC: 105 MMOL/L
CO2 SERPL-SCNC: 24 MMOL/L
COPPER SERPL-MCNC: 115 UG/DL
CREAT SERPL-MCNC: 0.74 MG/DL
EGFR: 93 ML/MIN/1.73M2
ENDOMYSIUM IGA SER QL: POSITIVE
ENDOMYSIUM IGA TITR SER: ABNORMAL
EOSINOPHIL # BLD AUTO: 0.29 K/UL
EOSINOPHIL NFR BLD AUTO: 3.4 %
FERRITIN SERPL-MCNC: 48 NG/ML
FOLATE SERPL-MCNC: 10.9 NG/ML
GGT SERPL-CCNC: 15 U/L
GLIADIN IGA SER QL: 45.7 UNITS
GLIADIN IGG SER QL: 96.1 UNITS
GLIADIN PEPTIDE IGA SER-ACNC: POSITIVE
GLIADIN PEPTIDE IGG SER-ACNC: POSITIVE
GLUCOSE SERPL-MCNC: 93 MG/DL
HCT VFR BLD CALC: 41.5 %
HGB BLD-MCNC: 12.8 G/DL
IGA SER QL IEP: 261 MG/DL
IMM GRANULOCYTES NFR BLD AUTO: 0.4 %
IRON SATN MFR SERPL: 28 %
IRON SERPL-MCNC: 84 UG/DL
LYMPHOCYTES # BLD AUTO: 2.56 K/UL
LYMPHOCYTES NFR BLD AUTO: 30.2 %
MAGNESIUM SERPL-MCNC: 2.2 MG/DL
MAN DIFF?: NORMAL
MCHC RBC-ENTMCNC: 30.8 GM/DL
MCHC RBC-ENTMCNC: 32.7 PG
MCV RBC AUTO: 105.9 FL
MENADIONE SERPL-MCNC: 0.91 NG/ML
MONOCYTES # BLD AUTO: 0.66 K/UL
MONOCYTES NFR BLD AUTO: 7.8 %
NEUTROPHILS # BLD AUTO: 4.85 K/UL
NEUTROPHILS NFR BLD AUTO: 57.3 %
NICOTINAMIDE: 61.4 NG/ML
NICOTINIC ACID: <5 NG/ML
PANTOTHENATE SERPLBLD-MCNC: 43.7 NG/ML
PLATELET # BLD AUTO: 380 K/UL
POTASSIUM SERPL-SCNC: 4.4 MMOL/L
PROT SERPL-MCNC: 7.4 G/DL
RBC # BLD: 3.92 M/UL
RBC # FLD: 12.7 %
SELENIUM SERPL-MCNC: 178 UG/L
SODIUM SERPL-SCNC: 142 MMOL/L
TIBC SERPL-MCNC: 295 UG/DL
TSH SERPL-ACNC: 2.24 UIU/ML
TTG IGA SER IA-ACNC: 12.5 U/ML
TTG IGA SER-ACNC: POSITIVE
TTG IGG SER IA-ACNC: 1.3 U/ML
TTG IGG SER IA-ACNC: NEGATIVE
UIBC SERPL-MCNC: 211 UG/DL
VIT A SERPL-MCNC: 47.7 UG/DL
VIT B1 SERPL-MCNC: 131.5 NMOL/L
VIT B12 SERPL-MCNC: 797 PG/ML
VIT B2 SERPL-MCNC: 316 UG/L
VIT B6 SERPL-MCNC: 8.4 UG/L
VIT C SERPL-MCNC: 0.4 MG/DL
WBC # FLD AUTO: 8.47 K/UL
ZINC SERPL-MCNC: 72 UG/DL

## 2023-10-05 NOTE — H&P PST ADULT - NSANTHOBSERVEDRD_ENT_A_CORE
Anesthesia Pain Management Service    SUBJECTIVE: Patient IV PCA never started as patient is intubated, on Precedex.    Pain Scale Score	At rest: ___ 	With Activity: ___ 	[X ] Refer to charted pain scores    THERAPY:    [ ] IV PCA Morphine		[ ] 5 mg/mL	[ ] 1 mg/mL  [X ] IV PCA Hydromorphone	[ ] 5 mg/mL	[X ] 1 mg/mL  [ ] IV PCA Fentanyl		[ ] 50 micrograms/mL    Demand dose __0.2_ lockout __6_ (minutes) Continuous Rate _0__ Total: _0__   mg used (in past 24 hrs)      MEDICATIONS  (STANDING):  chlorhexidine 2% Cloths 1 Application(s) Topical daily  ciprofloxacin   IVPB 400 milliGRAM(s) IV Intermittent every 12 hours  dexMEDEtomidine Infusion 0.1 MICROgram(s)/kG/Hr (1.72 mL/Hr) IV Continuous <Continuous>  fentaNYL   Infusion 0.5 MICROgram(s)/kG/Hr (3.45 mL/Hr) IV Continuous <Continuous>  heparin   Injectable 5000 Unit(s) SubCutaneous every 8 hours  insulin lispro (ADMELOG) corrective regimen sliding scale   SubCutaneous every 6 hours  lactated ringers. 1000 milliLiter(s) (100 mL/Hr) IV Continuous <Continuous>  metroNIDAZOLE  IVPB 500 milliGRAM(s) IV Intermittent every 8 hours  mitoMYcin INTRAPERITONEAL  (eMAR) 30 milliGRAM(s) IntraPeritoneal once  mitoMYcin INTRAPERITONEAL  (eMAR) 10 milliGRAM(s) IntraPeritoneal once  pantoprazole  Injectable 40 milliGRAM(s) IV Push every 24 hours  phenylephrine    Infusion 0.701 MICROgram(s)/kG/Min (18.1 mL/Hr) IV Continuous <Continuous>  propofol Infusion 50 MICROgram(s)/kG/Min (20.7 mL/Hr) IV Continuous <Continuous>    MEDICATIONS  (PRN):  HYDROmorphone  Injectable 0.5 milliGRAM(s) IV Push every 10 minutes PRN Moderate Pain (4 - 6)  HYDROmorphone  Injectable 1 milliGRAM(s) IV Push every 10 minutes PRN Severe Pain (7 - 10)  naloxone Injectable 0.1 milliGRAM(s) IV Push every 3 minutes PRN For ANY of the following changes in patient status:  A. RR LESS THAN 10 breaths per minute, B. Oxygen saturation LESS THAN 90%, C. Sedation score of 6      OBJECTIVE: Patient on stretcher in PACU, intubated and sedated.    Sedation Score:	[ X] Alert	[ ] Drowsy 	[ ] Arousable	[ ] Asleep	[ ] Unresponsive    Side Effects:	[X ] None	[ ] Nausea	[ ] Vomiting	[ ] Pruritus  		[ ] Other:    Vital Signs Last 24 Hrs  T(C): 37.1 (05 Oct 2023 07:00), Max: 37.1 (05 Oct 2023 07:00)  T(F): 98.8 (05 Oct 2023 07:00), Max: 98.8 (05 Oct 2023 07:00)  HR: 67 (05 Oct 2023 09:00) (59 - 89)  BP: 107/70 (05 Oct 2023 09:00) (77/49 - 151/83)  BP(mean): 78 (05 Oct 2023 09:00) (56 - 97)  RR: 14 (05 Oct 2023 09:00) (10 - 20)  SpO2: 100% (05 Oct 2023 09:00) (99% - 100%)    Parameters below as of 05 Oct 2023 03:00  Patient On (Oxygen Delivery Method): ventilator    O2 Concentration (%): 50    ASSESSMENT/ PLAN    Therapy to  be:	[ ] Continue   [ X] Discontinued   [X ] Change to prn Analgesics    Documentation and Verification of current medications:   [X] Done	[ ] Not done, not elligible    Comments: IV PCA discontinued, reordered ON HOLD for when patient is extubated. PRN Oral/IV opioids and/or Adjuvant non-opioid medication to be ordered at this point.    Progress Note written now but Patient was seen earlier. No

## 2023-10-18 ENCOUNTER — RX RENEWAL (OUTPATIENT)
Age: 58
End: 2023-10-18

## 2023-10-27 ENCOUNTER — RX RENEWAL (OUTPATIENT)
Age: 58
End: 2023-10-27

## 2023-11-06 ENCOUNTER — OUTPATIENT (OUTPATIENT)
Dept: OUTPATIENT SERVICES | Facility: HOSPITAL | Age: 58
LOS: 1 days | End: 2023-11-06
Payer: COMMERCIAL

## 2023-11-06 ENCOUNTER — APPOINTMENT (OUTPATIENT)
Dept: ULTRASOUND IMAGING | Facility: CLINIC | Age: 58
End: 2023-11-06
Payer: COMMERCIAL

## 2023-11-06 ENCOUNTER — APPOINTMENT (OUTPATIENT)
Dept: RADIOLOGY | Facility: CLINIC | Age: 58
End: 2023-11-06
Payer: COMMERCIAL

## 2023-11-06 DIAGNOSIS — Z96.651 PRESENCE OF RIGHT ARTIFICIAL KNEE JOINT: Chronic | ICD-10-CM

## 2023-11-06 DIAGNOSIS — Z98.890 OTHER SPECIFIED POSTPROCEDURAL STATES: Chronic | ICD-10-CM

## 2023-11-06 DIAGNOSIS — K90.0 CELIAC DISEASE: ICD-10-CM

## 2023-11-06 DIAGNOSIS — Z98.51 TUBAL LIGATION STATUS: Chronic | ICD-10-CM

## 2023-11-06 DIAGNOSIS — R10.11 RIGHT UPPER QUADRANT PAIN: ICD-10-CM

## 2023-11-06 DIAGNOSIS — Z98.82 BREAST IMPLANT STATUS: Chronic | ICD-10-CM

## 2023-11-06 PROCEDURE — 76700 US EXAM ABDOM COMPLETE: CPT | Mod: 26

## 2023-11-06 PROCEDURE — 76700 US EXAM ABDOM COMPLETE: CPT

## 2023-11-10 ENCOUNTER — RX RENEWAL (OUTPATIENT)
Age: 58
End: 2023-11-10

## 2023-11-17 ENCOUNTER — RX RENEWAL (OUTPATIENT)
Age: 58
End: 2023-11-17

## 2023-12-01 NOTE — REASON FOR VISIT
[facility] AMBULATORY ENCOUNTER  URGENT CARE VISIT    CHIEF COMPLAINT:    Chief Complaint   Patient presents with   • Ear Pain       SUBJECTIVE     Ebony Bhatti is a 68 year old female who presents to the urgent care clinic for evaluation of pain in and around her R ear radiating down the R side of her pain with decreased neck ROM and fatigue.      Symptoms started 4 days ago.  Patient endorses pain in and around her R ear.  No known inciting factors around the time of symptoms onset, including preceding/current URI symptoms of cough, congestion, rhinorrhea, sore throat, or fevers.  No sick contacts or known exposures to COVID, Flu, RSV, or Strep throat.  She did not fall her injure her neck, and does not think she slept on it wrong.  She denies midline neck pain, pain radiating into her upper extremities, or upper extremity numbness/tingling.  She initially stated that her lymph nodes feel inflamed, though denies any palpable lymph nodes along the front of her neck or behind her ear.  She has had Strep throat several times in the past and states this does not feel like Strep throat as she is not experiencing throat pain or fevers.  She has a remote hx of Mono when she was in 6th grade.  Of note, she reports feeling fatigued and is concerned for possible lymphoma, as lymphoma tends to run in her family.  She denies unintentional weight loss or night sweats.  Additionally, she reports having a rash after a tick bite over the summer.  The bite occurred out of state in [place] and did not have a bulls-eye or target-like appearance.  Medication trials include ibuprofen and tylenol.  ROS negative for fever, excessive fatigue, chest pain, shortness of breath, cough, wheezing, stridor, trismus, voice change/hoarseness, abdominal pain, nausea, vomiting, diarrhea, new rashes, leg swelling, or loss of sense of taste or smell.    PCP: Pcp, Verify     PROBLEM LIST:    Patient Active Problem List   Diagnosis   • Pain in joint, lower  leg   • Osteoarthrosis, unspecified whether generalized or localized, lower leg   • S/P total knee arthroplasty   • History of hyperglycemia   • History of depression   • Vitamin D deficiency   • Hyperlipidemia   • Varicose veins of legs   • Reactive airways dysfunction syndrome (CMD)   • Right hip pain   • Sciatica of right side        MEDICATIONS:    Current Outpatient Medications   Medication Sig Dispense Refill   • amoxicillin-clavulanate (AUGMENTIN) 875-125 MG per tablet Take 1 tablet by mouth in the morning and 1 tablet in the evening. Do all this for 7 days. 14 tablet 0   • albuterol 108 (90 Base) MCG/ACT inhaler Inhale 2 puffs into the lungs every 4 hours as needed for Shortness of Breath or Wheezing. 18 g 0   • fluticasone (FLONASE ALLERGY RELIEF) 50 MCG/ACT nasal spray Spray 2 sprays in each nostril daily. 16 g 12   • omeprazole 20 MG tablet Take 1 tablet by mouth 2 times daily (before meals). 60 tablet 12   • venlafaxine XR (EFFEXOR XR) 150 MG 24 hr capsule Take 300 mg by mouth daily. Dose: 2 Caps (= 300 mg)       No current facility-administered medications for this visit.         OBJECTIVE     PHYSICAL EXAM:   Visit Vitals  /80 (BP Location: RUE - Right upper extremity, Patient Position: Sitting, Cuff Size: Regular)   Pulse 82   Temp 97.8 °F (36.6 °C) (Oral)   Resp 16   Ht 5' 8\" (1.727 m)   Wt 81.6 kg (180 lb)   SpO2 96%   BMI 27.37 kg/m²     Physical Exam  Vitals and nursing note reviewed.   Constitutional:       General: She is not in acute distress.     Appearance: She is well-developed. She is not ill-appearing, toxic-appearing or diaphoretic.   HENT:      Head: Normocephalic and atraumatic.      Right Ear: Tympanic membrane, ear canal and external ear normal. There is no impacted cerumen.      Left Ear: Tympanic membrane, ear canal and external ear normal. There is no impacted cerumen.      Nose: Nose normal. No congestion or rhinorrhea.      Mouth/Throat:      Mouth: Mucous membranes are  moist.      Tongue: No lesions. Tongue does not deviate from midline.      Palate: No mass and lesions.      Pharynx: Oropharynx is clear. Uvula midline. Posterior oropharyngeal erythema present. No pharyngeal swelling, oropharyngeal exudate or uvula swelling.      Tonsils: No tonsillar exudate or tonsillar abscesses. 1+ on the right. 1+ on the left.   Eyes:      General: No scleral icterus.        Right eye: No discharge.         Left eye: No discharge.      Extraocular Movements: Extraocular movements intact.      Conjunctiva/sclera: Conjunctivae normal.   Neck:      Trachea: No tracheal deviation.      Comments: Negative Spurling's sign bilaterally.  Cardiovascular:      Rate and Rhythm: Normal rate.   Pulmonary:      Effort: Pulmonary effort is normal. No respiratory distress.   Musculoskeletal:         General: No deformity.      Cervical back: Rigidity and tenderness (along the anterior and posterior cervical chain) present. No edema, erythema or signs of trauma. Pain with movement and muscular tenderness (along the R SCM) present. No spinous process tenderness. Decreased range of motion (particularly with head rotation to either side and mildly with side bending).   Lymphadenopathy:      Cervical: No cervical adenopathy (I do not appreciate any obvious anterior cervical chain or posterior cervical lymphadenopathy).      Right cervical: No superficial, deep or posterior cervical adenopathy.     Left cervical: No superficial, deep or posterior cervical adenopathy.   Skin:     General: Skin is warm and dry.      Coloration: Skin is not jaundiced or pale.      Findings: No bruising, erythema, lesion or rash.   Neurological:      Mental Status: She is alert.      Cranial Nerves: Cranial nerve deficit: CN II-XII grossly intact.      Motor: No abnormal muscle tone.      Coordination: Coordination normal.   Psychiatric:         Mood and Affect: Mood normal.         Behavior: Behavior normal.         Thought Content:  Thought content normal.         Judgment: Judgment normal.         LABORATORY DATA:    Recent Results (from the past 24 hour(s))   Streptococcus group A PCR    Collection Time: 12/01/23 11:06 AM    Specimen: Throat; Swab   Result Value Ref Range    STREPTOCOCCUS GROUP A PCR Not Detected Not Detected   Comprehensive Metabolic Panel    Collection Time: 12/01/23 11:11 AM   Result Value Ref Range    Fasting Status      Sodium 141 135 - 145 mmol/L    Potassium 5.6 (H) 3.4 - 5.1 mmol/L    Chloride 106 97 - 110 mmol/L    Carbon Dioxide 29 21 - 32 mmol/L    Anion Gap 12 7 - 19 mmol/L    Glucose 93 70 - 99 mg/dL    BUN 11 6 - 20 mg/dL    Creatinine 0.70 0.51 - 0.95 mg/dL    Glomerular Filtration Rate >90 >=60    BUN/Cr 16 7 - 25    Calcium 10.3 (H) 8.4 - 10.2 mg/dL    Bilirubin, Total 0.7 0.2 - 1.0 mg/dL    GOT/AST 29 <=37 Units/L    GPT/ALT 32 <64 Units/L    Alkaline Phosphatase 60 45 - 117 Units/L    Albumin 4.0 3.6 - 5.1 g/dL    Protein, Total 8.0 6.4 - 8.2 g/dL    Globulin 4.0 2.0 - 4.0 g/dL    A/G Ratio 1.0 1.0 - 2.4   Monospot With Reflex    Collection Time: 12/01/23 11:11 AM   Result Value Ref Range    Mononucleosis Screen Negative Negative   CBC with Automated Differential (performable only)    Collection Time: 12/01/23 11:11 AM   Result Value Ref Range    WBC 9.1 4.2 - 11.0 K/mcL    RBC 4.57 4.00 - 5.20 mil/mcL    HGB 14.2 12.0 - 15.5 g/dL    HCT 43.1 36.0 - 46.5 %    MCV 94.3 78.0 - 100.0 fl    MCH 31.1 26.0 - 34.0 pg    MCHC 32.9 32.0 - 36.5 g/dL    RDW-CV 12.5 11.0 - 15.0 %    RDW-SD 43.1 39.0 - 50.0 fL     140 - 450 K/mcL    Neutrophil, Percent 70 %    Lymphocytes, Percent 21 %    Mono, Percent 8 %    Eosinophils, Percent 1 %    Basophils, Percent 0 %    Immature Granulocytes 0 %    Absolute Neutrophils 6.3 1.8 - 7.7 K/mcL    Absolute Lymphocytes 1.9 1.0 - 4.0 K/mcL    Absolute Monocytes 0.7 0.3 - 0.9 K/mcL    Absolute Eosinophils  0.1 0.0 - 0.5 K/mcL    Absolute Basophils 0.0 0.0 - 0.3 K/mcL    Absolute  Immature Granulocytes 0.0 0.0 - 0.2 K/mcL       ASSESSMENT/PLAN     Ebony Bhatti is a 68 year old female who presents to the urgent care clinic for evaluation of pain in and around her R ear radiating down the R side of her pain with decreased neck ROM and fatigue x5d.      1. Neck pain on right side  Etiology unclear; she initially reported that her \"glands feel swollen\" though I do not appreciate any obvious palpable anterior or post-cervical lymphadenopathy.  Monospot negative.  EBV antibodies pending.  Strep PCR negative.  Will cover with oral abx in case of bacterial lymphadenitis.  Does not appear c/w MSK etiology.  I considered occipital neuralgia though less likely.  Patient concerned for lymphoma as there is a family history of this.  WBC wnls.  No night sweats, unintentional weight loss, or B-symptoms.  Less likely, however, if her symptoms do not improve or worsen, I recommend she follow up with her PCP for further evaluation and possible neck imaging.    2. Cervical lymphadenitis  -     Streptococcus group A PCR  -     amoxicillin-clavulanate (AUGMENTIN) 875-125 MG per tablet; Take 1 tablet by mouth in the morning and 1 tablet in the evening. Do all this for 7 days.    3. Right otitis media with effusion        - INCSs        - Flonase nasal spray    4. Malaise and fatigue  -     Comprehensive Metabolic Panel  -     CBC with Automated Differential  -     Lyme IgG and IgM Antibody Screen with reflex to Immunoblot  -     Monospot With Reflex  -     Ashley Barr Virus Antibody Screen    5. Hyperkalemia  K 5.6.  May be d/t hemolyzed sample.  No prior hx hyperkalemia.  Asymptomatic.        -     Comprehensive Metabolic Panel        - Increase po fluid intake        - Follow up with PCP for repeat CMP within 1 week    Preventative measures, supportive cares, and return precautions for the above diagnoses were discussed with the patient as appropriate. Patient was referred to the AVS for further instructions.  [Follow-Up Visit] : a follow-up visit for Anticipatory guidance provided in AVS.  If symptoms persist, worsen, new symptoms emerge, patient does not improve, or patient has any other concerns they were advised to please follow up in urgent care, emergency room, or with PCP. Recommended to monitor for red flag symptoms that would warrant immediate re-evaluation in the UC or ED.  Discussed etiologies and differential with the associated diagnosis/symptoms and common complications. Discussed treatment plan with patient, who understands and agrees with plan. The risks, benefits, possible side effects, and drug interactions of medications ordered were reviewed with the patient. Medication instructions were discussed with patient and the consequences of not taking the medications.    Norbert Harris MD  [facility]   [Knee Pain] : knee pain [FreeTextEntry2] : s/p right TKR done 7/20/2021

## 2023-12-12 ENCOUNTER — APPOINTMENT (OUTPATIENT)
Dept: GASTROENTEROLOGY | Facility: CLINIC | Age: 58
End: 2023-12-12

## 2023-12-25 ENCOUNTER — RX RENEWAL (OUTPATIENT)
Age: 58
End: 2023-12-25

## 2024-01-16 ENCOUNTER — RX RENEWAL (OUTPATIENT)
Age: 59
End: 2024-01-16

## 2024-02-06 ENCOUNTER — RX RENEWAL (OUTPATIENT)
Age: 59
End: 2024-02-06

## 2024-02-13 ENCOUNTER — RX RENEWAL (OUTPATIENT)
Age: 59
End: 2024-02-13

## 2024-02-27 ENCOUNTER — NON-APPOINTMENT (OUTPATIENT)
Age: 59
End: 2024-02-27

## 2024-02-27 ENCOUNTER — APPOINTMENT (OUTPATIENT)
Dept: INTERNAL MEDICINE | Facility: CLINIC | Age: 59
End: 2024-02-27
Payer: COMMERCIAL

## 2024-02-27 VITALS
SYSTOLIC BLOOD PRESSURE: 118 MMHG | WEIGHT: 198 LBS | HEIGHT: 66 IN | HEART RATE: 80 BPM | TEMPERATURE: 98.4 F | DIASTOLIC BLOOD PRESSURE: 76 MMHG | BODY MASS INDEX: 31.82 KG/M2 | OXYGEN SATURATION: 95 %

## 2024-02-27 DIAGNOSIS — F41.9 ANXIETY DISORDER, UNSPECIFIED: ICD-10-CM

## 2024-02-27 DIAGNOSIS — F32.A ANXIETY DISORDER, UNSPECIFIED: ICD-10-CM

## 2024-02-27 PROCEDURE — 99204 OFFICE O/P NEW MOD 45 MIN: CPT

## 2024-02-27 PROCEDURE — 93000 ELECTROCARDIOGRAM COMPLETE: CPT

## 2024-02-27 RX ORDER — SEMAGLUTIDE 0.25 MG/.5ML
0.25 INJECTION, SOLUTION SUBCUTANEOUS
Qty: 1 | Refills: 0 | Status: ACTIVE | COMMUNITY
Start: 2023-01-23 | End: 1900-01-01

## 2024-02-27 RX ORDER — TOPIRAMATE 25 MG/1
25 TABLET, FILM COATED ORAL
Qty: 30 | Refills: 0 | Status: ACTIVE | COMMUNITY
Start: 2024-02-27 | End: 1900-01-01

## 2024-02-27 RX ORDER — VORTIOXETINE 20 MG/1
20 TABLET, FILM COATED ORAL
Qty: 90 | Refills: 1 | Status: ACTIVE | COMMUNITY
Start: 2019-05-08 | End: 1900-01-01

## 2024-02-27 NOTE — HEALTH RISK ASSESSMENT
[No] : No [No falls in past year] : Patient reported no falls in the past year [0] : 2) Feeling down, depressed, or hopeless: Not at all (0) [Former] : Former [0-4] : 0-4 [< 15 Years] : < 15 Years

## 2024-02-28 ENCOUNTER — RX RENEWAL (OUTPATIENT)
Age: 59
End: 2024-02-28

## 2024-02-28 LAB
ALBUMIN SERPL ELPH-MCNC: 4.5 G/DL
ALP BLD-CCNC: 76 U/L
ALT SERPL-CCNC: 19 U/L
ANION GAP SERPL CALC-SCNC: 12 MMOL/L
APPEARANCE: CLEAR
AST SERPL-CCNC: 24 U/L
BACTERIA: NEGATIVE /HPF
BILIRUB DIRECT SERPL-MCNC: 0.2 MG/DL
BILIRUB INDIRECT SERPL-MCNC: 0.6 MG/DL
BILIRUB SERPL-MCNC: 0.8 MG/DL
BILIRUBIN URINE: NEGATIVE
BLOOD URINE: NEGATIVE
BUN SERPL-MCNC: 19 MG/DL
CALCIUM SERPL-MCNC: 9.6 MG/DL
CAST: 0 /LPF
CHLORIDE SERPL-SCNC: 104 MMOL/L
CHOLEST SERPL-MCNC: 175 MG/DL
CO2 SERPL-SCNC: 24 MMOL/L
COLOR: YELLOW
CREAT SERPL-MCNC: 0.74 MG/DL
EGFR: 93 ML/MIN/1.73M2
EPITHELIAL CELLS: 2 /HPF
ESTIMATED AVERAGE GLUCOSE: 80 MG/DL
GLUCOSE QUALITATIVE U: NEGATIVE MG/DL
GLUCOSE SERPL-MCNC: 79 MG/DL
HBA1C MFR BLD HPLC: 4.4 %
HCT VFR BLD CALC: 37.5 %
HDLC SERPL-MCNC: 67 MG/DL
HGB BLD-MCNC: 12.1 G/DL
KETONES URINE: NEGATIVE MG/DL
LDLC SERPL CALC-MCNC: 95 MG/DL
LEUKOCYTE ESTERASE URINE: ABNORMAL
MCHC RBC-ENTMCNC: 32.3 GM/DL
MCHC RBC-ENTMCNC: 33.3 PG
MCV RBC AUTO: 103.3 FL
MICROSCOPIC-UA: NORMAL
NITRITE URINE: NEGATIVE
NONHDLC SERPL-MCNC: 107 MG/DL
PH URINE: 5.5
PLATELET # BLD AUTO: 342 K/UL
POTASSIUM SERPL-SCNC: 4.8 MMOL/L
PROT SERPL-MCNC: 7.5 G/DL
PROTEIN URINE: NEGATIVE MG/DL
RBC # BLD: 3.63 M/UL
RBC # FLD: 13 %
RED BLOOD CELLS URINE: NORMAL /HPF
REVIEW: NORMAL
SODIUM SERPL-SCNC: 140 MMOL/L
SPECIFIC GRAVITY URINE: 1.02
TRIGL SERPL-MCNC: 62 MG/DL
TSH SERPL-ACNC: 2.01 UIU/ML
UROBILINOGEN URINE: 0.2 MG/DL
WBC # FLD AUTO: 7.9 K/UL
WHITE BLOOD CELLS URINE: 2 /HPF

## 2024-03-13 ENCOUNTER — RX RENEWAL (OUTPATIENT)
Age: 59
End: 2024-03-13

## 2024-03-13 RX ORDER — PANTOPRAZOLE 40 MG/1
40 TABLET, DELAYED RELEASE ORAL TWICE DAILY
Qty: 180 | Refills: 1 | Status: ACTIVE | COMMUNITY
Start: 2019-05-08 | End: 1900-01-01

## 2024-03-13 RX ORDER — LEVOTHYROXINE SODIUM 0.05 MG/1
50 TABLET ORAL
Qty: 90 | Refills: 1 | Status: ACTIVE | COMMUNITY
Start: 2019-08-06 | End: 1900-01-01

## 2024-03-20 ENCOUNTER — RX RENEWAL (OUTPATIENT)
Age: 59
End: 2024-03-20

## 2024-04-04 ENCOUNTER — APPOINTMENT (OUTPATIENT)
Dept: DERMATOLOGY | Facility: CLINIC | Age: 59
End: 2024-04-04
Payer: COMMERCIAL

## 2024-04-04 DIAGNOSIS — L98.8 OTHER SPECIFIED DISORDERS OF THE SKIN AND SUBCUTANEOUS TISSUE: ICD-10-CM

## 2024-04-04 DIAGNOSIS — D23.9 OTHER BENIGN NEOPLASM OF SKIN, UNSPECIFIED: ICD-10-CM

## 2024-04-04 DIAGNOSIS — L13.0 DERMATITIS HERPETIFORMIS: ICD-10-CM

## 2024-04-04 DIAGNOSIS — L20.9 ATOPIC DERMATITIS, UNSPECIFIED: ICD-10-CM

## 2024-04-04 DIAGNOSIS — Z79.899 OTHER LONG TERM (CURRENT) DRUG THERAPY: ICD-10-CM

## 2024-04-04 PROCEDURE — 99214 OFFICE O/P EST MOD 30 MIN: CPT | Mod: 25

## 2024-04-04 PROCEDURE — 11900 INJECT SKIN LESIONS </W 7: CPT

## 2024-04-04 RX ORDER — TRETINOIN 0.25 MG/G
0.03 CREAM TOPICAL
Qty: 45 | Refills: 5 | Status: ACTIVE | COMMUNITY
Start: 2023-03-21 | End: 1900-01-01

## 2024-04-04 RX ORDER — DAPSONE 25 MG/1
25 TABLET ORAL
Qty: 360 | Refills: 0 | Status: ACTIVE | COMMUNITY
Start: 2022-12-02 | End: 1900-01-01

## 2024-04-04 RX ORDER — CLOBETASOL PROPIONATE 0.5 MG/G
0.05 OINTMENT TOPICAL
Qty: 1 | Refills: 4 | Status: ACTIVE | COMMUNITY
Start: 2023-02-13 | End: 1900-01-01

## 2024-04-05 ENCOUNTER — NON-APPOINTMENT (OUTPATIENT)
Age: 59
End: 2024-04-05

## 2024-04-05 LAB
ALBUMIN SERPL ELPH-MCNC: 4.7 G/DL
ALP BLD-CCNC: 76 U/L
ALT SERPL-CCNC: 19 U/L
AST SERPL-CCNC: 20 U/L
BASOPHILS # BLD AUTO: 0.08 K/UL
BASOPHILS NFR BLD AUTO: 1.2 %
BILIRUB DIRECT SERPL-MCNC: 0.2 MG/DL
BILIRUB INDIRECT SERPL-MCNC: 0.7 MG/DL
BILIRUB SERPL-MCNC: 0.9 MG/DL
CHOLEST SERPL-MCNC: 174 MG/DL
EOSINOPHIL # BLD AUTO: 0.29 K/UL
EOSINOPHIL NFR BLD AUTO: 4.2 %
HCT VFR BLD CALC: 36.5 %
HDLC SERPL-MCNC: 62 MG/DL
HGB BLD-MCNC: 11.9 G/DL
IMM GRANULOCYTES NFR BLD AUTO: 0.6 %
LDLC SERPL CALC-MCNC: 94 MG/DL
LYMPHOCYTES # BLD AUTO: 2.24 K/UL
LYMPHOCYTES NFR BLD AUTO: 32.7 %
MAN DIFF?: NORMAL
MCHC RBC-ENTMCNC: 32.6 GM/DL
MCHC RBC-ENTMCNC: 33 PG
MCV RBC AUTO: 101.1 FL
MONOCYTES # BLD AUTO: 0.65 K/UL
MONOCYTES NFR BLD AUTO: 9.5 %
NEUTROPHILS # BLD AUTO: 3.54 K/UL
NEUTROPHILS NFR BLD AUTO: 51.8 %
NONHDLC SERPL-MCNC: 111 MG/DL
PLATELET # BLD AUTO: 339 K/UL
PROT SERPL-MCNC: 7.1 G/DL
RBC # BLD: 3.58 M/UL
RBC # BLD: 3.61 M/UL
RBC # FLD: 13.2 %
RETICS # AUTO: 3.1 %
RETICS AGGREG/RBC NFR: 110.6 K/UL
TRIGL SERPL-MCNC: 96 MG/DL
WBC # FLD AUTO: 6.84 K/UL

## 2024-04-05 RX ORDER — DUPILUMAB 300 MG/2ML
300 INJECTION, SOLUTION SUBCUTANEOUS
Qty: 4 | Refills: 6 | Status: ACTIVE | COMMUNITY
Start: 2022-07-21 | End: 1900-01-01

## 2024-04-16 NOTE — HISTORY OF PRESENT ILLNESS
[FreeTextEntry1] : fu: dermatitis herpetiformis [de-identified] : 58F last seen June 2023 by Dr. Villafana presenting for:   1. F/u Dermatitis herpetiformis  - started dapsone 50mg daily on 12/1/22, notes significant improvement. Not following a gluten free diet. Blisters on feet and extensor forearms after eating.   - lab work for G6PD nrml, HLA DQ2 positive (supportive of Celiac disease) on 11/25/22 - repeat endomysial IgA, gliadin deamidated antibodies IgG+, IgA+, transglutaminase IgG neg, IgA+ from positive 9/2023.  - Endoscopy/colonoscopy with Dr. Taylor in August 2023 significant for paucity of folds in duodenum with bx showing mild-mod increased intrapithelial lymphocytes and mild to moderate villous blunting c/w dx of celiac disease.  - outside path report from 2/12/21 c/w   2. F/u Eczema on hands / feet  - on dupixent for past 6-7 years  - has been using topical clobetasol since Feb 2022 using BID with significant improvement  - pleased with the results- no oral or ocular ADEs.   3. F/u rhytides on the face, started tretinoin 0.025% in Mar 2023. Tolerating well, uses inconsistently. Sometimes with flaking on medial cheeks.

## 2024-04-16 NOTE — ASSESSMENT
[FreeTextEntry1] : 1. Dermatitis herpetiformis, bx-proven, stable, chronic  - lab work for G6PD nrml, HLA DQ2 positive (supportive of Celiac disease) on 11/25/22 - repeat endomysial IgA, gliadin deamidated antibodies IgG+, IgA+, transglutaminase IgG neg, IgA+ from positive 9/2023.  - Endoscopy/colonoscopy with Dr. Taylor in August 2023 significant for paucity of folds in duodenum with bx showing mild-mod increased intraepithelial lymphocytes and mild to moderate villous blunting c/w dx of celiac disease.  - outside path report from 2/12/21 c/w DH - continue on oral dapsone 50mg daily   2. High risk medication use - March 2023: CBC, reticulocyte count, LFTs umrk - June 2023 CBC, reticulocyte count, LFTs all WNL.  - Repeat CBC, reticulocyte count, LFTs today  3. Eczematous dermatitis - mild, flare, chronic  Significantly improved on dupixent -- now <1% BSA - discussed course and chronic nature of condition  - discussed expectations of therapy - Education provided regarding short shower, gently patting dry, application of moisturizer  - Education provided regarding fragrance free products - continue with dupixent 300mg [id number]. SED - c/w topical clobetasol 0.05% ointment BID to AA. discussed using under occlusion with Saran Wrap   4. Rhytides - C/w tretinoin 0.025% cream qHS. Proper use discussed.  5. DF, left posterior shoulder - Diagnosis reviewed.  - As lesion is symptomatic, Pt would like treatment with IL-TAC today.  - Procedure note: Intralesional injection of triamcinolone (IL-TAC)  Site(s): L posterior shoulder 1 lesion injected with intralesional triamcinolone  10 mg/cc, Lot [id number], Exp 9/2024 0.1 ccs injected total Verbal informed consent obtained. Risks/benefits/alternatives discussed including but not limited to risk of bleeding, hypopigmentation, striae and atrophy as well as possible lack of treatment efficacy and need for repeat treatments. Site confirmed. Area prepped with alcohol. Discussed wound care instructions. Patient tolerated well with no immediate complications. - Patient aware that she can use  clobetasol for pruritus, BID up to 2 weeks on, 1 week off PRN pruritus. SED as above.   RTC 6 months

## 2024-04-16 NOTE — PHYSICAL EXAM
[Alert] : alert [Oriented x 3] : ~L oriented x 3 [Well Nourished] : well nourished [Conjunctiva Non-injected] : conjunctiva non-injected [No Visual Lymphadenopathy] : no visual  lymphadenopathy [No Clubbing] : no clubbing [No Edema] : no edema [No Bromhidrosis] : no bromhidrosis [No Chromhidrosis] : no chromhidrosis [FreeTextEntry3] : Small pink patches b/l extensor elbows  Crusted pustule within erythematous plaque on the left plantar foot  Mild erythema b/l palms  Small pink and brown firm papule with + dimple sign on the L posterior shoulder

## 2024-05-13 ENCOUNTER — RX RENEWAL (OUTPATIENT)
Age: 59
End: 2024-05-13

## 2024-05-13 RX ORDER — HYDROCORTISONE 25 MG/G
2.5 CREAM TOPICAL
Qty: 30 | Refills: 2 | Status: ACTIVE | COMMUNITY
Start: 2024-05-13 | End: 1900-01-01

## 2024-05-31 ENCOUNTER — NON-APPOINTMENT (OUTPATIENT)
Age: 59
End: 2024-05-31

## 2024-05-31 ENCOUNTER — APPOINTMENT (OUTPATIENT)
Dept: INTERNAL MEDICINE | Facility: CLINIC | Age: 59
End: 2024-05-31
Payer: COMMERCIAL

## 2024-05-31 VITALS
OXYGEN SATURATION: 95 % | BODY MASS INDEX: 31.26 KG/M2 | SYSTOLIC BLOOD PRESSURE: 112 MMHG | HEIGHT: 66 IN | HEART RATE: 73 BPM | TEMPERATURE: 98.9 F | DIASTOLIC BLOOD PRESSURE: 76 MMHG | WEIGHT: 194.5 LBS

## 2024-05-31 DIAGNOSIS — R73.03 PREDIABETES.: ICD-10-CM

## 2024-05-31 DIAGNOSIS — E03.9 HYPOTHYROIDISM, UNSPECIFIED: ICD-10-CM

## 2024-05-31 DIAGNOSIS — E66.9 OBESITY, UNSPECIFIED: ICD-10-CM

## 2024-05-31 DIAGNOSIS — G43.909 MIGRAINE, UNSPECIFIED, NOT INTRACTABLE, W/OUT STATUS MIGRAINOSUS: ICD-10-CM

## 2024-05-31 DIAGNOSIS — I10 ESSENTIAL (PRIMARY) HYPERTENSION: ICD-10-CM

## 2024-05-31 DIAGNOSIS — E78.5 HYPERLIPIDEMIA, UNSPECIFIED: ICD-10-CM

## 2024-05-31 PROCEDURE — 93000 ELECTROCARDIOGRAM COMPLETE: CPT

## 2024-05-31 PROCEDURE — G2211 COMPLEX E/M VISIT ADD ON: CPT | Mod: NC

## 2024-05-31 PROCEDURE — 99214 OFFICE O/P EST MOD 30 MIN: CPT

## 2024-05-31 RX ORDER — ATORVASTATIN CALCIUM 20 MG/1
20 TABLET, FILM COATED ORAL
Qty: 90 | Refills: 1 | Status: ACTIVE | COMMUNITY
Start: 2023-01-16 | End: 1900-01-01

## 2024-05-31 RX ORDER — METOPROLOL SUCCINATE 50 MG/1
50 TABLET, EXTENDED RELEASE ORAL
Qty: 90 | Refills: 1 | Status: ACTIVE | COMMUNITY
Start: 2019-04-12 | End: 1900-01-01

## 2024-05-31 RX ORDER — AMLODIPINE AND VALSARTAN 5; 160 MG/1; MG/1
5-160 TABLET, FILM COATED ORAL
Qty: 90 | Refills: 1 | Status: ACTIVE | COMMUNITY
Start: 2019-04-12 | End: 1900-01-01

## 2024-05-31 NOTE — REVIEW OF SYSTEMS
[Fever] : no fever [Night Sweats] : no night sweats [Discharge] : no discharge [Vision Problems] : no vision problems [Earache] : no earache [Nasal Discharge] : no nasal discharge [Chest Pain] : no chest pain [Orthopnea] : no orthopnea [Shortness Of Breath] : no shortness of breath [Abdominal Pain] : no abdominal pain [Vomiting] : no vomiting [Dysuria] : no dysuria [Hematuria] : no hematuria [Joint Pain] : no joint pain [Muscle Pain] : no muscle pain [Itching] : no itching [Skin Rash] : no skin rash [Headache] : headache [Memory Loss] : no memory loss [Suicidal] : not suicidal [Easy Bleeding] : no easy bleeding

## 2024-05-31 NOTE — PLAN
[FreeTextEntry1] : Anxiety/depression - c/w trintellix, denies SI/HI Eczema- on dupixent Migraine headaches - c/w topamax, f/u w/ neurology HTN/HLD - c/w same Hypothyroidism - c/w same Obesity - advised diet/ls modifications

## 2024-05-31 NOTE — HISTORY OF PRESENT ILLNESS
[FreeTextEntry1] : follow-up [de-identified] : 59 year old female here for a f/u visit.  Currently she has no acute medical complaints such as chest pain, shortness of breath or cough.

## 2024-08-04 NOTE — ADDENDUM
[FreeTextEntry1] : I, Bhanu Harrison, acted solely as a scribe for Dr. Sergio Ruffin on this date 07/06/2022.\par All medical record entries made by the Scribe were at my, Dr. Sergio Ruffin, direction and personally dictated by me on 07/06/2022. I have reviewed the chart and agree that the record accurately reflects my personal performance of the history, physical exam, assessment and plan. I have also personally directed, reviewed, and agreed with the chart.  [Time Spent: ___ minutes] : I have spent [unfilled] minutes of time on the encounter.

## 2024-09-03 ENCOUNTER — APPOINTMENT (OUTPATIENT)
Dept: INTERNAL MEDICINE | Facility: CLINIC | Age: 59
End: 2024-09-03
Payer: COMMERCIAL

## 2024-09-03 ENCOUNTER — RX RENEWAL (OUTPATIENT)
Age: 59
End: 2024-09-03

## 2024-09-03 VITALS
WEIGHT: 185.44 LBS | SYSTOLIC BLOOD PRESSURE: 100 MMHG | HEART RATE: 75 BPM | OXYGEN SATURATION: 95 % | DIASTOLIC BLOOD PRESSURE: 76 MMHG | BODY MASS INDEX: 29.8 KG/M2 | HEIGHT: 66 IN | TEMPERATURE: 98.7 F

## 2024-09-03 DIAGNOSIS — R73.03 PREDIABETES.: ICD-10-CM

## 2024-09-03 DIAGNOSIS — E03.9 HYPOTHYROIDISM, UNSPECIFIED: ICD-10-CM

## 2024-09-03 DIAGNOSIS — R82.90 UNSPECIFIED ABNORMAL FINDINGS IN URINE: ICD-10-CM

## 2024-09-03 DIAGNOSIS — G43.909 MIGRAINE, UNSPECIFIED, NOT INTRACTABLE, W/OUT STATUS MIGRAINOSUS: ICD-10-CM

## 2024-09-03 DIAGNOSIS — E66.9 OBESITY, UNSPECIFIED: ICD-10-CM

## 2024-09-03 DIAGNOSIS — E78.5 HYPERLIPIDEMIA, UNSPECIFIED: ICD-10-CM

## 2024-09-03 DIAGNOSIS — I10 ESSENTIAL (PRIMARY) HYPERTENSION: ICD-10-CM

## 2024-09-03 PROCEDURE — 99214 OFFICE O/P EST MOD 30 MIN: CPT

## 2024-09-03 PROCEDURE — G2211 COMPLEX E/M VISIT ADD ON: CPT | Mod: NC

## 2024-09-03 RX ORDER — VALSARTAN 160 MG/1
160 TABLET, COATED ORAL
Qty: 90 | Refills: 1 | Status: ACTIVE | COMMUNITY
Start: 2024-09-03 | End: 1900-01-01

## 2024-09-05 PROBLEM — R82.90 ABNORMAL URINALYSIS: Status: ACTIVE | Noted: 2024-09-05

## 2024-09-05 LAB
25(OH)D3 SERPL-MCNC: 29.6 NG/ML
ALBUMIN SERPL ELPH-MCNC: 4.4 G/DL
ALP BLD-CCNC: 109 U/L
ALT SERPL-CCNC: 19 U/L
ANION GAP SERPL CALC-SCNC: 12 MMOL/L
APPEARANCE: ABNORMAL
AST SERPL-CCNC: 15 U/L
BACTERIA: ABNORMAL /HPF
BILIRUB DIRECT SERPL-MCNC: 0.2 MG/DL
BILIRUB INDIRECT SERPL-MCNC: 0.4 MG/DL
BILIRUB SERPL-MCNC: 0.6 MG/DL
BILIRUBIN URINE: ABNORMAL
BLOOD URINE: NEGATIVE
BUN SERPL-MCNC: 16 MG/DL
CALCIUM OXALATE CRYSTALS: PRESENT
CALCIUM SERPL-MCNC: 9.5 MG/DL
CAST: 5 /LPF
CHLORIDE SERPL-SCNC: 108 MMOL/L
CHOLEST SERPL-MCNC: 187 MG/DL
CO2 SERPL-SCNC: 22 MMOL/L
COLOR: NORMAL
CREAT SERPL-MCNC: 0.81 MG/DL
EGFR: 83 ML/MIN/1.73M2
EPITHELIAL CELLS: 9 /HPF
ESTIMATED AVERAGE GLUCOSE: 77 MG/DL
GLUCOSE QUALITATIVE U: NEGATIVE MG/DL
GLUCOSE SERPL-MCNC: 91 MG/DL
HBA1C MFR BLD HPLC: 4.3 %
HCT VFR BLD CALC: 39.7 %
HDLC SERPL-MCNC: 60 MG/DL
HGB BLD-MCNC: 12.3 G/DL
KETONES URINE: NEGATIVE MG/DL
LDLC SERPL CALC-MCNC: 108 MG/DL
LEUKOCYTE ESTERASE URINE: ABNORMAL
MCHC RBC-ENTMCNC: 31 GM/DL
MCHC RBC-ENTMCNC: 32.4 PG
MCV RBC AUTO: 104.5 FL
MICROSCOPIC-UA: NORMAL
NITRITE URINE: NEGATIVE
NONHDLC SERPL-MCNC: 128 MG/DL
PH URINE: 6.5
PLATELET # BLD AUTO: 362 K/UL
POTASSIUM SERPL-SCNC: 4.6 MMOL/L
PROT SERPL-MCNC: 6.9 G/DL
PROTEIN URINE: 30 MG/DL
RBC # BLD: 3.8 M/UL
RBC # FLD: 14.3 %
RED BLOOD CELLS URINE: NORMAL /HPF
REVIEW: NORMAL
SODIUM SERPL-SCNC: 142 MMOL/L
SPECIFIC GRAVITY URINE: 1.02
T4 FREE SERPL-MCNC: 1.4 NG/DL
TRIGL SERPL-MCNC: 111 MG/DL
TSH SERPL-ACNC: 1.85 UIU/ML
UROBILINOGEN URINE: 1 MG/DL
VIT B12 SERPL-MCNC: 1199 PG/ML
WBC # FLD AUTO: 9.57 K/UL
WHITE BLOOD CELLS URINE: 9 /HPF

## 2024-09-05 RX ORDER — NITROFURANTOIN (MONOHYDRATE/MACROCRYSTALS) 25; 75 MG/1; MG/1
100 CAPSULE ORAL
Qty: 14 | Refills: 0 | Status: ACTIVE | COMMUNITY
Start: 2024-09-05 | End: 1900-01-01

## 2024-09-05 NOTE — PLAN
[FreeTextEntry1] : Anxiety/depression - c/w trintellix, denies SI/HI Eczema- on dupixent Migraine headaches - c/w topamax, f/u w/ neurology HTN- tightly controlled, will discontinue norvasc, c/w valsartan/metoprolol. Advised to monitor home BP. HLD - c/w same, check labs Hypothyroidism - c/w same Obesity - advised diet/ls modifications, has lost 10-15 lbs on ozempic, will check labs

## 2024-09-05 NOTE — REVIEW OF SYSTEMS
[Fatigue] : fatigue [Constipation] : constipation [Fever] : no fever [Chills] : no chills [Discharge] : no discharge [Vision Problems] : no vision problems [Earache] : no earache [Nasal Discharge] : no nasal discharge [Chest Pain] : no chest pain [Orthopnea] : no orthopnea [Shortness Of Breath] : no shortness of breath [Abdominal Pain] : no abdominal pain [Dysuria] : no dysuria [Joint Pain] : no joint pain [Itching] : no itching [Skin Rash] : no skin rash [Headache] : no headache [Memory Loss] : no memory loss [Suicidal] : not suicidal [Easy Bleeding] : no easy bleeding

## 2024-09-05 NOTE — HISTORY OF PRESENT ILLNESS
[FreeTextEntry1] : follow-up, hypertension, obesity, pre-dm [de-identified] : 59 year old female here for a f/u visit. Currently she has no acute medical complaints. She denies any chest pain, shortness of breath or cough.

## 2024-09-19 ENCOUNTER — APPOINTMENT (OUTPATIENT)
Dept: INTERNAL MEDICINE | Facility: CLINIC | Age: 59
End: 2024-09-19

## 2024-09-24 ENCOUNTER — RX RENEWAL (OUTPATIENT)
Age: 59
End: 2024-09-24

## 2024-09-26 ENCOUNTER — RX RENEWAL (OUTPATIENT)
Age: 59
End: 2024-09-26

## 2024-10-08 ENCOUNTER — APPOINTMENT (OUTPATIENT)
Dept: DERMATOLOGY | Facility: CLINIC | Age: 59
End: 2024-10-08
Payer: COMMERCIAL

## 2024-10-08 DIAGNOSIS — L13.0 DERMATITIS HERPETIFORMIS: ICD-10-CM

## 2024-10-08 DIAGNOSIS — L30.9 DERMATITIS, UNSPECIFIED: ICD-10-CM

## 2024-10-08 DIAGNOSIS — Z79.899 OTHER LONG TERM (CURRENT) DRUG THERAPY: ICD-10-CM

## 2024-10-08 PROCEDURE — 99214 OFFICE O/P EST MOD 30 MIN: CPT

## 2024-10-08 RX ORDER — DUPILUMAB 300 MG/2ML
300 INJECTION, SOLUTION SUBCUTANEOUS
Qty: 2 | Refills: 3 | Status: ACTIVE | COMMUNITY
Start: 2024-10-08 | End: 1900-01-01

## 2024-10-23 ENCOUNTER — APPOINTMENT (OUTPATIENT)
Dept: ENDOCRINOLOGY | Facility: CLINIC | Age: 59
End: 2024-10-23
Payer: COMMERCIAL

## 2024-10-23 VITALS
SYSTOLIC BLOOD PRESSURE: 110 MMHG | BODY MASS INDEX: 28.15 KG/M2 | WEIGHT: 175.13 LBS | HEART RATE: 75 BPM | OXYGEN SATURATION: 98 % | DIASTOLIC BLOOD PRESSURE: 75 MMHG | HEIGHT: 66 IN

## 2024-10-23 DIAGNOSIS — E66.9 OBESITY, UNSPECIFIED: ICD-10-CM

## 2024-10-23 DIAGNOSIS — E03.9 HYPOTHYROIDISM, UNSPECIFIED: ICD-10-CM

## 2024-10-23 DIAGNOSIS — R73.03 PREDIABETES.: ICD-10-CM

## 2024-10-23 DIAGNOSIS — I10 ESSENTIAL (PRIMARY) HYPERTENSION: ICD-10-CM

## 2024-10-23 DIAGNOSIS — E78.5 HYPERLIPIDEMIA, UNSPECIFIED: ICD-10-CM

## 2024-10-23 PROCEDURE — 99204 OFFICE O/P NEW MOD 45 MIN: CPT

## 2024-10-24 RX ORDER — TIRZEPATIDE 5 MG/.5ML
5 INJECTION, SOLUTION SUBCUTANEOUS
Qty: 1 | Refills: 1 | Status: ACTIVE | COMMUNITY
Start: 2024-10-24 | End: 1900-01-01

## 2024-11-05 ENCOUNTER — RX RENEWAL (OUTPATIENT)
Age: 59
End: 2024-11-05

## 2024-11-27 NOTE — ASU PATIENT PROFILE, ADULT - ACCEPTABLE
Pt requested to speak to charge RN. This RN spoke with patient at length. Pt wanting someone to listen to her and to advocate for her. Pt  states she had covid and then her psychiatrist placed her on a medication to help her sleep but it tore her stomach up. States she has been seen in the ER three times for her abdomen and her heart. She has always been given morphine, but they would not give it here. States she is to see GI MD on Dec. 9th, but wants to see them now since she is here and we have all her tests. Discussed with CHIQUIS SHINE regarding pt's request to have GI consulted. She will discuss with her attending and go from there. Pt updated on plan.    7

## 2024-12-03 ENCOUNTER — APPOINTMENT (OUTPATIENT)
Dept: INTERNAL MEDICINE | Facility: CLINIC | Age: 59
End: 2024-12-03

## 2025-01-08 RX ORDER — TRETINOIN 0.5 MG/G
0.05 CREAM TOPICAL
Qty: 1 | Refills: 2 | Status: ACTIVE | COMMUNITY
Start: 2025-01-08 | End: 1900-01-01

## 2025-01-14 ENCOUNTER — RX RENEWAL (OUTPATIENT)
Age: 60
End: 2025-01-14

## 2025-02-13 ENCOUNTER — RX RENEWAL (OUTPATIENT)
Age: 60
End: 2025-02-13

## 2025-02-19 ENCOUNTER — RX RENEWAL (OUTPATIENT)
Age: 60
End: 2025-02-19

## 2025-03-06 ENCOUNTER — RX RENEWAL (OUTPATIENT)
Age: 60
End: 2025-03-06

## 2025-03-17 ENCOUNTER — RX RENEWAL (OUTPATIENT)
Age: 60
End: 2025-03-17

## 2025-03-28 ENCOUNTER — RX RENEWAL (OUTPATIENT)
Age: 60
End: 2025-03-28

## 2025-04-05 ENCOUNTER — NON-APPOINTMENT (OUTPATIENT)
Age: 60
End: 2025-04-05

## 2025-04-07 ENCOUNTER — APPOINTMENT (OUTPATIENT)
Dept: DERMATOLOGY | Facility: CLINIC | Age: 60
End: 2025-04-07
Payer: COMMERCIAL

## 2025-04-07 DIAGNOSIS — Z79.899 OTHER LONG TERM (CURRENT) DRUG THERAPY: ICD-10-CM

## 2025-04-07 DIAGNOSIS — L20.9 ATOPIC DERMATITIS, UNSPECIFIED: ICD-10-CM

## 2025-04-07 DIAGNOSIS — L13.0 DERMATITIS HERPETIFORMIS: ICD-10-CM

## 2025-04-07 DIAGNOSIS — B35.3 TINEA PEDIS: ICD-10-CM

## 2025-04-07 PROCEDURE — G2211 COMPLEX E/M VISIT ADD ON: CPT | Mod: NC

## 2025-04-07 PROCEDURE — 99214 OFFICE O/P EST MOD 30 MIN: CPT

## 2025-04-07 RX ORDER — KETOCONAZOLE 20 MG/G
2 CREAM TOPICAL TWICE DAILY
Qty: 1 | Refills: 3 | Status: ACTIVE | COMMUNITY
Start: 2025-04-07 | End: 1900-01-01

## 2025-04-08 LAB
ALBUMIN SERPL ELPH-MCNC: 4.8 G/DL
ALP BLD-CCNC: 73 U/L
ALT SERPL-CCNC: 23 U/L
AST SERPL-CCNC: 21 U/L
BASOPHILS # BLD AUTO: 0.08 K/UL
BASOPHILS NFR BLD AUTO: 0.9 %
BILIRUB DIRECT SERPL-MCNC: 0.2 MG/DL
BILIRUB INDIRECT SERPL-MCNC: 0.7 MG/DL
BILIRUB SERPL-MCNC: 0.9 MG/DL
EOSINOPHIL # BLD AUTO: 0.25 K/UL
EOSINOPHIL NFR BLD AUTO: 2.8 %
HCT VFR BLD CALC: 37.8 %
HGB BLD-MCNC: 12.1 G/DL
IMM GRANULOCYTES NFR BLD AUTO: 0.4 %
LYMPHOCYTES # BLD AUTO: 2.12 K/UL
LYMPHOCYTES NFR BLD AUTO: 23.6 %
MAN DIFF?: NORMAL
MCHC RBC-ENTMCNC: 32 G/DL
MCHC RBC-ENTMCNC: 33.5 PG
MCV RBC AUTO: 104.7 FL
MONOCYTES # BLD AUTO: 0.62 K/UL
MONOCYTES NFR BLD AUTO: 6.9 %
NEUTROPHILS # BLD AUTO: 5.87 K/UL
NEUTROPHILS NFR BLD AUTO: 65.4 %
PLATELET # BLD AUTO: 390 K/UL
PROT SERPL-MCNC: 7.1 G/DL
RBC # BLD: 3.61 M/UL
RBC # BLD: 3.61 M/UL
RBC # FLD: 13.2 %
RETICS # AUTO: 4 %
RETICS AGGREG/RBC NFR: 144.4 K/UL
WBC # FLD AUTO: 8.98 K/UL

## 2025-04-11 ENCOUNTER — APPOINTMENT (OUTPATIENT)
Dept: DERMATOLOGY | Facility: CLINIC | Age: 60
End: 2025-04-11

## 2025-04-23 ENCOUNTER — APPOINTMENT (OUTPATIENT)
Dept: ENDOCRINOLOGY | Facility: CLINIC | Age: 60
End: 2025-04-23

## 2025-08-15 ENCOUNTER — RX RENEWAL (OUTPATIENT)
Age: 60
End: 2025-08-15

## 2025-08-30 ENCOUNTER — RX RENEWAL (OUTPATIENT)
Age: 60
End: 2025-08-30

## (undated) DEVICE — GLV 8 ESTEEM BLUE

## (undated) DEVICE — CUFF TOURNIQUET 18" DUAL PORT W PLC

## (undated) DEVICE — PACK UPPER EXTREMITY

## (undated) DEVICE — WRAP COMPRESSION CALF MED

## (undated) DEVICE — DRSG ESMARK 4"

## (undated) DEVICE — SUT MONOSOF 5-0 18" P-13

## (undated) DEVICE — DRSG SLING ARM LG

## (undated) DEVICE — POSITIONER STRAP ARMBOARD VELCRO TS-30 12

## (undated) DEVICE — GLV 7.5 PROTEXIS

## (undated) DEVICE — DRSG ACE BANDAGE 2"

## (undated) DEVICE — POSITIONER FOAM HEAD CRADLE

## (undated) DEVICE — DRAPE 3/4 SHEET 52X76"

## (undated) DEVICE — PREP SCRUB SKIN EXIDINE4% 30OZ

## (undated) DEVICE — BLANKET WARMER LOWER ADULT

## (undated) DEVICE — PREP KIT CHLOROHEXIDINE 4% 118CC

## (undated) DEVICE — DRAPE TOWEL BLUE 17" X 24"